# Patient Record
Sex: MALE | Race: WHITE | NOT HISPANIC OR LATINO | Employment: FULL TIME | URBAN - METROPOLITAN AREA
[De-identification: names, ages, dates, MRNs, and addresses within clinical notes are randomized per-mention and may not be internally consistent; named-entity substitution may affect disease eponyms.]

---

## 2017-01-10 ENCOUNTER — CLINICAL SUPPORT (OUTPATIENT)
Dept: REHABILITATION | Facility: HOSPITAL | Age: 37
End: 2017-01-10
Attending: ORTHOPAEDIC SURGERY
Payer: COMMERCIAL

## 2017-01-10 DIAGNOSIS — M54.2 CERVICALGIA: Primary | ICD-10-CM

## 2017-01-10 PROCEDURE — 97140 MANUAL THERAPY 1/> REGIONS: CPT | Performed by: PHYSICAL THERAPIST

## 2017-01-10 PROCEDURE — 97110 THERAPEUTIC EXERCISES: CPT | Performed by: PHYSICAL THERAPIST

## 2017-01-10 NOTE — PROGRESS NOTES
"..                                                      Physical Therapy Progress Note     Name: Felix DANIEL [facility]  Clinic Number: [id number]  Diagnosis:   Encounter Diagnosis   Name Primary?    Cervicalgia Yes     Physician: Eric Caro MD  Treatment Orders: PT Evaluation and Treatment  No past medical history on file.    Precautions: standard    Precautions: as per diagnosis/?instability C5-6 as per x-ray  Evaluation date: 11/22/2016  Visit # authorized: 8/30  Authorization period: 12-31-16  Plan of care expiration: 1-29-17  MD Follow up appt: none scheduled    Subjective     Pt reports: had an episode of some upper back pain with cold weather, but now warming up feeling better.  Pt states at rest no pain.  Very slight pain with chin tuck     Pain Scale: before treatment: 0/10 currently; after treatment: 0    Objective     Pt continues with some scapular instability L >R with increased winging and lateral rotation B    Therapeutic exercise: Felix received therapeutic exercises to develop BUE strengthening/flexibility and shoulder stabilization for 30 minutes    Verbally reviewed   Instructed pt in further scap strengthening with retraction with YTB x 10  Retraction with pull down x 10 with YTB  Single ER with blue TB x 20        UBE 3 min fwd/bwd no resistance(NP)    Chin tuck 1 x 10  Cervical rotation on ball w/ 3" hold 15 to each side(NP)  Shoulder shrug with ret x 10  Shoulder extension prone x 20 with 1#  Horizontal abd prone x 20   Pec stretch on half roll w/ nerve glides x 3 min   Protraction supine with blue theraband x 20    Lower trap Y's 1x10(NP)  Standing shoulder ER with BTB 2x10  Shoulder ER side lying w/ scapula retraction x 20   Side lying horizontal abduction w/ scapula retraction  (gator) lower trap 2x10   Horizontal abd B with YTB   Corner stretch x 3 planes x 5  Upper trap stretch 1x30"  Levator scap stretch 1x30"     Manual therapy: Felix received the following manual therapy techniques for " 10 minutes: STM neck and upper trap and suboccipitals with gentle suboccipital release.  Noted mod muscle tightness and tenderness R suboccipitals Contract relax Upper traps with SB B      Written Home Exercises Provided: indented ex as noted above  Pt demo good understanding of the education provided during the initial evaluation.   Felix demonstrated good return demonstration of activities.     Pt. education:  · Posture reeducation, body mechanics, HEP,activity modification/avoidance  · No spiritual or educational barriers to learning provided  · Pt has no cultural, educational or language barriers to learning provided.    Assessment   Pt with improved symptoms overall and will benefit from further scap strengthening  Pt will continue to benefit from skilled outpatient physical therapy to address the remaining functional deficits, provide pt/family education, and to maximize pt's level of independence in the home and community environment. .     Anticipated barriers to physical therapy: None    Medical necessity is demonstrated by the following IMPAIRMENTS/PROBLEM LIST:  Decreased range of motion  Decreased strength  Poor posture  Decreased scapular stabilization  Increased pain with prolonged use of UE's in sustained elevated position  Increased pain with carrying 2 year old son  Functional impairment rating of: 61     GOALS:   Short Term Goals: 2 weeks  PARTIALLY ACHIEVED STG'S  Increase range of motion 25%  Increase strength 1/2 muscle grade  Increase postural awareness to normal  Be able to perform HEP with minimal cueing required  Improve functional impairment to 65     Long Term Goals: 4 weeks PARTIALLY ACHIEVED LTG'S  Increase range of motion to 75%to 100% of full  Increase strength 1 muscle grade  Improve scapular stabilization to normal  Restore ability to lift and carry son without increased pain  Restore ability to use UE's in sustained position without increased pain  Pt to be independent with HEP to  improve ROM and independence with ADL's  Improve functional impairment to 71       · Pt's spiritual, cultural and educational needs considered and pt agreeable to plan of care and goals as stated below:        Plan   Continue with established plan of care towards PT goals.

## 2017-01-12 ENCOUNTER — CLINICAL SUPPORT (OUTPATIENT)
Dept: REHABILITATION | Facility: HOSPITAL | Age: 37
End: 2017-01-12
Attending: ORTHOPAEDIC SURGERY
Payer: COMMERCIAL

## 2017-01-12 DIAGNOSIS — M54.2 CERVICALGIA: Primary | ICD-10-CM

## 2017-01-12 PROCEDURE — 97110 THERAPEUTIC EXERCISES: CPT | Performed by: PHYSICAL THERAPIST

## 2017-01-12 PROCEDURE — 97140 MANUAL THERAPY 1/> REGIONS: CPT | Performed by: PHYSICAL THERAPIST

## 2017-01-12 NOTE — PROGRESS NOTES
"..                                                      Physical Therapy Progress Note     Name: Felix DANIEL [facility]  Clinic Number: [id number]  Diagnosis:   Encounter Diagnosis   Name Primary?    Cervicalgia Yes     Physician: Eric Caro MD  Treatment Orders: PT Evaluation and Treatment  No past medical history on file.    Precautions: standard    Precautions: as per diagnosis/?instability C5-6 as per x-ray  Evaluation date: 11/22/2016  Visit # authorized: 8/30  Authorization period: 12-31-16  Plan of care expiration: 1-29-17  MD Follow up appt: none scheduled    Subjective     Pt reports: was a little sore in shoulder blade, so rested yesterday from exercises.  This AM little neck pain upon awakening, but better now      Pain Scale: before treatment: 0/10 currently; after treatment: 0    Objective     Pt continues with some scapular instability L >R with increased winging and lateral rotation B    Therapeutic exercise: Felix received therapeutic exercises to develop BUE strengthening/flexibility and shoulder stabilization for 15 direct minutes  Assisted by tech    Chin tuck 2 x 10  Retraction with YTB x 10  Retraction with pull down x 10 with YTB  Single ER with blue TB x 20  Corner stretch x 3 planes x 5  Upper trap stretch 1x30"  Levator scap stretch 1x30"   Shoulder shrug with ret x 10    Shoulder extension prone x 20 with 1#  Horizontal abd prone x 20   Pec stretch on half roll w/ nerve glides x 3 min  Protraction supine with blue theraband x 20    Lower trap Y's 1x10 prone  Shoulder ER side lying w/ scapula retraction x 20 with 1#  Side lying horizontal abduction w/ scapula retraction  (gator) lower trap 2x10 with 1#    Manual therapy: Felix received the following manual therapy techniques for 10 minutes: STM neck and upper trap and suboccipitals with gentle suboccipital release.  Noted min-mod muscle tightness and tenderness R suboccipitals Contract relax Upper traps with SB B      Written Home Exercises " Provided: none today  Pt demo good understanding of the education provided during the initial evaluation.   Felix demonstrated good return demonstration of activities.     Pt. education:  · Posture reeducation, body mechanics, HEP,activity modification/avoidance  · No spiritual or educational barriers to learning provided  · Pt has no cultural, educational or language barriers to learning provided.    Assessment   Pt with muscle soreness after last treatment since now better isolating muscles with strengthening  Decreased tightness overall in neck musculature.      Pt will continue to benefit from skilled outpatient physical therapy to address the remaining functional deficits, provide pt/family education, and to maximize pt's level of independence in the home and community environment. .     Anticipated barriers to physical therapy: None    Medical necessity is demonstrated by the following IMPAIRMENTS/PROBLEM LIST:  Decreased range of motion  Decreased strength  Poor posture  Decreased scapular stabilization  Increased pain with prolonged use of UE's in sustained elevated position  Increased pain with carrying 2 year old son  Functional impairment rating of: 61     GOALS:   Short Term Goals: 2 weeks  PARTIALLY ACHIEVED STG'S  Increase range of motion 25%  Increase strength 1/2 muscle grade  Increase postural awareness to normal  Be able to perform HEP with minimal cueing required  Improve functional impairment to 65     Long Term Goals: 4 weeks PARTIALLY ACHIEVED LTG'S  Increase range of motion to 75%to 100% of full  Increase strength 1 muscle grade  Improve scapular stabilization to normal  Restore ability to lift and carry son without increased pain  Restore ability to use UE's in sustained position without increased pain  Pt to be independent with HEP to improve ROM and independence with ADL's  Improve functional impairment to 71       · Pt's spiritual, cultural and educational needs considered and pt agreeable  to plan of care and goals as stated below:        Plan   Continue with established plan of care towards PT goals.

## 2017-01-17 ENCOUNTER — CLINICAL SUPPORT (OUTPATIENT)
Dept: REHABILITATION | Facility: HOSPITAL | Age: 37
End: 2017-01-17
Attending: ORTHOPAEDIC SURGERY
Payer: COMMERCIAL

## 2017-01-17 DIAGNOSIS — M54.2 CERVICALGIA: Primary | ICD-10-CM

## 2017-01-17 PROCEDURE — 97140 MANUAL THERAPY 1/> REGIONS: CPT | Performed by: PHYSICAL THERAPIST

## 2017-01-17 PROCEDURE — 97110 THERAPEUTIC EXERCISES: CPT | Performed by: PHYSICAL THERAPIST

## 2017-01-19 ENCOUNTER — CLINICAL SUPPORT (OUTPATIENT)
Dept: REHABILITATION | Facility: HOSPITAL | Age: 37
End: 2017-01-19
Attending: ORTHOPAEDIC SURGERY
Payer: COMMERCIAL

## 2017-01-19 DIAGNOSIS — M54.2 CERVICALGIA: Primary | ICD-10-CM

## 2017-01-19 PROCEDURE — 97140 MANUAL THERAPY 1/> REGIONS: CPT | Performed by: PHYSICAL THERAPIST

## 2017-01-19 PROCEDURE — 97110 THERAPEUTIC EXERCISES: CPT | Performed by: PHYSICAL THERAPIST

## 2017-01-19 NOTE — PROGRESS NOTES
"..                                                      Physical Therapy Progress Note     Name: Felix Garciamonika  Clinic Number: [id number]  Diagnosis:   Encounter Diagnosis   Name Primary?   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 and able to hold UE in sustained position longer  Pain Scale: before treatment: 0 currently; after treatment: no c/o    Objective     Improving tone in rhomboids    FOTO 68 on 1-17-17 previous 63    Therapeutic exercise: Felix received therapeutic exercises to develop BUE strengthening/flexibility and shoulder stabilization for 30 direct minutes    Upper trap stretch 1x30"  Levator scap stretch 1x30"   Chin tuck 2 x 10  Retraction with YTB x 10  Retraction with pull down x 10 with YTB  Single ER with blue TB x 20  Corner stretch x 3 planes x 5   Wall push ups plus x 20    Shoulder shrug with ret x 10    Shoulder extension prone x 10 with 2# 10 with 1#  Horizontal abd prone x 5 with 1# 2/10 with  No weight  Lower trap Y's 1x10 prone    Pec stretch on half roll w/ nerve glides x 3 min  Protraction supine with blue theraband x 20 and with 5#      Shoulder ER side lying w/ scapula retraction x 20 with 1#  Side lying horizontal abduction w/ scapula retraction  (gator) lower trap 2x10 with 1#      Instructed pt in use of functional closed chain ex as with push up and role of isolating muscles as we have been doing    Manual therapy: Felix received the following manual therapy techniques for 10 minutes: STM neck and upper trap and suboccipitals with gentle suboccipital release.  Noted min " muscle tightness and tenderness R suboccipitals min-mod upper trap, contract relax SB neck      Written Home Exercises Provided: none today  Pt demo good understanding of the education provided during the initial evaluation.   Fleix demonstrated good return demonstration of activities.     Pt. education:  · Posture reeducation, body mechanics, HEP,activity modification/avoidance  · No spiritual or educational barriers to learning provided  · Pt has no cultural, educational or language barriers to learning provided.    Assessment   Pt with improving endurance with sustained use of UE, can carry son better and improving tone, though still note fatigue with ex with YTB dennis in rhomboids    Pt will continue to benefit from skilled outpatient physical therapy to address the remaining functional deficits, provide pt/family education, and to maximize pt's level of independence in the home and community environment. .     Anticipated barriers to physical therapy: None    Medical necessity is demonstrated by the following IMPAIRMENTS/PROBLEM LIST:  Decreased range of motion  Decreased strength  Poor posture  Decreased scapular stabilization  Increased pain with prolonged use of UE's in sustained elevated position  Increased pain with carrying 2 year old son  Functional impairment rating of: 61     GOALS:   Short Term Goals: 2 weeks  PARTIALLY ACHIEVED STG'S  Increase range of motion 25%  Increase strength 1/2 muscle grade  Increase postural awareness to normal  Be able to perform HEP with minimal cueing required  Improve functional impairment to 65     Long Term Goals: 4 weeks PARTIALLY ACHIEVED LTG'S  Increase range of motion to 75%to 100% of full  Increase strength 1 muscle grade  Improve scapular stabilization to normal  Restore ability to lift and carry son without increased pain  Restore ability to use UE's in sustained position without increased pain  Pt to be independent with HEP to improve ROM and independence with  ADL's  Improve functional impairment to 71       · Pt's spiritual, cultural and educational needs considered and pt agreeable to plan of care and goals as stated below:        Plan   Continue with established plan of care towards PT goals.  See pt 1 more week and then should be ready for discharge

## 2017-01-24 ENCOUNTER — CLINICAL SUPPORT (OUTPATIENT)
Dept: REHABILITATION | Facility: HOSPITAL | Age: 37
End: 2017-01-24
Attending: ORTHOPAEDIC SURGERY
Payer: COMMERCIAL

## 2017-01-24 DIAGNOSIS — M54.2 CERVICALGIA: Primary | ICD-10-CM

## 2017-01-24 PROCEDURE — 97140 MANUAL THERAPY 1/> REGIONS: CPT | Performed by: PHYSICAL THERAPIST

## 2017-01-24 PROCEDURE — 97110 THERAPEUTIC EXERCISES: CPT | Performed by: PHYSICAL THERAPIST

## 2017-01-24 RX ORDER — MELOXICAM 15 MG/1
TABLET ORAL
Qty: 30 TABLET | Refills: 1 | Status: SHIPPED | OUTPATIENT
Start: 2017-01-24 | End: 2023-01-14 | Stop reason: ALTCHOICE

## 2017-01-24 NOTE — PROGRESS NOTES
"..                                                      Physical Therapy Daily Note     Name: Felix Garciamonika  Clinic Number: [id number]  Diagnosis:   Encounter Diagnosis   Name Primary?    Cervicalgia Yes     Physician: Eric Caro MD  Treatment Orders: PT Evaluation and Treatment    Precautions: standard    Precautions: as per diagnosis/?instability C5-6 as per x-ray  Evaluation date: 11/22/2016  Visit # authorized: 12/30  Authorization period: 12-31-16  Plan of care expiration: 1-29-17  MD Follow up appt: none scheduled    Subjective     Pt reports: feeling good overall one day had a little pain, but resolved after he overdid   Pain Scale: before treatment: 0 currently; after treatment: no c/o    Objective     Improving tone in rhomboids    FOTO 68 on 1-17-17 previous 63    Therapeutic exercise: Felix received therapeutic exercises to develop BUE strengthening/flexibility and shoulder stabilization for 30 direct minutes  Assisted by PT tech  Upper trap stretch 1x30"  Levator scap stretch 1x30"   Chin tuck 2 x 10  Retraction with YTB x 10  Retraction with pull down x 10 with YTB  Single ER with blue TB x 20  Corner stretch x 3 planes x 5  Wall push ups plus x 20    Shoulder shrug with ret x 10    Shoulder extension prone x 10 with 2# 10 with 1#  Horizontal abd prone x 5 with 1# 2/10 with  No weight  Lower trap Y's 1x10 prone    Pec stretch on half roll w/ nerve glides x 3 min  Protraction supine with blue theraband x 20 and with 5#      Shoulder ER side lying w/ scapula retraction x 20 with 1#  Side lying horizontal abduction w/ scapula retraction  (gator) lower trap 2x10 with 1#      Manual therapy: Felix received the following manual therapy techniques for 10 minutes: STM neck and upper trap and suboccipitals with gentle suboccipital release.  Noted min muscle tightness and tenderness R suboccipitals min-mod upper trap,       Written Home Exercises Provided: none today  Pt demo good understanding of the " education provided during the initial evaluation.   Felix demonstrated good return demonstration of activities.     Pt. education:  · Posture reeducation, body mechanics, HEP,activity modification/avoidance  · No spiritual or educational barriers to learning provided  · Pt has no cultural, educational or language barriers to learning provided.    Assessment   Pt moving well through HEP and should be ready for discharge after next visit    Pt will continue to benefit from skilled outpatient physical therapy to address the remaining functional deficits, provide pt/family education, and to maximize pt's level of independence in the home and community environment. .     Anticipated barriers to physical therapy: None    Medical necessity is demonstrated by the following IMPAIRMENTS/PROBLEM LIST:  Decreased range of motion  Decreased strength  Poor posture  Decreased scapular stabilization  Increased pain with prolonged use of UE's in sustained elevated position  Increased pain with carrying 2 year old son  Functional impairment rating of: 61     GOALS:   Short Term Goals: 2 weeks  PARTIALLY ACHIEVED STG'S  Increase range of motion 25%  Increase strength 1/2 muscle grade  Increase postural awareness to normal  Be able to perform HEP with minimal cueing required  Improve functional impairment to 65     Long Term Goals: 4 weeks PARTIALLY ACHIEVED LTG'S  Increase range of motion to 75%to 100% of full  Increase strength 1 muscle grade  Improve scapular stabilization to normal  Restore ability to lift and carry son without increased pain  Restore ability to use UE's in sustained position without increased pain  Pt to be independent with HEP to improve ROM and independence with ADL's  Improve functional impairment to 71       · Pt's spiritual, cultural and educational needs considered and pt agreeable to plan of care and goals as stated below:        Plan   Continue with established plan of care towards PT goals.  See pt 1 more  visit and then should be ready for discharge

## 2017-01-26 ENCOUNTER — CLINICAL SUPPORT (OUTPATIENT)
Dept: REHABILITATION | Facility: HOSPITAL | Age: 37
End: 2017-01-26
Attending: ORTHOPAEDIC SURGERY
Payer: COMMERCIAL

## 2017-01-26 DIAGNOSIS — M54.2 CERVICALGIA: Primary | ICD-10-CM

## 2017-01-26 PROCEDURE — 97140 MANUAL THERAPY 1/> REGIONS: CPT | Performed by: PHYSICAL THERAPIST

## 2017-01-26 PROCEDURE — 97110 THERAPEUTIC EXERCISES: CPT | Performed by: PHYSICAL THERAPIST

## 2017-01-26 NOTE — PROGRESS NOTES
..                                                      Physical Therapy Discharge Note     Name: Felix Stern  Clinic Number: [id number]  Diagnosis:   Encounter Diagnosis   Name Primary?    Cervicalgia Yes     Physician: Eric Caro MD  Treatment Orders: PT Evaluation and Treatment    Precautions: standard    Precautions: as per diagnosis/?instability C5-6 as per x-ray  Evaluation date: 11/22/2016  Visit # authorized: 13/30  Authorization period: 12-31-16  Plan of care expiration: 1-29-17  MD Follow up appt: none scheduled    Subjective     Pt reports: feeling good with no pain since last visit at worst 1-2 3 maybe worst  Pain Scale: before treatment: 0 currently; after treatment: no c/o    Objective     Cervical ROM: (measured in degrees sitting) 1-26-17  - flexion -  55                    - extension -  60                        - right side bending -  50        - left side bending -  50      - right rotation - 80  - left rotation - 80      Cervical ROM: (measured in degrees sitting) INITIAL EVAL  - flexion - 55 pain in neck at end range   - extension - 60   - right side bending - 45 little pulling on R   - left side bending - 45   - right rotation - 80  - left rotation - 80 pain on L     Shoulder ROM: (measured in degrees standing)  Same as IE   Shoulder  RUE LUE   Active Flexion 180 180   Active Abduction 180 180      Scapular instability noted with elevation and abduction in standing with increased winging, lateral rotation and protraction of the L scapula.      Shoulder ROM: (measured in degrees supine) PROM WNL    Muscle Strength 1-26-17  MMT R L   Elbow flexion 5/5 5/5   Elbow extension 5/5 5/5   Shoulder flexion 5/5 5/5   Shoulder abduction 5/5 5/5   Shoulder external rotation 5/5 5/5   Shoulder internal rotation 5/5 5/5        Upper trap 5/5 5/5   Middle trap 4+/5 4+/5   Lower trap 4/5 4/5   Rhomboids 4/5 4/5             Muscle Strength INITIAL EVAL  MMT R L   Elbow flexion 5/5 5/5   Elbow  "extension 5/5 5/5   Shoulder flexion 5/5 5/5   Shoulder abduction 5/5 5/5   Shoulder external rotation 5-/5 4+/5   Shoulder internal rotation 5/5 5/5           Upper trap 5/5 5/5   Middle trap 3/5 3/5   Lower trap 3-/5 3-/5   Rhomboids 3-/5 3-/5         Endurance is good at IE poor       Outcome measures:   FOTO neck disability index score: 74 at IE 61  PT reviewed FOTO scores for Felix Stern    FOTO scores were entered into EPIC - see media section.         Therapeutic exercise: Felix received therapeutic exercises to develop BUE strengthening/flexibility and shoulder stabilization for 30 direct minutes  Assisted by PT tech  Upper trap stretch 1x30"  Levator scap stretch 1x30"   Chin tuck 2 x 10  Retraction with YTB x 10  Retraction with pull down x 10 with YTB  Single ER with blue TB x 20  Corner stretch x 3 planes x 5  Wall push ups plus x 20    Shoulder shrug with ret x 10    Shoulder extension prone x 10 with 2# 10 with 1#  Horizontal abd prone x 10 with 2# 2/10 with  No weight  Lower trap Y's 2x10 prone    Pec stretch on half roll w/ nerve glides x 3 min  Protraction supine with blue theraband x 20 and with 5#      Shoulder ER side lying w/ scapula retraction x 20 with 1#  Side lying horizontal abduction w/ scapula retraction  (gator) lower trap 2x10 with 1#      Manual therapy: Felix received the following manual therapy techniques for 10 minutes: STM neck and upper trap and suboccipitals with gentle suboccipital release.  Noted min muscle tightness and tenderness R suboccipitals min-mod upper trap,       Written Home Exercises Provided: none today  Pt demo good understanding of the education provided during the initial evaluation.   Felix demonstrated good return demonstration of activities.     Pt. education:  · Posture reeducation, body mechanics, HEP,activity modification/avoidance  · No spiritual or educational barriers to learning provided  · Pt has no cultural, educational or language barriers to " learning provided.    Assessment   Patient demonstrates improvement in range of motion, strength, stabilization and function.  Patient appears independent in the prescribed HEP and ready for discharge after fully achieving the established goals.      Medical necessity is demonstrated by the following IMPAIRMENTS/PROBLEM LIST:  Decreased range of motion  Decreased strength  Poor posture  Decreased scapular stabilization  Increased pain with prolonged use of UE's in sustained elevated position  Increased pain with carrying 2 year old son  Functional impairment rating of: 61     GOALS:   Short Term Goals: 2 weeks   ACHIEVED STG'S  Increase range of motion 25%  Increase strength 1/2 muscle grade  Increase postural awareness to normal  Be able to perform HEP with minimal cueing required  Improve functional impairment to 65     Long Term Goals: 4 weeks  ACHIEVED LTG'S  Increase range of motion to 75%to 100% of full  Increase strength 1 muscle grade  Improve scapular stabilization to normal  Restore ability to lift and carry son without increased pain  Restore ability to use UE's in sustained position without increased pain  Pt to be independent with HEP to improve ROM and independence with ADL's  Improve functional impairment to 71        Plan   Patient is discharged from physical therapy after fully achieving the established goals.  Thank you for allowing us to assist in the care of your patient.

## 2017-02-10 ENCOUNTER — HOSPITAL ENCOUNTER (OUTPATIENT)
Dept: RADIOLOGY | Facility: HOSPITAL | Age: 37
Discharge: HOME | End: 2017-02-10
Attending: ORTHOPAEDIC SURGERY
Payer: COMMERCIAL

## 2017-02-10 ENCOUNTER — OFFICE VISIT (OUTPATIENT)
Dept: ORTHOPEDICS | Facility: CLINIC | Age: 37
End: 2017-02-10
Payer: COMMERCIAL

## 2017-02-10 VITALS
BODY MASS INDEX: 25.44 KG/M2 | WEIGHT: 158.31 LBS | SYSTOLIC BLOOD PRESSURE: 128 MMHG | HEIGHT: 66 IN | HEART RATE: 69 BPM | DIASTOLIC BLOOD PRESSURE: 89 MMHG

## 2017-02-10 DIAGNOSIS — R52 PAIN: ICD-10-CM

## 2017-02-10 DIAGNOSIS — M20.21 HALLUX RIGIDUS OF RIGHT FOOT: ICD-10-CM

## 2017-02-10 DIAGNOSIS — R52 PAIN: Primary | ICD-10-CM

## 2017-02-10 PROCEDURE — 99214 OFFICE O/P EST MOD 30 MIN: CPT | Mod: S$GLB,,, | Performed by: ORTHOPAEDIC SURGERY

## 2017-02-10 PROCEDURE — 73630 X-RAY EXAM OF FOOT: CPT | Mod: 26,RT,, | Performed by: RADIOLOGY

## 2017-02-10 PROCEDURE — 73630 X-RAY EXAM OF FOOT: CPT | Mod: TC,RT

## 2017-02-10 PROCEDURE — 99999 PR PBB SHADOW E&M-EST. PATIENT-LVL III: CPT | Mod: PBBFAC,,, | Performed by: ORTHOPAEDIC SURGERY

## 2017-02-10 RX ORDER — ALBUTEROL SULFATE 90 UG/1
AEROSOL, METERED RESPIRATORY (INHALATION)
COMMUNITY
Start: 2017-02-03

## 2017-02-10 RX ORDER — ACYCLOVIR 400 MG/1
400 TABLET ORAL DAILY
COMMUNITY
Start: 2016-12-24 | End: 2024-01-04 | Stop reason: SDUPTHER

## 2017-02-10 NOTE — PROGRESS NOTES
This is a new patient.    CHIEF COMPLAINT:  Bunion, right foot.    SUMMARY:  This is a 36-year-old gentleman who works as an  who over   the last year has noticed some prominence and discomfort on dorsum of his right   foot and big toe.  He comes in today for evaluation.  He does not report any   injury.  As mentioned, he is on his feet a lot as an .  He states   the pain that he gets essentially occurs when he has been on his feet for long   periods.  He does not really report any morning stiffness.  He states he has   noted bunion may be getting larger over time.    PAST MEDICAL HISTORY:  Otherwise, noncontributory.    SOCIAL HISTORY:  Tobacco use, not reported.  Alcohol use, occasionally.    MEDICATIONS:  Include Ventolin inhaler.    ALLERGIES:  None known.    REVIEW OF SYSTEMS:  Negative for any chest pain, shortness of breath, fevers or   weight loss.    PHYSICAL EXAMINATION:  GENERAL:  This is a well-developed, well-nourished, 5 feet 6 inches, 158-pound   male with a pulse of 70 who walks in with a normal gait.  EXTREMITIES:  On standing inspection, he has plantigrade alignment of his feet.    He has some mild noticeable prominence in the dorsum of his right big toe at   the MTP joint.  On sitting exam, he has a palpable bony prominence, which is   mildly tender.  He has good functional motion of his big toe, but he is a bit   limited in dorsiflexion on the right compared to the left and this does cause   some discomfort.  He does not have any pain within the mid range of motion.  He   has full motion of his ankle and subtalar joint.  He has normal strength and   sensation and good distal pulses.    IMAGING:  I ordered and reviewed a right foot x-ray today.  He has some mild   degenerative changes of his right big toe MTP joint on AP view.  On the lateral   view, he has some dorsal osteophyte formation of the first metatarsal head and   some slight osteophyte formation in the dorsum  of the proximal phalanx.    IMPRESSION:  Right hallux rigidus.    RECOMMENDATIONS:  Treatment options were discussed with Mr. Stern.  At this   point, his symptoms are relatively manageable.  We did have some discussion   about possibly doing a cheilectomy to remove the osteophyte formation.  If he   decides he would like to do that, he will call and let us know.  I am going to   have him return to see me as needed.      SCOTT/AMBROSIO  dd: 02/10/2017 08:58:08 (CST)  td: 02/10/2017 15:05:37 (CST)  Doc ID   #[id number]  Job ID #[id number]    CC:

## 2017-09-12 ENCOUNTER — OFFICE VISIT (OUTPATIENT)
Dept: GASTROENTEROLOGY | Facility: CLINIC | Age: 37
End: 2017-09-12
Payer: COMMERCIAL

## 2017-09-12 VITALS
HEIGHT: 66 IN | SYSTOLIC BLOOD PRESSURE: 131 MMHG | DIASTOLIC BLOOD PRESSURE: 98 MMHG | WEIGHT: 160.94 LBS | HEART RATE: 86 BPM | BODY MASS INDEX: 25.86 KG/M2

## 2017-09-12 DIAGNOSIS — K21.9 GASTROESOPHAGEAL REFLUX DISEASE WITHOUT ESOPHAGITIS: Primary | ICD-10-CM

## 2017-09-12 PROCEDURE — 99204 OFFICE O/P NEW MOD 45 MIN: CPT | Mod: S$GLB,,, | Performed by: PHYSICIAN ASSISTANT

## 2017-09-12 PROCEDURE — 3008F BODY MASS INDEX DOCD: CPT | Mod: S$GLB,,, | Performed by: PHYSICIAN ASSISTANT

## 2017-09-12 PROCEDURE — 99999 PR PBB SHADOW E&M-EST. PATIENT-LVL IV: CPT | Mod: PBBFAC,,, | Performed by: PHYSICIAN ASSISTANT

## 2017-09-12 RX ORDER — ESOMEPRAZOLE MAGNESIUM 10 MG/1
10 GRANULE, FOR SUSPENSION, EXTENDED RELEASE ORAL
COMMUNITY
End: 2021-02-01

## 2017-09-12 RX ORDER — FAMOTIDINE 10 MG/1
10 TABLET ORAL 2 TIMES DAILY
COMMUNITY
End: 2024-01-04

## 2017-09-12 RX ORDER — MULTIVITAMIN
1 TABLET ORAL DAILY
COMMUNITY
End: 2024-01-04

## 2017-09-12 NOTE — PROGRESS NOTES
[facility] Consultation Note    Reason for Consult:  Gastroesophageal reflux    PCP: Primary Doctor No     Referring MD:   Aaareferral Self  No address on file    HPI:  This is a 36 y.o. male here for evaluation of gastroesophageal reflux.  Duration - 4 yrs   Took Nexium for a few years with great relief, then stopped after reading it could cause dementia; he says he already suffers from memory loss. Then started pepcid and zantac 150mg in AM then in PM if needed,  without relief.     Restarted nexium OTC 3 days ago after having a severe episode of GERD and regurgitation up to his nose, after lying down 3 nights ago after eating mexican food.     Typical GERD Symptoms  Pyrosis - yes  Regurgitation- yes  Nocturnal symptoms - early morning  Nausea- no  Vomiting-no   Dysphagia/Odynophagia - no  Epigastric abdominal pain- no     Diet / Lifestyle:  Last Meal at  7pm   lying down / reclining at  10-11pm  Caffeine intake - coffee 16-20oz, coke 1-2 per day  He admits he eat many foods that could cause GERD including tacos every Tuesday, red sauce, mexican food, fast food.    Prior EGD date/findings: no prior    ROS:  Constitutional: No fevers, chills, No weight loss  ENT: No allergies  CV: No chest pain  Pulm: No cough, No shortness of breath  Ophtho: No vision changes  GI: see HPI  Derm: No rash  Heme: No lymphadenopathy, No bruising  MSK: No arthritis  : No dysuria, No hematuria  Endo: No hot or cold intolerance  Neuro: No syncope, No seizure  Psych: No anxiety, No depression    Medical History:  has a past medical history of GERD (gastroesophageal reflux disease).    Surgical History:  has a past surgical history that includes Hand surgery.    Family History: family history is not on file..     Social History:  reports that he has never smoked. He has never used smokeless tobacco. He reports that he drinks alcohol. He reports that he does not use drugs.    Review of patient's allergies  "indicates:  No Known Allergies    Current Outpatient Prescriptions   Medication Sig    acyclovir (ZOVIRAX) 400 MG tablet     esomeprazole magnesium (NEXIUM) 10 mg GrPS Take 10 mg by mouth before breakfast.    famotidine (PEPCID) 10 MG tablet Take 10 mg by mouth 2 (two) times daily.    multivitamin (THERAGRAN) per tablet Take 1 tablet by mouth once daily.    ranitidine (ZANTAC) 15 mg/mL syrup Take by mouth every 12 (twelve) hours.    VENTOLIN HFA 90 mcg/actuation inhaler     meloxicam (MOBIC) 15 MG tablet TAKE 1 TABLET BY MOUTH DAILY    methocarbamol (ROBAXIN) 750 MG Tab TAKE 1 TABLET BY MOUTH NIGHTLY AS NEEDED FOR SPASMS     No current facility-administered medications for this visit.          Objective Findings:    Vital Signs:  BP (!) 131/98   Pulse 86   Ht 5' 6" (1.676 m)   Wt 73 kg (160 lb 15 oz)   BMI 25.98 kg/m²   Body mass index is 25.98 kg/m².    Physical Exam:  General Appearance: Well appearing in no acute distress  Head:   Normocephalic, without obvious abnormality  Eyes:    No scleral icterus  ENT: Neck supple, Lips, mucosa, and tongue normal  Lungs: CTA bilaterally in anterior and posterior fields, no wheezes, no crackles.  Heart:  Regular rate and rhythm, S1, S2 normal, no murmurs heard  Abdomen: Soft, non tender, non distended with positive bowel sounds in all four quadrants.  Extremities: no edema  Skin: No rash  Neurologic: AAO x 3      Labs:  No results found for: WBC, HGB, HCT, PLT, CHOL, TRIG, HDL, LDLDIRECT, ALT, AST, NA, K, CL, CREATININE, BUN, CO2, TSH, PSA, INR, GLUF, HGBA1C, MICROALBUR      Endoscopy:    none  Assessment:  1. Gastroesophageal reflux disease without esophagitis       37yo M with GERD x 4 years. Good relief with Nexium OTC, poor relief with HA2 blockers. High intake of spicy, fried foods. I suspect his symptoms would improve if he would avoid acidic foods    Recommendations:  1. Schedule EGD to rule out Richardson's esophagus, and a hiatal hernia    2. Trial of zantac " 300mg BID. Consider 3-6 course of PPI, then weening after EGD     3. GERD diet    Return in about 3 months (around 12/12/2017).      Order summary:  Orders Placed This Encounter    Case request GI: ESOPHAGOGASTRODUODENOSCOPY (EGD)         Thank you so much for allowing me to participate in the care of Felix Earl PA-C

## 2017-09-13 PROBLEM — K21.9 GASTROESOPHAGEAL REFLUX DISEASE WITHOUT ESOPHAGITIS: Status: ACTIVE | Noted: 2017-09-13

## 2018-07-10 ENCOUNTER — TELEPHONE (OUTPATIENT)
Dept: ENDOSCOPY | Facility: HOSPITAL | Age: 38
End: 2018-07-10

## 2020-09-02 ENCOUNTER — TELEPHONE (OUTPATIENT)
Dept: FAMILY MEDICINE | Facility: CLINIC | Age: 40
End: 2020-09-02

## 2020-09-02 ENCOUNTER — NURSE TRIAGE (OUTPATIENT)
Dept: ADMINISTRATIVE | Facility: CLINIC | Age: 40
End: 2020-09-02

## 2020-09-02 ENCOUNTER — OFFICE VISIT (OUTPATIENT)
Dept: FAMILY MEDICINE | Facility: CLINIC | Age: 40
End: 2020-09-02
Payer: COMMERCIAL

## 2020-09-02 DIAGNOSIS — R68.83 CHILLS: ICD-10-CM

## 2020-09-02 DIAGNOSIS — J45.40 MODERATE PERSISTENT ASTHMA WITHOUT COMPLICATION: ICD-10-CM

## 2020-09-02 DIAGNOSIS — R05.9 COUGH: Primary | ICD-10-CM

## 2020-09-02 PROCEDURE — 99202 OFFICE O/P NEW SF 15 MIN: CPT | Mod: 95,,, | Performed by: STUDENT IN AN ORGANIZED HEALTH CARE EDUCATION/TRAINING PROGRAM

## 2020-09-02 PROCEDURE — 99202 PR OFFICE/OUTPT VISIT, NEW, LEVL II, 15-29 MIN: ICD-10-PCS | Mod: 95,,, | Performed by: STUDENT IN AN ORGANIZED HEALTH CARE EDUCATION/TRAINING PROGRAM

## 2020-09-02 NOTE — TELEPHONE ENCOUNTER
----- Message from Jenna Alfonso MD sent at 9/2/2020  3:50 PM CDT -----  Regarding: Need a little bit of help  Hello, whoever picks up this task.  Thanks for helping.    1. I forgot to ask the patient his dose on his Symbicort for me to refill.  It is not under his medication reconciliation button.  Can you call and ask him what dose and refill it for me.  I will be happy to sign off.  He can have 6 months worth (either in 1 fill and 5 refills or 3 month supply with 1 refill).    2.  Can we make sure he gets set up for his covid-19 test and his surveillance program.  Also, is there a way to put him on a call list or something to make sure we check on him every couple of days for the first week to ensure he is not deteriorating.   3.  He has the portal so I am assuming he can see his after visit summary.    I think that is all that I have/need.  Thanks for any help you can lend and I hope you have a blessed day.   Dr. BRADEN

## 2020-09-02 NOTE — TELEPHONE ENCOUNTER
Pt already submitted consent for Covid Surveillance call so no welcome call needed.     Called patient to review COVID-19 Surveillance Program enrollment process.    Smartphone: yes    [facility]: yes    Program consent: yes    Pulse oximeter status: yes    Verified emergency contacts: yes    Program Overview: Reviewed , no response process, and importance of correct emergency contacts in event that well-being check is warranted. Encouraged patient to call [phone] 24/7 to speak with an OnCall nurse, if needed.    Patient had no further questions.      Reason for Disposition   Caller has cancelled the call before the first contact    Protocols used: NO CONTACT OR DUPLICATE CONTACT CALL-A-AH

## 2020-09-02 NOTE — PATIENT INSTRUCTIONS
Please report any worsening fever, cough, and especially shortness of breath to your provider.  Please do not hesitate to go to an emergency department if you experience any worsening shortness of breath or significant difficulty breathing.    Use Tylenol or NSAIDS (aleve, Ibuprofen) for fever.    Stay Hydrated.  Monitor breathing and cough.  Stay home.  Do not go out unless for an emergency reason.  Have friends, family bring food if needed or utilize food delivery from restaurants, delivery service, [employer] if possible.

## 2020-09-02 NOTE — PROGRESS NOTES
Answers for HPI/ROS submitted by the patient on 9/2/2020   Cough  Chronicity: new  Onset: in the past 7 days  Progression since onset: gradually improving  Frequency: every few minutes  Cough characteristics: non-productive  chest pain: No  chills: No  ear congestion: No  ear pain: No  fever: No  headaches: No  heartburn: Yes  hemoptysis: No  myalgias: No  nasal congestion: No  postnasal drip: No  rash: No  rhinorrhea: No  shortness of breath: No  sore throat: No  sweats: No  weight loss: No  wheezing: No  Aggravated by: cold air  asthma: Yes  bronchiectasis: No  bronchitis: No  COPD: No  emphysema: No  environmental allergies: No  pneumonia: No  Treatments tried: OTC cough suppressant, a beta-agonist inhaler, steroid inhaler  Improvement on treatment: mild

## 2020-09-02 NOTE — PROGRESS NOTES
The patient location is: [place]   The chief complaint leading to consultation is: Concern for Covid, Cough, Chills, SOB    Visit type: audiovisual    Face to Face time with patient: 20 Mins  30 minutes of total time spent on the encounter, which includes face to face time and non-face to face time preparing to see the patient (eg, review of tests), Obtaining and/or reviewing separately obtained history, Documenting clinical information in the electronic or other health record, Independently interpreting results (not separately reported) and communicating results to the patient/family/caregiver, or Care coordination (not separately reported).         Each patient to whom he or she provides medical services by telemedicine is:  (1) informed of the relationship between the physician and patient and the respective role of any other health care provider with respect to management of the patient; and (2) notified that he or she may decline to receive medical services by telemedicine and may withdraw from such care at any time.    HPI:   Suspected Covid-   Patient has been exposed by his wife who was diagnosed approximately 10 days ago.  He has been quarantining from her with his 6 year old son in their home but in different rooms.  His son is home schooled and he has not been going to work.  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  He has a history of Moderate persistent asthma and uses Symbicort and Albuterol.  He had been feeling fine but over the last few days has noticed a dry nagging cough that will not subside.  He did note possible chills but no definite fever.  He has also noted a difference in his breathing.  He thinks he may have some mild shortness of breath but nothing significant.  He does not feel like this is an asthma exacerbation. He has not gotten significant relief from his albuterol rescue inhaler. He does not feel like it limits his  activities and he honestly was able to cut the grass yesterday without any issue.  He Otherwise has no symptoms.  No loss of taste or smell.  NO GI upset.  Overall he feels fair but would like to know if these symptoms are covid-19, asthma related, or just basic respiratory virus related.  He understands that a negative test today does not absolutly rule out an early covid infection.  This will also help him to make a decision on length of quarantine and when to return to work.     Review of Systems   Constitutional: Positive for chills. Negative for fever.   HENT: Negative for congestion and sore throat.         No loss of smell or taste   Respiratory: Positive for cough and shortness of breath. Negative for hemoptysis, sputum production and wheezing.    Cardiovascular: Negative for chest pain and palpitations.   Gastrointestinal: Positive for heartburn. Negative for constipation, diarrhea, nausea and vomiting.   Musculoskeletal: Negative for myalgias.   Skin: Negative for rash.   Neurological: Negative for headaches.   Endo/Heme/Allergies: Negative for environmental allergies.     Physical Exam   Constitutional: He is well-developed, well-nourished, and in no distress. No distress.   Pulmonary/Chest: Effort normal. No respiratory distress.   Neurological: He is alert.   Psychiatric: Mood, memory, affect and judgment normal.     Problem List Items Addressed This Visit        Pulmonary    Moderate persistent asthma without complication     Under good control with symbicort and albuterol.  He feels his sob with this new illness does not feel like his typical asthma attack.  He has not gotten any particular improvement with his albuterol inhaler.  No wheeze.  He is able to conduct his ADLS including cutting the grass without significant respiratory distress or change in symptoms.            Other Visit Diagnoses     Cough    -  Primary    Wife with recent Covid-19 diagnosis 10 days prior.  Cough/chills/mild sob over  the last few days.  Covid testing.  Covid followup with O2 sats given his asthma.    Relevant Medications    pulse oximeter (PULSE OXIMETER) device    Other Relevant Orders    COVID-19 Surveillance Program (Completed)    COVID-19 Routine Screening    Chills        Covid-19 testing, followup with O2 sats given his asthma.  Symptomtic treatment for his asthma, and for any fevers.  Report worsening symptoms.     Relevant Medications    pulse oximeter (PULSE OXIMETER) device    Other Relevant Orders    COVID-19 Surveillance Program (Completed)    COVID-19 Routine Screening

## 2020-09-02 NOTE — ASSESSMENT & PLAN NOTE
Under good control with symbicort and albuterol.  He feels his sob with this new illness does not feel like his typical asthma attack.  He has not gotten any particular improvement with his albuterol inhaler.  No wheeze.  He is able to conduct his ADLS including cutting the grass without significant respiratory distress or change in symptoms.

## 2020-09-03 ENCOUNTER — LAB VISIT (OUTPATIENT)
Dept: INTERNAL MEDICINE | Facility: CLINIC | Age: 40
End: 2020-09-03
Payer: COMMERCIAL

## 2020-09-03 ENCOUNTER — PATIENT MESSAGE (OUTPATIENT)
Dept: FAMILY MEDICINE | Facility: CLINIC | Age: 40
End: 2020-09-03

## 2020-09-03 ENCOUNTER — NURSE TRIAGE (OUTPATIENT)
Dept: ADMINISTRATIVE | Facility: CLINIC | Age: 40
End: 2020-09-03

## 2020-09-03 DIAGNOSIS — R05.9 COUGH: ICD-10-CM

## 2020-09-03 DIAGNOSIS — R05.9 COUGH: Primary | ICD-10-CM

## 2020-09-03 DIAGNOSIS — R68.83 CHILLS: ICD-10-CM

## 2020-09-03 PROCEDURE — U0003 INFECTIOUS AGENT DETECTION BY NUCLEIC ACID (DNA OR RNA); SEVERE ACUTE RESPIRATORY SYNDROME CORONAVIRUS 2 (SARS-COV-2) (CORONAVIRUS DISEASE [COVID-19]), AMPLIFIED PROBE TECHNIQUE, MAKING USE OF HIGH THROUGHPUT TECHNOLOGIES AS DESCRIBED BY CMS-2020-01-R: HCPCS

## 2020-09-03 NOTE — TELEPHONE ENCOUNTER
I can see the order under other orders as a nasopharyngeal swab for covid screen.  I believe it is signed because it let me sign the visit.  I am not sure how to ensure it is signed.  Do I need to put it in again?  Also, have we figured out his Symbicort dose so we can refill it?

## 2020-09-03 NOTE — TELEPHONE ENCOUNTER
Covid Home Surveillance   Vitals O2  97  Temp 98.7    Fever Symptomns: Yes  Cough: Yes, not worsening  SOB at rest: 1  SOB with activity: 1    Pt escalated through the Covid Home Surveillance program d/t abnormal v/s of  and fever symptoms.  Pt reported that he was feeling fine and just having a very busy and active day.  Pt has been having the chills and sweats at times and having a nonproductive cough.  He also has an inhaler to use when he gets a little winded.  Home care advised.  RN asked pt to sit down and recheck his HR and the new reading was 107.  Pt reports that he has never had a fever and he was just a little anxious today about trying to get his Covid test completed so that he does not miss anymore work.  Pt has been in correspondence with his doctor and everything is set and ready now for him to go to lab and have the test performed.  Pt instructed to call [facility] if he has any questions or concerns.  Pt denies fever, worsening cough or any SOB at this time.  Per protocol, no additional action needed at this time.  Pt verbalized understanding to all.    Reason for Disposition   [1] COVID-19 diagnosed by positive lab test AND [2] mild symptoms (e.g., cough, fever, others) AND [3] no complications or SOB   COVID-19 Testing, questions about   COVID-19 Home Isolation, questions about    Additional Information   Negative: Severe difficulty breathing (e.g., struggling for each breath, speaks in single words)   Negative: Difficult to awaken or acting confused (e.g., disoriented, slurred speech)   Negative: Bluish (or gray) lips or face now   Negative: Shock suspected (e.g., cold/pale/clammy skin, too weak to stand, low BP, rapid pulse)   Negative: Sounds like a life-threatening emergency to the triager   Negative: [1] COVID-19 exposure AND [2] no symptoms   Negative: COVID-19 and Breastfeeding, questions about   Negative: [1] Adult with possible COVID-19 symptoms AND [2] triager concerned  about severity of symptoms or other causes   Negative: SEVERE or constant chest pain or pressure (Exception: mild central chest pain, present only when coughing)   Negative: MODERATE difficulty breathing (e.g., speaks in phrases, SOB even at rest, pulse 100-120)   Negative: MILD difficulty breathing (e.g., minimal/no SOB at rest, SOB with walking, pulse <100)   Negative: Chest pain   Negative: Patient sounds very sick or weak to the triager   Negative: Fever > 103 F (39.4 C)   Negative: [1] Fever > 101 F (38.3 C) AND [2] age > 60   Negative: [1] Fever > 100.0 F (37.8 C) AND [2] bedridden (e.g., nursing home patient, CVA, chronic illness, recovering from surgery)   Negative: HIGH RISK patient (e.g., age > 64 years, diabetes, heart or lung disease, weak immune system) (Exception: has already been evaluated by healthcare provider and has no new or worsening symptoms)   Negative: [1] COVID-19 infection suspected by caller or triager AND [2] mild symptoms (cough, fever, or others) AND [3] no complications or SOB   Negative: Fever present > 3 days (72 hours)   Negative: [1] Fever returns after gone for over 24 hours AND [2] symptoms worse or not improved   Negative: [1] Continuous (nonstop) coughing interferes with work or school AND [2] no improvement using cough treatment per protocol   Negative: Cough present > 3 weeks    Protocols used: CORONAVIRUS (COVID-19) DIAGNOSED OR [id number]

## 2020-09-03 NOTE — TELEPHONE ENCOUNTER
----- Message from Divya Buckley sent at 9/3/2020 11:43 AM CDT -----  Contact: self [phone]  Pt states he should have orders for COVID testing. Pt was advised there are no orders on his appt desk. Please call and advise.

## 2020-09-04 LAB — SARS-COV-2 RNA RESP QL NAA+PROBE: DETECTED

## 2020-09-04 NOTE — PROGRESS NOTES
Please tell Mr. Stern he is indeed Covid positive. Tylenol or Nsaids for fever.  Stay hydrated.  He should begin his 14 day quarintine from yesterday which is his date of positive test.  He should be enrolled in the Covid monitoring so he should have or get his pulse ox to monitor his O2 sats and please let us know if his symptoms worsen.  I will check on him early next week.

## 2020-09-04 NOTE — PROGRESS NOTES
Spoke with patient regarding his results and Dr. Alfonso's recommendations, patient expressed understanding and stated he woke up this morning feeling good , he only have a little cough that started this past Sunday, a running nose which he thinks it from the testing swab that went up his nose. Patient does have a pulse Ox which shows his sats at 97, quarantine started yesterday. Patient will call if needed.

## 2020-09-06 ENCOUNTER — NURSE TRIAGE (OUTPATIENT)
Dept: ADMINISTRATIVE | Facility: CLINIC | Age: 40
End: 2020-09-06

## 2020-09-06 NOTE — TELEPHONE ENCOUNTER
Patient auto populated in queue as a no response. At 1130, no entry made, so patient contacted. Patient did not answer, only one number in chart, left VM per protocol.    Reason for Disposition   Message left on unidentified voice mail.  Phone number verified.    Additional Information   Negative: Caller is angry or rude (e.g., hangs up, verbally abusive, yelling)   Negative: Caller hangs up   Negative: Caller has already spoken with the PCP and has no further questions.   Negative: Caller has already spoken with another triager and has no further questions.   Negative: Caller has already spoken with another triager or PCP AND has further questions AND triager able to answer questions.   Negative: Busy signal.  First attempt to contact caller.  Follow-up call scheduled within 15 minutes.   Negative: No answer.  First attempt to contact caller.  Follow-up call scheduled within 15 minutes.   Negative: Message left on identified voice mail   Negative: Message left with person in household.   Negative: Wrong number reached.  Phone number verified.   Negative: Second attempt to contact family AND no contact made.  Phone number verified.   Negative: Cell phone out of range.  Phone number verified.   Negative: Pager number given.  Answering service notified.   Negative: Patient already left for the hospital/clinic.   Negative: Caller has cancelled the call before the first contact   Negative: Unable to complete triage due to phone connection issues   Negative: NON-URGENT call redirected to PCP's office because it is open    Protocols used: NO CONTACT OR DUPLICATE CONTACT CALL-A-

## 2020-09-08 ENCOUNTER — NURSE TRIAGE (OUTPATIENT)
Dept: ADMINISTRATIVE | Facility: CLINIC | Age: 40
End: 2020-09-08

## 2020-09-14 ENCOUNTER — NURSE TRIAGE (OUTPATIENT)
Dept: ADMINISTRATIVE | Facility: CLINIC | Age: 40
End: 2020-09-14

## 2020-09-14 NOTE — TELEPHONE ENCOUNTER
Pt escalated as a High Alert in [facility].    High alert due to O2 Sat of 86%.    Spoke with pt who informed Nurse that he entered his O2 sat in error and it is actually 96%.  All other VS WNL.    No further escalation warranted.    Reason for Disposition   Information only question and nurse able to answer    Protocols used: INFORMATION ONLY CALL - NO TRIAGE-A-OH

## 2020-09-16 ENCOUNTER — PATIENT MESSAGE (OUTPATIENT)
Dept: ADMINISTRATIVE | Facility: OTHER | Age: 40
End: 2020-09-16

## 2020-10-27 ENCOUNTER — HOSPITAL ENCOUNTER (OUTPATIENT)
Dept: RADIOLOGY | Facility: HOSPITAL | Age: 40
Discharge: HOME | End: 2020-10-27
Attending: ORTHOPAEDIC SURGERY
Payer: COMMERCIAL

## 2020-10-27 ENCOUNTER — OFFICE VISIT (OUTPATIENT)
Dept: ORTHOPEDICS | Facility: CLINIC | Age: 40
End: 2020-10-27
Payer: COMMERCIAL

## 2020-10-27 DIAGNOSIS — M20.21 HALLUX RIGIDUS OF BOTH FEET: ICD-10-CM

## 2020-10-27 DIAGNOSIS — M20.22 HALLUX RIGIDUS OF BOTH FEET: ICD-10-CM

## 2020-10-27 DIAGNOSIS — R52 PAIN: ICD-10-CM

## 2020-10-27 DIAGNOSIS — R52 PAIN: Primary | ICD-10-CM

## 2020-10-27 PROCEDURE — 99999 PR PBB SHADOW E&M-EST. PATIENT-LVL I: ICD-10-PCS | Mod: PBBFAC,,, | Performed by: ORTHOPAEDIC SURGERY

## 2020-10-27 PROCEDURE — 99203 OFFICE O/P NEW LOW 30 MIN: CPT | Mod: S$GLB,,, | Performed by: ORTHOPAEDIC SURGERY

## 2020-10-27 PROCEDURE — 73630 XR FOOT COMPLETE 3 VIEW BILATERAL: ICD-10-PCS | Mod: 26,,, | Performed by: RADIOLOGY

## 2020-10-27 PROCEDURE — 73630 X-RAY EXAM OF FOOT: CPT | Mod: TC,50

## 2020-10-27 PROCEDURE — 73630 X-RAY EXAM OF FOOT: CPT | Mod: 26,,, | Performed by: RADIOLOGY

## 2020-10-27 PROCEDURE — 99203 PR OFFICE/OUTPT VISIT, NEW, LEVL III, 30-44 MIN: ICD-10-PCS | Mod: S$GLB,,, | Performed by: ORTHOPAEDIC SURGERY

## 2020-10-27 PROCEDURE — 99999 PR PBB SHADOW E&M-EST. PATIENT-LVL I: CPT | Mod: PBBFAC,,, | Performed by: ORTHOPAEDIC SURGERY

## 2020-10-27 RX ORDER — NAPROXEN 500 MG/1
500 TABLET ORAL EVERY 12 HOURS PRN
Qty: 60 TABLET | Refills: 0 | Status: SHIPPED | OUTPATIENT
Start: 2020-10-27 | End: 2020-11-26

## 2020-10-27 NOTE — PROGRESS NOTES
Felix Garciafahadmonika  Returns today for follow-up.  This is a 40-year-old gentleman who I saw in was 4 years ago for hallux rigidus of his right big toe.  Treatment options were discussed at that time including cheilectomy.  He declined to have any surgery at that time but has had some worsening pain recently about his right big toe as well as some symptoms of his left big toe.  Comes in for further evaluation.  He reports symptoms consistent with arthritis with morning stiffness and pain with prolonged activity.  He also reports pain with certain shoes about his right foot due to the bony prominence.    Past Medical History:   Diagnosis Date    GERD (gastroesophageal reflux disease)        Examination:  This is a well-developed well-nourished male who walks in with a normal gait.  He has plantigrade alignment of his feet.  He has some noticeable enlargement of his right big toe MTP joint compared to the left.  On sitting exam he has decreased but functional motion of both big toes.  He has pain with extreme dorsiflexion on the right side.  He has mild pain through the mid range of motion of both big toes.  He has normal motion of his ankle and subtalar joints bilaterally.  He has full function and strength of all the tendons about his foot and ankle.  He is neurovascularly intact.    Imaging:  I ordered and reviewed standing x-rays of both feet today.  I was able to compared to x-rays of his right foot from 4 years ago.  There has been some mild progression of arthritis of the big toe MTP joint on the right.  There has been some mild enlargement of the osteophyte formation dorsally on the right side.  The left foot reveals some narrowing of the MTP joint with minimal osteophyte formation.    Impression:  Bilateral hallux rigidus and MTP arthritis    Recommendation:  Treatment options were again discussed.  He would still be a candidate for cheilectomy on the right side.  He does have arthritic symptoms bilaterally as  well and he understands that this cannot be resolved.  I would not recommend fusion surgery at this point.  He is going to think about possible cheilectomy on the right side and will continue to treat symptoms conservatively otherwise.

## 2020-10-28 ENCOUNTER — TELEPHONE (OUTPATIENT)
Dept: ORTHOPEDICS | Facility: CLINIC | Age: 40
End: 2020-10-28

## 2020-11-03 ENCOUNTER — OFFICE VISIT (OUTPATIENT)
Dept: SPORTS MEDICINE | Facility: CLINIC | Age: 40
End: 2020-11-03
Payer: COMMERCIAL

## 2020-11-03 ENCOUNTER — HOSPITAL ENCOUNTER (OUTPATIENT)
Dept: RADIOLOGY | Facility: HOSPITAL | Age: 40
Discharge: HOME | End: 2020-11-03
Attending: ORTHOPAEDIC SURGERY
Payer: COMMERCIAL

## 2020-11-03 VITALS
HEIGHT: 66 IN | SYSTOLIC BLOOD PRESSURE: 127 MMHG | HEART RATE: 75 BPM | WEIGHT: 167 LBS | BODY MASS INDEX: 26.84 KG/M2 | DIASTOLIC BLOOD PRESSURE: 99 MMHG

## 2020-11-03 DIAGNOSIS — M25.512 LEFT SHOULDER PAIN, UNSPECIFIED CHRONICITY: Primary | ICD-10-CM

## 2020-11-03 DIAGNOSIS — M25.512 LEFT SHOULDER PAIN, UNSPECIFIED CHRONICITY: ICD-10-CM

## 2020-11-03 PROCEDURE — 73030 XR SHOULDER COMPLETE 2 OR MORE VIEWS LEFT: ICD-10-PCS | Mod: 26,LT,, | Performed by: RADIOLOGY

## 2020-11-03 PROCEDURE — 20610 LARGE JOINT ASPIRATION/INJECTION: L SUBACROMIAL BURSA: ICD-10-PCS | Mod: LT,S$GLB,, | Performed by: ORTHOPAEDIC SURGERY

## 2020-11-03 PROCEDURE — 20610 DRAIN/INJ JOINT/BURSA W/O US: CPT | Mod: LT,S$GLB,, | Performed by: ORTHOPAEDIC SURGERY

## 2020-11-03 PROCEDURE — 3008F PR BODY MASS INDEX (BMI) DOCUMENTED: ICD-10-PCS | Mod: CPTII,S$GLB,, | Performed by: ORTHOPAEDIC SURGERY

## 2020-11-03 PROCEDURE — 99203 PR OFFICE/OUTPT VISIT, NEW, LEVL III, 30-44 MIN: ICD-10-PCS | Mod: 25,S$GLB,, | Performed by: ORTHOPAEDIC SURGERY

## 2020-11-03 PROCEDURE — 99999 PR PBB SHADOW E&M-EST. PATIENT-LVL III: CPT | Mod: PBBFAC,,, | Performed by: ORTHOPAEDIC SURGERY

## 2020-11-03 PROCEDURE — 73030 X-RAY EXAM OF SHOULDER: CPT | Mod: 26,LT,, | Performed by: RADIOLOGY

## 2020-11-03 PROCEDURE — 3008F BODY MASS INDEX DOCD: CPT | Mod: CPTII,S$GLB,, | Performed by: ORTHOPAEDIC SURGERY

## 2020-11-03 PROCEDURE — 99203 OFFICE O/P NEW LOW 30 MIN: CPT | Mod: 25,S$GLB,, | Performed by: ORTHOPAEDIC SURGERY

## 2020-11-03 PROCEDURE — 99999 PR PBB SHADOW E&M-EST. PATIENT-LVL III: ICD-10-PCS | Mod: PBBFAC,,, | Performed by: ORTHOPAEDIC SURGERY

## 2020-11-03 PROCEDURE — 73030 X-RAY EXAM OF SHOULDER: CPT | Mod: TC,LT

## 2020-11-03 RX ORDER — TRIAMCINOLONE ACETONIDE 40 MG/ML
60 INJECTION, SUSPENSION INTRA-ARTICULAR; INTRAMUSCULAR
Status: DISCONTINUED | OUTPATIENT
Start: 2020-11-03 | End: 2020-11-03 | Stop reason: HOSPADM

## 2020-11-03 RX ADMIN — TRIAMCINOLONE ACETONIDE 60 MG: 40 INJECTION, SUSPENSION INTRA-ARTICULAR; INTRAMUSCULAR at 09:11

## 2020-11-03 NOTE — PROGRESS NOTES
"CC: LEFT shoulder pain     40 y.o. RHD  Male with a history of left shoulder pain. He says the pain began over the past 6-8 months. He had no inciting events. He cannot localize the pain when asked. It comes and goes, more severe when he is exerting himself  He states that the pain is severe and not responding to any conservative care. He has taken some NSAIDs. He has not tried PT or injections.      He reports that the pain and weakness is worse with flexing his shoulder and sometimes overhead. It also bothers him at night.    Is affecting ADLs.  Pain is 9/10 at it's worst.    Says he had a fall yesterday that resulted in left neck pain. Has had some neck issues in the past. No numbness/tingling. Says he has always been "weaker" in the left arm.    No major medical history.      Past Medical History:   Diagnosis Date    GERD (gastroesophageal reflux disease)        Past Surgical History:   Procedure Laterality Date    HAND SURGERY         Family History   Problem Relation Age of Onset    Colon cancer Neg Hx     Esophageal cancer Neg Hx     Stomach cancer Neg Hx     Rectal cancer Neg Hx          Current Outpatient Medications:     acyclovir (ZOVIRAX) 400 MG tablet, , Disp: , Rfl:     esomeprazole magnesium (NEXIUM) 10 mg GrPS, Take 10 mg by mouth before breakfast., Disp: , Rfl:     famotidine (PEPCID) 10 MG tablet, Take 10 mg by mouth 2 (two) times daily., Disp: , Rfl:     meloxicam (MOBIC) 15 MG tablet, TAKE 1 TABLET BY MOUTH DAILY, Disp: 30 tablet, Rfl: 1    methocarbamol (ROBAXIN) 750 MG Tab, TAKE 1 TABLET BY MOUTH NIGHTLY AS NEEDED FOR SPASMS, Disp: 40 tablet, Rfl: 0    multivitamin (THERAGRAN) per tablet, Take 1 tablet by mouth once daily., Disp: , Rfl:     naproxen (NAPROSYN) 500 MG tablet, Take 1 tablet (500 mg total) by mouth every 12 (twelve) hours as needed., Disp: 60 tablet, Rfl: 0    pulse oximeter (PULSE OXIMETER) device, by Apply Externally route 2 (two) times a day. Use twice daily at 8 " "AM and 3 PM and record the value in MyChart as directed., Disp: 1 each, Rfl: 0    ranitidine (ZANTAC) 15 mg/mL syrup, Take by mouth every 12 (twelve) hours., Disp: , Rfl:     VENTOLIN HFA 90 mcg/actuation inhaler, , Disp: , Rfl:     Review of patient's allergies indicates:  No Known Allergies       REVIEW OF SYSTEMS:  Constitution: Negative. Negative for chills, fever and night sweats.   HENT: Negative for congestion and headaches.    Eyes: Negative for blurred vision, left vision loss and right vision loss.   Cardiovascular: Negative for chest pain and syncope.   Respiratory: Negative for cough and shortness of breath.    Endocrine: Negative for polydipsia, polyphagia and polyuria.   Hematologic/Lymphatic: Negative for bleeding problem. Does not bruise/bleed easily.   Skin: Negative for dry skin, itching and rash.   Musculoskeletal: Negative for falls.  Positive for left shoulder pain and muscle weakness.   Gastrointestinal: Negative for abdominal pain and bowel incontinence.   Genitourinary: Negative for bladder incontinence and nocturia.   Neurological: Negative for disturbances in coordination, loss of balance and seizures.   Psychiatric/Behavioral: Negative for depression. The patient does not have insomnia.    Allergic/Immunologic: Negative for hives and persistent infections.      PHYSICAL EXAMINATION:  Vitals:  BP (!) 127/99   Pulse 75   Ht 5' 6" (1.676 m)   Wt 75.8 kg (167 lb)   BMI 26.95 kg/m²    General: The patient is alert and oriented x 3.  Mood is pleasant.  Observation of ears, eyes and nose reveal no gross abnormalities.  No labored breathing observed.  Gait is coordinated. Patient can toe walk and heel walk without difficulty.      LEFT Shoulder / Upper Extremity Exam    OBSERVATION:     Swelling  none  Deformity  none   Discoloration  none   Scapular winging none   Scars   none  Atrophy  none    TENDERNESS / CREPITUS (T/C):          T/C      T/C   Clavicle   -/-  SUPRAspinatus    -/-     AC " Jt.    -/-  INFRAspinatus  -/-    SC Jt.    -/-  Deltoid    -/-      G. Tuberosity  -/-  LH BICEP groove  -/-   Acromion:  -/-  Midline Neck   -/-     Scapular Spine -/-  Trapezium   -/-   SMA Scapula  -/-  GH jt. line - post  -/-     Scapulothoracic  -/-         ROM: (* = with pain)  Right shoulder   Left shoulder        AROM (PROM)   AROM (PROM)   FE    170° (175°)     170° (175°)     ER at 0°    60°  (65°)    60°  (65°)   ER at 90° ABD  90°  (90°)    90°  (90°)   IR at 90°  ABD   NA  (40°)     NA  (40°)      IR (spine level)   T10     T10    STRENGTH: (* = with pain) Right shoulder   Left shoulder    SCAPTION   5/5    4/5    IR    5/5    5/5   ER    5/5    5/5   BICEPS   5/5    5/5   Deltoid    5/5    5/5     SIGNS:  Painful side       NEER   +    OAKSHATS  neg    ALFARO   -    SPEEDS  neg     DROP ARM   -   BELLY PRESS neg   Superior escape none    LIFT-OFF  neg   X-Body ADD    neg    MOVING VALGUS neg        STABILITY TESTING    Right shoulder   Left shoulder    Translation     Anterior  up face     up face    Posterior  up face    up face    Sulcus   < 10mm    < 10 mm     Signs   Apprehension   neg      neg       Relocation   no change     no change      Jerk test  neg     neg    EXTREMITY NEURO-VASCULAR EXAM:    Sensation grossly intact to light touch all dermatomal regions.    DTR 2+ Biceps, Triceps, BR and Negative Johns sign   Grossly intact motor function at Elbow, Wrist and Hand   Distal pulses radial and ulnar 2+, brisk cap refill, symmetric.      NECK:  Painless FROM and spinous processes non-tender. Negative Spurlings sign.      OTHER FINDINGS:  No scapular dyskinesia    XRAYS:  Xrays including AP, Outlet and Axillary Lateral of Left shoulder are ordered / images reviewed by me:   No fracture dislocation or other pathology   Acromion type 2   Proximal migration of humeral head: None   GH arthritis: None          ASSESSMENT:   Left shoulder pain, possible:  1. Rotator cuff impingement   2.     3.    PLAN:      1. Left subacromial corticosteroid injection today  2. Periscapular/shoulder strengthening  3. RTC 6-8 weeks to assess how home therapy/injection has worked    All questions were answered, patient will contact us for questions or concerns in the interim.

## 2020-11-03 NOTE — LETTER
November 3, 2020      Ran Olivas MD  [address]  [facility] [address]           [facility] 1st Fl  [address]  [facility] [address]  Phone: [phone]          Patient: Felix Stern   MR Number: [id number]   YOB: 1980   Date of Visit: 11/3/2020       Dear Dr. Ran Olivas:    Thank you for referring Felix Stern to me for evaluation. Attached you will find relevant portions of my assessment and plan of care.    If you have questions, please do not hesitate to call me. I look forward to following Felix Stern along with you.    Sincerely,    Jacobo Grove MD    Enclosure  CC:  No Recipients    If you would like to receive this communication electronically, please contact [email or web address] or [phone] to request more information on [employer] access.    For providers and/or their staff who would like to refer a patient to [facility], please contact us through our one-stop-shop provider referral line, [facility], at [phone].    If you feel you have received this communication in error or would no longer like to receive these types of communications, please e-mail [email or web address]

## 2020-11-03 NOTE — PROCEDURES
Large Joint Aspiration/Injection: L subacromial bursa    Date/Time: 11/3/2020 9:15 AM  Performed by: Jacobo Grove MD  Authorized by: Jacobo Grove MD     Consent Done?:  Yes (Verbal)  Indications:  Pain  Site marked: the procedure site was marked    Timeout: prior to procedure the correct patient, procedure, and site was verified    Prep: patient was prepped and draped in usual sterile fashion      Details:  Needle Size:  22 G  Ultrasonic Guidance for needle placement?: No    Approach:  Posterior  Location:  Shoulder  Site:  L subacromial bursa  Medications:  60 mg triamcinolone acetonide 40 mg/mL  Patient tolerance:  Patient tolerated the procedure well with no immediate complications

## 2020-12-09 ENCOUNTER — PATIENT MESSAGE (OUTPATIENT)
Dept: ORTHOPEDICS | Facility: CLINIC | Age: 40
End: 2020-12-09

## 2020-12-21 ENCOUNTER — PATIENT MESSAGE (OUTPATIENT)
Dept: ORTHOPEDICS | Facility: CLINIC | Age: 40
End: 2020-12-21

## 2021-02-01 ENCOUNTER — OFFICE VISIT (OUTPATIENT)
Dept: UROLOGY | Facility: CLINIC | Age: 41
End: 2021-02-01
Payer: COMMERCIAL

## 2021-02-01 VITALS
BODY MASS INDEX: 27.77 KG/M2 | HEART RATE: 83 BPM | WEIGHT: 172.81 LBS | DIASTOLIC BLOOD PRESSURE: 85 MMHG | HEIGHT: 66 IN | SYSTOLIC BLOOD PRESSURE: 136 MMHG

## 2021-02-01 DIAGNOSIS — Z30.09 VASECTOMY EVALUATION: Primary | ICD-10-CM

## 2021-02-01 PROCEDURE — 99999 PR PBB SHADOW E&M-EST. PATIENT-LVL III: CPT | Mod: PBBFAC,,, | Performed by: UROLOGY

## 2021-02-01 PROCEDURE — 3008F BODY MASS INDEX DOCD: CPT | Mod: CPTII,S$GLB,, | Performed by: UROLOGY

## 2021-02-01 PROCEDURE — 1126F PR PAIN SEVERITY QUANTIFIED, NO PAIN PRESENT: ICD-10-PCS | Mod: S$GLB,,, | Performed by: UROLOGY

## 2021-02-01 PROCEDURE — 99999 PR PBB SHADOW E&M-EST. PATIENT-LVL III: ICD-10-PCS | Mod: PBBFAC,,, | Performed by: UROLOGY

## 2021-02-01 PROCEDURE — 3008F PR BODY MASS INDEX (BMI) DOCUMENTED: ICD-10-PCS | Mod: CPTII,S$GLB,, | Performed by: UROLOGY

## 2021-02-01 PROCEDURE — 1126F AMNT PAIN NOTED NONE PRSNT: CPT | Mod: S$GLB,,, | Performed by: UROLOGY

## 2021-02-01 PROCEDURE — 99203 OFFICE O/P NEW LOW 30 MIN: CPT | Mod: S$GLB,,, | Performed by: UROLOGY

## 2021-02-01 PROCEDURE — 99203 PR OFFICE/OUTPT VISIT, NEW, LEVL III, 30-44 MIN: ICD-10-PCS | Mod: S$GLB,,, | Performed by: UROLOGY

## 2021-02-01 RX ORDER — PANTOPRAZOLE SODIUM 40 MG/1
40 TABLET, DELAYED RELEASE ORAL DAILY
COMMUNITY
Start: 2020-11-15 | End: 2023-03-01

## 2021-02-01 RX ORDER — CITALOPRAM 20 MG/1
20 TABLET, FILM COATED ORAL DAILY
COMMUNITY
Start: 2020-12-10

## 2021-07-20 ENCOUNTER — TELEPHONE (OUTPATIENT)
Dept: UROLOGY | Facility: CLINIC | Age: 41
End: 2021-07-20

## 2021-07-20 DIAGNOSIS — Z30.09 VASECTOMY EVALUATION: Primary | ICD-10-CM

## 2021-07-20 RX ORDER — HYDROCODONE BITARTRATE AND ACETAMINOPHEN 5; 325 MG/1; MG/1
1 TABLET ORAL EVERY 6 HOURS PRN
Qty: 7 TABLET | Refills: 0 | Status: SHIPPED | OUTPATIENT
Start: 2021-07-20 | End: 2021-07-25

## 2021-07-20 RX ORDER — DIAZEPAM 5 MG/1
5 TABLET ORAL ONCE
Qty: 3 TABLET | Refills: 0 | Status: ON HOLD | OUTPATIENT
Start: 2021-07-20 | End: 2022-12-08 | Stop reason: HOSPADM

## 2021-07-25 NOTE — PATIENT INSTRUCTIONS
[facility] Hospitalist Progress Note        Subjective:  Patient intubated 7/22 and FiO2 of 40% not on sedation but right chest tube in place with a leak per intensivist possible bronchopleural fistula.  Afebrile.  Hypotensive in 1 mesfin of Levophed.  Discussed with intensivist and ID.    Physical Exam  Blood pressure (!) 89/64, pulse 88, temperature 96.8 °F (36 °C), temperature source Temporal, resp. rate (!) 28, height 6' (1.829 m), weight 68.1 kg (150 lb 2.1 oz), SpO2 100 %.     Not physically seen secondary to Covid infection and to reduce exposure.   Discussed with intensivist Dr. Quintanilla.  Intubated and sedated on FiO2 of 40%.  Right-sided chest tube with air leak.  Off sedation.  Discussed with RN Dee Dee.        Current Medications:  Current Facility-Administered Medications   Medication   • NORepinephrine (LEVOPHED) 8 mg/250 mL in sodium chloride 0.9 % infusion   • sodium chloride 0.9% infusion   • VANCOMYCIN - PHARMACIST MONITORED Misc   • remdesivir 100 mg in sodium chloride 0.9 % 250 mL total volume infusion   • vancomycin (VANCOCIN) 1,000 mg in sodium chloride 0.9 % 250 mL IVPB   • dolutegravir (TIVICAY) tablet 50 mg   • emtricitabine-tenofovir DISOPROXIL (TRUVADA) 200-300 MG per tablet 1 tablet   • enoxaparin (LOVENOX) injection 40 mg   • lactulose (CHRONULAC) 10 GM/15ML solution 20 g   • chlorhexidine gluconate (PERIDEX) 0.12 % solution 15 mL    And   • chlorhexidine gluconate (PERIDEX) 0.12 % solution 15 mL   • petrolatum (white)-mineral oil ophthalmic ointment 1 application   • fentaNYL (SUBLIMAZE) 2,500 mcg/250 mL in sodium chloride 0.9 % infusion    And   • fentaNYL bolus from bag  mcg   • propofol (DIPRIVAN) infusion   • MIDAZolam (VERSED) injection 2-4 mg   • docusate sodium-sennosides (SENOKOT S) 50-8.6 MG 2 tablet   • bisacodyl (DULCOLAX) suppository 10 mg   • pantoprazole (PROTONIX INJECT) injection 40 mg   • acetaminophen (TYLENOL) tablet 650 mg   • insulin lispro (ADMELOG,HumaLOG) - Correction Dose  Try 300mg of zantac twice daily    For GERD:    Take your PPI 30-45 minutes before your first protein containing meal (breakfast) every day.    Remain upright for at least 3 hours after eating.    Elevate the head of the bead about 6 inches.  Some patients place cinder blocks under the head of the bed for elevation.    Avoid foods that you have noticed make your symptoms worse.    Set a weight loss goal of 10% of your body weight. (For example, if you weigh 150 lbs, your goal should be to loose 15 lbs).      Http://www.refluxcookbook.com/  Dropping Acid The Reflux Diet Cookbook and Kip -  Alan Flores M.D.    GERD  Worst Foods for Acid Reflux  Chocolate (milk chocolate worse than dark chocolate)  Soda (all carbonated beverages)  Alcohol (beer, liquor, wine)  Fried foods  [place], sausage, ribs  Cream sauce  Fatty meats (beef)  Butter, margarine, lard, shortening  Coffee, tea  Mint   High fat nuts  Hot sauces and pepper  Citrus fruit/juices      Acidic foods (pH - 1 is MORE acidic, 5 is LESS acidic)     Do not eat or drink these (lower numbers are worse)    Induction diet - For 2 weeks eat nothing below pH 5     Lemon juice 2.3  Grape cranberry juice 2.5  Stomach Acid 2.5  Gelatin Dessert 2.6  Lemon/lime 2.9/2.7  Vinegar 2.9  Gatorade 3.0  Fruits - plums, apricots, strawberries, cherries 3.0  Vitamin C (ascorbic acid) 3.0  Iced tea, Snapple 3.1  Mustard 3.2  Soft drinks 3.3  Nectarines 3.3  Pomegranate 3.3  Applesauce 3.4  Grapefruit 3.4  Kiwi 3.4  Barbecue sauce 3.4  Caesar dressing 3.5  Th[place] island dressing 3.6  Strawberries 3.5  Pineapple juice 3.5  Beer 3.5  Wine 3.5  Grape 3.6  Apples 3.6  Pineapple 3.7  Pickle 3.7  Blackberries, blueberries 3.7  Michel 3.7  Orange 3.8  Cherries 3.9  Red Bull 3.9  Tomatoes 4.2  Coffee 5.1      These are Safe foods:  Agave  Aloe Vera  Apple (only red)  Bagels  Banana (worsens reflux in 1%)  Beans - black, red, lima, lentils  Bread - whole grain, rye  Caramel  Celery  Chamomile    • dextrose 50 % injection 25 g   • dextrose 50 % injection 12.5 g   • glucagon (GLUCAGEN) injection 1 mg   • dextrose (GLUTOSE) 40 % gel 15 g   • dextrose (GLUTOSE) 40 % gel 30 g   • dextrose 5 % flush IVPB 250 mL   • hydroCORTisone (Solu-CORTEF) PF injection 50 mg   • sodium chloride (NORMAL SALINE) 0.9 % bolus 500 mL   • cefepime (MAXIPIME) 2,000 mg in sodium chloride 0.9 % 100 mL IVPB   • azithromycin (ZITHROMAX) 500 mg in sodium chloride 0.9 % 250 mL IVPB   • sodium chloride 0.9 % flush bag 25 mL   • Potassium Standard Replacement Protocol   • Magnesium Standard Replacement Protocol         Labs  Recent Labs   Lab 07/25/21  0512 07/24/21  0458 07/23/21  0429   WBC 3.2* 5.7 6.9   HGB 6.6* 7.6* 9.1*   HCT 20.9* 24.1* 29.2*   MCV 94.6 93.4 94.5   MCH 29.9 29.5 29.4   MCHC 31.6* 31.5* 31.2*   PLT 44* 63* 79*       Recent Labs   Lab 07/25/21  0512 07/24/21  0457 07/23/21  0428   SODIUM 138 134* 135   POTASSIUM 4.5 4.7 5.3*   CHLORIDE 109* 106 108*   CO2 23 22 24   BUN 30* 34* 32*   CREATININE 0.84 0.86 0.87   GLUCOSE 154* 160* 151*   CALCIUM 7.0* 7.2* 7.1*   * 238*  238* 152*   GPT 65* 65*  65* 49       Recent Labs   Lab 07/24/21  1048 07/24/21  0458 07/24/21  0457 07/23/21  2248 07/23/21  0428 07/22/21  1102 07/22/21  0316 07/22/21  0315 07/21/21  2322 07/20/21 2025 07/20/21  1734   RAPDTR  --   --  0.68*  --  1.76*  --  2.20*  --   --    < > 0.05*   LACTA 2.6* 3.2*  --  2.7*  --  6.4* 7.4*  --    < >  --   --    PT  --   --   --   --   --  13.7*  --  12.6*  --   --  10.7   INR  --   --   --   --   --  1.3  --  1.2  --   --  1.0    < > = values in this interval not displayed.          Recent Labs   Lab 07/25/21  0512 07/24/21  0457 07/23/21  0428 07/20/21  1734   ALKPT 200* 220*  220* 227* 147*   BILIRUBIN 0.3 0.5  0.5 0.9 0.9   DBIL 0.1 0.3*  --  0.4*   ALBUMIN 1.5* 1.7*  1.7* 1.8* 2.6*   GPT 65* 65*  65* 49 27   * 238*  238* 152* 63*         Microbiology Results  (Last 10 results in the  tea  Chicken - skinless, never fried  Chicken stock or bouillon  Coffee - one cup/day with milk  Fennel  Fish  Venecia  Green vegetables (no green peppers)  Herbs  Honey  Melon  Milk - skin, soy, or Lactaid skim milk  Mushrooms  Oatmeal  Olive oil  Parsley  Pasta  Pears  Popcorn  Potatoes  Red bell peppers  Rice  Soups  Tofu  Turkey Breast  Turnip  Vegetables - no onion, tomatoes, peppers  Vinaigrette  Water - non carbonated  Whole grain breads, crackers, breakfast cereals      Best Foods for Acid Reflux  Whole grain breads  Oatmeal  Aloe Vera  Salad (no tomatoes, onions, cheese, or high fat dressing)  Banana  Melon  Fennel  Chicken and turkey (skinless, never fried)  Fish/seafood (never fried)  Celery  Parsley  Couscous and Rice    Maybe bad foods (Everyone is unique)  Tomatoes  Garlic  Onion  Nuts (macadamia nuts)  Apples (especially green)  Cucumber  Green peppers  Spicy food  Some herbal teas    GERD tips  Change what you eat:  Eat smaller meals  Eat slowly and chew thoroughly until food is almost liquid  Cut down on junk carbohydrates such as sugar and white flour  Use herbs in your cooking  Eat more raw foods (more than 10 ingredients is not a raw food)  Avoid trans fats and partially hydrogenated oils  Eat more fish and switch to grass fed beef  Switch your cooking oil to macadamia nut or olive oil  Watch extremes of salt intake (too high or too low is bad)    If just cutting out acidic foods is not enough, change how you eat:  Large breakfast, medium lunch, light dinner  Dont mix fruit juices, sweet fruits, and refined starches with meats and heavy food  Dont wash your food down with a lot of liquid    List A Proteins - meat, poultry, cheese, eggs, fish, beans, yogurt    List B Neutral - vegetables, salads, seeds, nuts, herbs, cream, butter, olive oil    List C Starches - biscuit, breads, cake, crackers, oats, pasta, potatoes, rice, sugar/honey, sweets    A + B = ok  B + C = ok  Never mix list A and  C!!    Change these habits:  Stop smoking  Eat dinner earlier (3-4 hours before lying down to sleep)  Elevate the head of your bed 6 inches (blocks under the head of the bed are better than pillows)  Exercise (but wait 2 hours after eating)  Drink more water (between meals)    Take these supplements:  Multi vitamin  Probiotic  Fish oil    Most common food allergens: milk, eggs, peanuts, tree nuts, fish, shellfish, wheat, and soy    All natural immediate relief:  Chew 2-3 soft probiotic capsules - Dr. Gomez's Probiotics 12 Plus  Chew chewable DGL licorice tablet  Chew papaya tablet with high protein meal - [employer]  Drink 2 ounces of aloe vera juice  Swedish bitters  Prelief- reduce the acid in food to keep it form burning sensitive tissue  Iberogast  [place]  Drink Chamomile Tea  Teaspoon of baking soda in water  Spoonful of vinegar in water      All natural ulcer healers:  Zinc carnosine - 75.5 mg with food twice a day x 8 weeks   Padmini Huston - $8 for 60 pills  DGL (deglycyrrhizinated licorice) - 2 tablets before meals. Heals stomach lining   Natural [employer] brand, Enzymatic therapy brand.  Aloe Vera juice  - 2 to 8 ounces a day   [place] or [place]                                                           past 7 days)    Specimen   Gram Smear   Culture Result   Status       07/24/21  1211         Few Polymorphonuclear cells.[P]          No epithelial cells seen.[P]          No organisms seen.[P]         07/20/21  1736         Gram negative bacilli.  Comment:  Detected from aerobic bottle after 20 hours.         07/20/21  1734         Gram negative bacilli.  Comment:  Detected from aerobic bottle after 20 hours.                 COVID Labs  Recent Labs   Lab 07/24/21  0457 07/23/21  0428 07/22/21  1102 07/22/21  0316 07/22/21  0315 07/21/21  0929 07/21/21  0514 07/20/21  1734   FERR  --   --   --   --  4,300*  --   --  3,331*   DDIMER  --   --   --   --  12.76* 1.08*  --   --    INR  --   --  1.3  --  1.2  --   --  1.0   LDH  --   --   --  310*  --   --  291*  --    RAPDTR 0.68* 1.76*  --  2.20*  --   --   --  0.05*     Imaging  XR CHEST PA OR AP 1 VIEW   Final Result       As above.         Electronically Signed by: ARIANA ARGUELLO M.D.    Signed on: 7/24/2021 9:37 AM          XR CHEST PA OR AP 1 VIEW   Final Result   Right upper lobe pneumothorax is smaller.      See above for other findings.      Electronically Signed by: ARIANA ARGUELLO M.D.    Signed on: 7/24/2021 8:23 AM          XR CHEST PA OR AP 1 VIEW   Final Result      1.  Stable to minimally decreased right-sided pneumothorax which was only   partially visualized previously.   2.  Otherwise, no significant interval change.      Electronically Signed by: HARRIET REYES M.D.    Signed on: 7/23/2021 8:58 AM          XR CHEST PA OR AP 1 VIEW   Final Result       Nasogastric tube tip in the stomach. The lung apices not included on the   current examination, there is a right upper lobe pneumothorax. Larger   compared to the prior study in which it measured 2.2 cm. Right-sided chest   tube is present. Extensive infiltrates bilaterally with consolidation.         Electronically Signed by: ARIANA ARGUELLO M.D.    Signed on: 7/22/2021 12:21 PM          US BILIARY TREE    Final Result   1.   Distended gallbladder with sludge and possible stones which are too   small to measure.     2.   No sonographic evidence of acute cholecystitis.   3.   No biliary ductal dilation.   4.   Hepatic steatosis      Electronically Signed by: BLANCA WEBER M.D.    Signed on: 7/22/2021 11:47 AM          XR CHEST PA OR AP 1 VIEW   Final Result   1.  Lines/tubes as above.   2.  Mildly decreased size of the right pneumothorax with right chest tube   in place.   3.  Extensive interstitial and alveolar opacities consistent with edema,   infection, or hemorrhage, compatible with the given history of confirmed   positive Covid 19 viral illness.      Electronically Signed by: BLANCA WEBER M.D.    Signed on: 7/22/2021 10:36 AM          XR CHEST PA OR AP 1 VIEW   Final Result   1.  Mildly increased size of the right pneumothorax with right chest tube   in place.   2.  Progression of extensive interstitial and alveolar opacities consistent   with edema, infection, or hemorrhage, consistent with the given history of   confirmed positive Covid 19 viral illness.      Julianne Luo RN, was notified of the findings over the telephone by Dr. Weber on 7/22/2021 at 10:30.      Electronically Signed by: BLANCA WEBER M.D.    Signed on: 7/22/2021 10:33 AM          [facility] EXTREMITY LOWER VENOUS DUPLEX   Final Result   There is no evidence of deep venous thrombosis in both lower   extremity interrogated veins.      Electronically Signed by: BLANCA WEBER M.D.    Signed on: 7/22/2021 8:02 AM          XR CHEST PA OR AP 1 VIEW   Final Result      1.  Interval increased consolidation in the lungs bilaterally suspicious   for worsening viral pneumonia given patient's clinical history of Covid   viral illness.   2.  Interval increased small right-sided pneumothorax.   3.  Small bilateral pleural effusions.   4.  Additional findings as described above.   5.  Continued follow-up is recommended.       Electronically Signed by: HARRIET REYES M.D.    Signed on: 7/21/2021 2:08 PM          XR CHEST PA OR AP 1 VIEW   Final Result       As above.         Electronically Signed by: DUSTY GALLOWAY M.D.    Signed on: 7/20/2021 10:20 PM          XR CHEST PA OR AP 1 VIEW   Final Result       As above.         Electronically Signed by: DUSTY GALLOWAY M.D.    Signed on: 7/20/2021 8:37 PM          CTA CHEST PULMONARY EMBOLISM W CONTRAST   Final Result      1.  No pulmonary embolism.      2.  Small bore right chest tube with small right pneumothorax.      3.  Opacities within the right upper lobe, left lower lobe, and minimally   in the lingula are suspicious for infection.      4.  Right middle and lower lobe opacities favor atelectasis but could be   secondary to infection as well.      5.  Moderate emphysema without lung mass or concerning lung nodule.      6.  Other findings detailed above.      Electronically Signed by: DUSTY GALLOWAY M.D.    Signed on: 7/20/2021 7:27 PM          XR CHEST PA OR AP 1 VIEW   Final Result   1.  Reexpansion of the right lung status post chest tube placement, with   small residual right pneumothorax.   2.  Linear and hazy densities bilaterally can be seen with edema,   atelectasis, and infection. Follow-up with full inspiratory PA and lateral   radiographs to ensure resolution is recommended.      Electronically Signed by: BLANCA GARRETT M.D.    Signed on: 7/20/2021 6:57 PM          XR CHEST PA OR AP 1 VIEW   Final Result      1.  As above.                        Electronically Signed by: DUSTY GALLOWAY M.D.    Signed on: 7/20/2021 6:10 PM              ECHO:  Recent Results (from the past 365 days)    TRANSTHORACIC ECHO (TTE) COMPLETE W/ W/O IMAGING AGENT    Impression  *[facility]*  [address] [place] [id number] [phone]  Transthoracic Echocardiogram (TTE)    Patient: Elton Guzman  Study Date/Time:         Jul 21 2021 11:48AM  MRN:      [id number]     FIN#:                    [id number]  :     1971   Ht/Wt:                   182.9cm 61.2kg  Age:     50           BSA/BMI:                 1.8m^2 18.3kg/m^2  Gender:  M            Baseline BP:             98 / 77  Ordering Physician:   Verónica Mcmanus    Referring Physician:  Verónica Mcmanus Laura    Attending Physician:  Dayanna Thomson  Performing Physician: [facility]  Diagnostic Physician: Shan Mar MD  Sonographer:          Chikis Doe    --------------------------------------------------------------------------  INDICATIONS:  Shock    --------------------------------------------------------------------------  STUDY CONCLUSIONS  SUMMARY:    1. Left ventricle: The cavity size is normal. Wall thickness is normal.  The ejection fraction was measured by visual estimation. The ejection  fraction is 25%.  2. Regional wall motion abnormalities: Akinesis of the apical septal,  apical lateral, and apical myocardium.  3. Aortic valve: Structurally normal valve. The valve is trileaflet. Mild  regurgitation.  4. Mitral valve: Mild regurgitation.  5. Left atrium: The atrium is mildly dilated.  6. Right ventricle: Systolic pressure is moderately increased. The  estimated peak pressure is 58mm Hg.  7. Atrial septum: The septum bows from left to right, consistent with  increased left atrial pressure. Color doppler shows no obvious shunt.  8. Pericardium, extracardiac: There is no pericardial effusion. No  evidence of pleural fluid accumulation.  9. Baseline ECG: Sinus tachycardia.  Impressions:  Moderate pulmonary hypertension.    --------------------------------------------------------------------------  STUDY DATA:  [place] There is no prior study available for  comparison at this time.  Procedure:  Transthoracic echocardiography was  performed. Image quality was adequate. The study was technically limited  due to poor acoustic window availability, restricted patient mobility,  body  habitus, and endotracheal tube.  M-mode, complete 2D, complete  spectral Doppler, and color Doppler.  Study status:  STAT.  Study  completion:  There were no complications.    FINDINGS    LEFT VENTRICLE:  The cavity size is normal. Wall thickness is normal.  Systolic function is severely reduced.  Severe global hypokinesis with  regional variations.    The ejection fraction was measured by visual  estimation. The ejection fraction is 25%.  Regional wall motion  abnormalities:  Akinesis of the apical septal, apical lateral, and apical  myocardium. The study is not technically sufficient to allow evaluation of  LV diastolic function.    AORTIC VALVE:   Structurally normal valve. The valve is trileaflet.   Cusp  separation is normal.  Doppler:  Transvalvular velocity is within the  normal range. There is no stenosis.  Mild regurgitation.    AORTA:  Aortic root: The aortic root is normal in size.  Ascending aorta: The ascending aorta is normal in size.    MITRAL VALVE:  The leaflets are mildly thickened. Leaflet separation is  normal.  Doppler:  Transvalvular velocity is within the normal range.  There is no evidence for stenosis.  Mild regurgitation.    The valve area  by pressure half-time is 5.1cm^2. The valve area index by pressure  half-time is 2.84cm^2/m^2.    ATRIAL SEPTUM:  The septum bows from left to right, consistent with  increased left atrial pressure.  Color doppler shows no obvious shunt.    LEFT ATRIUM:  The atrium is mildly dilated.    RIGHT VENTRICLE:  The cavity size is normal. Wall thickness is normal.  Systolic function is normal. Systolic pressure is moderately increased.  The estimated peak pressure is 58mm Hg.    PULMONIC VALVE:    Structurally normal valve.   Cusp separation is normal.  Doppler:  Transvalvular velocity is within the normal range.  No  regurgitation.    TRICUSPID VALVE:   Structurally normal valve.   Leaflet separation is  normal.  Doppler:  Transvalvular velocity is within the  normal range.  Moderate regurgitation.    PULMONARY ARTERY:   The main pulmonary artery is normal-sized. Systolic  pressure is within the normal range.    RIGHT ATRIUM:  The atrium is normal in size.    PERICARDIUM:  There is no pericardial effusion. No evidence of pleural  fluid accumulation.    SYSTEMIC VEINS:  Inferior vena cava: The vessel is normal in size. The respirophasic  diameter changes are in the normal range (greater than or equal to 50%).    BASELINE ECG:   Sinus tachycardia.    --------------------------------------------------------------------------  Measurements    Left ventricle                 Value        Ref        Right ventricle            Value          Ref  CALIN, LAX chord        (H)      6.4   cm     4.2 - 5.8  TAPSE, MM                  3.1   cm       1.7 - 3.1  ESD, LAX chord        (H)      5.6   cm     2.5 - 4.0  Pressure, S                58    mm Hg    ---------  CALIN/bsa, LAX chord    (H)      3.6   cm/m^2 2.2 - 3.0  S' lateral           (H)   15.8  cm/sec   6 - 13.4  ESD/bsa, LAX chord    (H)      3.1   cm/m^2 1.3 - 2.1  PW, ED, LAX                    0.8   cm     0.6 - 1.0  Left atrium                Value          Ref  CALIN major ax, A4C              8.9   cm     ---------  Area ES, A4C         (H)   23    cm^2     <=20  ESD major ax, A4C              8.4   cm     ---------  Area ES, A2C               19    cm^2     ---------  FS major axis, A4C             6     %      ---------  Vol/bsa, S           (H)   36    ml/m^2   16 - 34  CALIN/bsa major ax, A4C          5.0   cm/m^2 ---------  Vol, ES, 1-p A4C     (H)   65    ml       18 - 58  ESD/bsa major ax, A4C          4.6   cm/m^2 ---------  Vol/bsa, ES, 1-p A4C       36    ml/m^2   12 - 37  BETTY, A4C                       45.1  cm^2   ---------  Vol, ES, 1-p A2C     (H)   60    ml       18 - 58  FLO, A4C                       37.5  cm^2   ---------  Vol/bsa, ES, 1-p A2C       33    ml/m^2   11 - 43  FAC, A4C                       17     %      ---------  Vol, ES, 2-p               65    ml       ---------  PW, ED                         0.8   cm     0.6 - 1.0  Vol/bsa, ES, 2-p     (H)   36    ml/m^2   16 - 34  IVS/PW, ED                     0.84         ---------  EDV                   (H)      263   ml     62 - 150   Mitral valve               Value          Ref  ESV                   (H)      177   ml     21 - 61    Peak E                     0.61  m/sec    ---------  EF                    (L)      25    %      52 - 72    Peak A                     0.65  m/sec    ---------  SV                             55    ml     ---------  Decel time                 148   ms       ---------  EDV/bsa               (H)      146   ml/m^2 34 - 74    PHT                        43    ms       ---------  ESV/bsa               (H)      98    ml/m^2 11 - 31    Peak E/A ratio             1.3            ---------  SV/bsa                         31    ml/m^2 ---------  MVA, PHT                   5.1   cm^2     ---------  SV, 1-p A4C                    52    ml     ---------  MVA/bsa, PHT               2.84  cm^2/m^2 ---------  SV/bsa, 1-p A4C                29    ml/m^2 ---------  EDV, 2-p              (H)      204   ml     62 - 150   Tricuspid valve            Value          Ref  ESV, 2-p              (H)      149   ml     21 - 61    TR peak v            (H)   3.5   m/sec    <=2.8  SV, 2-p                        55    ml     ---------  Peak RV-RA grad, S         50    mm Hg    ---------  EDV/bsa, 2-p          (H)      113   ml/m^2 34 - 74    Max TR roque                 3.52  m/sec    ---------  ESV/bsa, 2-p          (H)      83    ml/m^2 11 - 31  SV/bsa, 2-p                    30.6  ml/m^2 ---------  Ascending aorta            Value          Ref  E', lat tyler, TDI               14.1  cm/sec >=10       AAo AP diam, ED            3.3   cm       2.2 - 3.8  E/e', lat tyler, TDI             4            ---------  AAo AP diam/bsa, ED        1.8   cm/m^2   1.1 - 1.9  E', med  tyler, TDI               13.5  cm/sec >=7  E/e', med tyler, TDI             5            ---------  Pulmonary artery           Value          Ref  E', avg, TDI                   13.8  cm/sec ---------  Pressure, S                58    mm Hg    ---------  E/e', avg, TDI                 4            <=14  Systemic veins             Value          Ref  LVOT                           Value        Ref        Estimated CVP              8     mm Hg    ---------  Diam, S                        2.1   cm     ---------  Area                           3.5   cm^2   ---------    Ventricular septum             Value        Ref  IVS, ED                        0.7   cm     0.6 - 1.0  Legend:  (L)  and  (H)  shay values outside specified reference range.    Prepared and electronically signed by  Shan Mar MD  07/21/2021 13:00       Assessment and Plan  50 year old male presenting with Recent diagnosis of Covid about 2 weeks ago patient was at [facility] and on nasal cannula patient is currently treated with steroids and remdesivir but not sure, HIV not on meds, smoking, alcohol use and polysubstance abuse in the past per father but currently sober, homeless and a  who went to urgent care with complaining of shortness of breath for 1 day.          #Acute hypoxic respiratory failure: Intubated on 7/22 .  Possible secondary to right pneumothorax possible bronchopleural fistula, Covid infection, pneumonia with Pseudomonas bacteremia, hospital-acquired pneumonia with recent hospitalization.  Patient on vancomycin, cefepime, azithromycin.  Lactic acidosis trending down.    Exchanged right chest tube in place with airleak.    Venous Dopplers negative.  CTA chest is negative for PE.  Plan for bronc #Right spontaneous pneumothorax: Denies trauma.  History of HIV not on meds.  CD4 1 and HIV RNA viral load more than 1 million.   CCU following.  Prognosis is poor      #Spontaneous pneumothorax right lung: Exchanged right chest  tube for the leak by intensivist.  Bronchoscopy done on 7/24 sent from cultures..      #Non-STEMI with low ejection fraction of 25%: Dr. Ibanez group is following.  Echocardiogram showed EF of 25%.  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  DC'd IV heparin.  Probable secondary to systemic disease cannot rule out for ischemia need work-up if acute process improves.  Troponin trending down.     #Sepsis  shock and cardiogenic shock with bacteremia with multiorgan failure:  Possible pneumonia, HIV status and Covid.  Covid positive.  Giving IV fluids, PCT is increasing.  EF is 25% .  History of HIV.  Broad-spectrum antibiotics.    DC'd vancomycin, Bactrim.  Continue cefepime and Zithromax, pressors and stress dose of hydrocortisone.  CTA chest showed no PE and infiltrates in the right upper lobe and left lower lobe.  Blood cultures positive for Pseudomonas aeruginosa.  Histoplasma is negative.  PCP negative and Bactrim DC'd.  Repeat blood cultures in process     #Covid infection.recent hospitalization 2 or 3 weeks ago.  Covid positive.  Ferritin is elevated.  ID is following on remdesivir not a candidate for Actemra secondary to active infection with bacteremia.  Continue isolation.     #HIV history: Not on meds.  ID Dr. Morales group is following HIV RNA more than 1 million load and CD4-1.  Started on Truvada.     #Hyponatremia: Possible secondary to pneumonia/HIV.  Sodium improved.  Continue tube feedings.    #Pancytopenia with anemia: Possible secondary to HIV or sepsis.  B12 and folic acid normal.  No active bleeding Hb 6.6.  1 unit of PRBC.     #Transaminitis with elevated ammonia: Possible secondary to sepsis.  States that he quit alcohol 1991.  Ultrasound liver showed fatty liver and no acute cholecystitis.  On lactulose.     #DVT prophylaxis with SCDs.  No chemical anticoagulation secondary thrombocytopenia and anemia      #Smoking: Quit recently.     #Severe protein malnutrition with BMI of around 18 and  Albumin  is 2.4.  Secondary to HIV and other comorbid conditions.    DVT PPX:  Current Active Medications for DVT Prophylaxis (From admission, onward)         Stop     SCD    --                Code Status:    Code Status Information     Code Status    Partial LET           Dispo: Discussed with intensivist, EBONY Radford.  Patient is septic and cardiogenic shock with multiorgan failure with anemia.  Prognosis is poor.  Updated the condition to POA.  1 unit of PRBC.  DC'd Bactrim.      Bobby Ayala MD   7/25/2021

## 2021-07-28 ENCOUNTER — PROCEDURE VISIT (OUTPATIENT)
Dept: UROLOGY | Facility: CLINIC | Age: 41
End: 2021-07-28
Payer: COMMERCIAL

## 2021-07-28 VITALS
BODY MASS INDEX: 27.58 KG/M2 | TEMPERATURE: 98 F | DIASTOLIC BLOOD PRESSURE: 84 MMHG | SYSTOLIC BLOOD PRESSURE: 131 MMHG | HEART RATE: 92 BPM | HEIGHT: 66 IN | WEIGHT: 171.63 LBS | RESPIRATION RATE: 16 BRPM

## 2021-07-28 DIAGNOSIS — Z30.09 VASECTOMY EVALUATION: Primary | ICD-10-CM

## 2021-07-28 PROCEDURE — 55250 REMOVAL OF SPERM DUCT(S): CPT | Mod: S$GLB,,, | Performed by: UROLOGY

## 2021-07-28 PROCEDURE — 55250 PR REMOVAL OF SPERM DUCT(S): ICD-10-PCS | Mod: S$GLB,,, | Performed by: UROLOGY

## 2021-07-28 RX ORDER — LIDOCAINE HYDROCHLORIDE 10 MG/ML
10 INJECTION INFILTRATION; PERINEURAL
Status: COMPLETED | OUTPATIENT
Start: 2021-07-28 | End: 2021-07-28

## 2021-07-28 RX ADMIN — LIDOCAINE HYDROCHLORIDE 10 ML: 10 INJECTION INFILTRATION; PERINEURAL at 02:07

## 2021-08-10 NOTE — PROGRESS NOTES
"..                                                      Physical Therapy Progress Note     Name: Felix DANIEL [facility]  Clinic Number: [id number]  Diagnosis:   Encounter Diagnosis   Name Primary?    Cervicalgia Yes     Physician: Eric Caro MD  Treatment Orders: PT Evaluation and Treatment  No past medical history on file.    Precautions: standard    Precautions: as per diagnosis/?instability C5-6 as per x-ray  Evaluation date: 11/22/2016  Visit # authorized: 10/30  Authorization period: 12-31-16  Plan of care expiration: 1-29-17  MD Follow up appt: none scheduled    Subjective     Pt reports: went fishing on Saturday and then had project of removing An[employer] lights that took all Sunday and Monday felt tired and soreness in arms Sunday and Monday and today feeling good with less arm soreness and no neck pain    Pain Scale: before treatment: 2/10 soreness currently; after treatment: N/T    Objective     Pt continues with slight scapular instability L >R with increased winging and lateral rotation B    FOTO 68 on 1-17-17 previous 63    Therapeutic exercise: Felix received therapeutic exercises to develop BUE strengthening/flexibility and shoulder stabilization for 15 direct minutes      Chin tuck 2 x 10  Retraction with YTB x 10  Retraction with pull down x 10 with YTB  Single ER with blue TB x 20  Corner stretch x 3 planes x 5  Upper trap stretch 1x30"  Levator scap stretch 1x30"   Shoulder shrug with ret x 10    Shoulder extension prone x 10 with 2# 10 with 1#  Horizontal abd prone x 5 with 1# 2/10 with  No weight  Pec stretch on half roll w/ nerve glides x 3 min  Protraction supine with blue theraband x 20 and with 5#    Lower trap Y's 1x10 prone  Shoulder ER side lying w/ scapula retraction x 20 with 1#  Side lying horizontal abduction w/ scapula retraction  (gator) lower trap 2x10 with 1#    Instructed pt in consideration of obtaining posture shirt for increased activities if continue with issues over " Luis Kang is a 58 y.o. female.     F/u for hypercalcemia, hyperlipidemia, osteopenia, vitamin d def, hypertension        Her serum calcium has ranged from 10.7-11.2 with the upper limit of normal of 10.5 mg per DL. She had a bone density done at [facility] in December 2015 which showed osteopenia.  Bone density done in December 2017 showed stable osteopenia compared to 2015.  Serum calcium was at 10.5 mg per DL in February 2021     Bone density done in December 2019 showed osteopenia with a decrease compared to 2017.  FRAX score is 4.2% for major osteoporotic fracture and 0.5% for hip fractures.     She has history of vitamin D deficiency and has been taking vitamin D 2000 units/day.  She has no previous history of kidney stones, peptic ulcer, or depression. She denies muscle weakness.  25 hydroxyvitamin D done in February 2021  is normal at 49.5 ng/mL.      There is no family history of hypercalcemia or nephrolithiasis.     Workup done in January 2017 are as follows: Serum calcium at 11.0 mg per DL.  Intact PTH is normal at 42 pg per mL.  Undetectable PTH RP.  25-hydroxy vitamin D is 28 ng per mL.  24 urine calcium is low at 31.2 mg     Repeat 24-hour urine collection done after vitamin D repletion in 4/18 showed low calcium at 57 mg per 24 hours.      She has history of hypertension and has been on furosemide 20  mg once a day and valsartan 160 mg/day.  Lisinopril was discontinued because of a dry cough.  The cough resolved after she was treated with Prilosec for acid reflux.  No chest pain, shortness of breath or pedal edema.      She has hyperlipidemia and is on Lipitor 20 mg once a day. She denies any myalgia.. She has no history of diabetes mellitus.  She lost 5 pounds since March 2021.  Her last meal was at 2 PM.      She had an episode of transient visual loss on the right eye in July 2016 thought to be due to amaurosis fugax. Carotid ultrasound was normal. She is on aspirin and  "Lipitor.  She has no recurrence of visual loss since.  She had an eye examination in February 12, 2021 and there is no blind spot.     She had colon polyps removed by Dr. Malone in April 2019.  She had colonoscopy in May 2020 and 20 polyps were removed by Dr. Malone.  She had colonoscopy in June 2021 and had multiple polyps removed.  She was advised follow-up colonoscopy in 1 year.  She is adopted and does not know her family history.  Her children are age 40 and younger and have not had a colonoscopy.  She denies bowel complaints.      The following portions of the patient's history were reviewed and updated as appropriate: allergies, current medications, past family history, past medical history, past social history, past surgical history and problem list.    Review of Systems   Respiratory: Negative.  Negative for shortness of breath.    Cardiovascular: Negative for chest pain and palpitations.   Gastrointestinal: Negative.  Negative for constipation and diarrhea.   Endocrine: Negative for cold intolerance and heat intolerance.   Genitourinary: Negative.    Musculoskeletal: Negative for myalgias.   Neurological: Negative for numbness.     Objective      Vitals:    08/16/21 1533   BP: 112/68   BP Location: Right arm   Patient Position: Sitting   Cuff Size: Small Adult   Pulse: 72   SpO2: 94%   Weight: 65 kg (143 lb 5.8 oz)   Height: 162 cm (63.78\")     Physical Exam  Constitutional:       General: She is not in acute distress.     Appearance: Normal appearance. She is obese. She is not ill-appearing or toxic-appearing.   Eyes:      General: No scleral icterus.        Right eye: No discharge.         Left eye: No discharge.      Extraocular Movements: Extraocular movements intact.      Conjunctiva/sclera: Conjunctivae normal.   Neck:      Vascular: No carotid bruit.   Cardiovascular:      Rate and Rhythm: Normal rate and regular rhythm.      Pulses: Normal pulses.      Heart sounds: Normal heart sounds. No " time.    Manual therapy: Felix received the following manual therapy techniques for 10 minutes: STM neck and upper trap and suboccipitals with gentle suboccipital release.  Noted min-mod muscle tightness and tenderness R suboccipitals Instructed pt in use of tennis balls in sock for self STM      Written Home Exercises Provided: none today  Pt demo good understanding of the education provided during the initial evaluation.   Felix demonstrated good return demonstration of activities.     Pt. education:  · Posture reeducation, body mechanics, HEP,activity modification/avoidance  · No spiritual or educational barriers to learning provided  · Pt has no cultural, educational or language barriers to learning provided.    Assessment   Pt with improved FOTO  And tolerated performing increased activities and understands use of posture shirt for increased activity may be something to consider down the road.    Pt will continue to benefit from skilled outpatient physical therapy to address the remaining functional deficits, provide pt/family education, and to maximize pt's level of independence in the home and community environment. .     Anticipated barriers to physical therapy: None    Medical necessity is demonstrated by the following IMPAIRMENTS/PROBLEM LIST:  Decreased range of motion  Decreased strength  Poor posture  Decreased scapular stabilization  Increased pain with prolonged use of UE's in sustained elevated position  Increased pain with carrying 2 year old son  Functional impairment rating of: 61     GOALS:   Short Term Goals: 2 weeks  PARTIALLY ACHIEVED STG'S  Increase range of motion 25%  Increase strength 1/2 muscle grade  Increase postural awareness to normal  Be able to perform HEP with minimal cueing required  Improve functional impairment to 65     Long Term Goals: 4 weeks PARTIALLY ACHIEVED LTG'S  Increase range of motion to 75%to 100% of full  Increase strength 1 muscle grade  Improve scapular  murmur heard.   No friction rub.   Pulmonary:      Effort: Pulmonary effort is normal. No respiratory distress.      Breath sounds: Normal breath sounds. No stridor. No wheezing.   Abdominal:      General: Bowel sounds are normal. There is no distension.      Palpations: Abdomen is soft. There is no mass.      Tenderness: There is no right CVA tenderness or left CVA tenderness.   Musculoskeletal:         General: No swelling or tenderness. Normal range of motion.      Cervical back: Normal range of motion and neck supple. No rigidity or tenderness.   Lymphadenopathy:      Cervical: No cervical adenopathy.   Skin:     General: Skin is warm and dry.      Coloration: Skin is not jaundiced or pale.   Neurological:      General: No focal deficit present.      Mental Status: She is alert and oriented to person, place, and time.       Orders Only on 02/22/2021   Component Date Value Ref Range Status   • WBC 02/25/2021 9.5  3.4 - 10.8 x10E3/uL Final   • RBC 02/25/2021 4.42  3.77 - 5.28 x10E6/uL Final   • Hemoglobin 02/25/2021 13.2  11.1 - 15.9 g/dL Final   • Hematocrit 02/25/2021 40.5  34.0 - 46.6 % Final   • MCV 02/25/2021 92  79 - 97 fL Final   • MCH 02/25/2021 29.9  26.6 - 33.0 pg Final   • MCHC 02/25/2021 32.6  31.5 - 35.7 g/dL Final   • RDW 02/25/2021 12.5  11.7 - 15.4 % Final   • Platelets 02/25/2021 257  150 - 450 x10E3/uL Final   • Neutrophil Rel % 02/25/2021 60  Not Estab. % Final   • Lymphocyte Rel % 02/25/2021 34  Not Estab. % Final   • Monocyte Rel % 02/25/2021 4  Not Estab. % Final   • Eosinophil Rel % 02/25/2021 2  Not Estab. % Final   • Basophil Rel % 02/25/2021 0  Not Estab. % Final   • Neutrophils Absolute 02/25/2021 5.7  1.4 - 7.0 x10E3/uL Final   • Lymphocytes Absolute 02/25/2021 3.2* 0.7 - 3.1 x10E3/uL Final   • Monocytes Absolute 02/25/2021 0.4  0.1 - 0.9 x10E3/uL Final   • Eosinophils Absolute 02/25/2021 0.2  0.0 - 0.4 x10E3/uL Final   • Basophils Absolute 02/25/2021 0.0  0.0 - 0.2 x10E3/uL Final   •  stabilization to normal  Restore ability to lift and carry son without increased pain  Restore ability to use UE's in sustained position without increased pain  Pt to be independent with HEP to improve ROM and independence with ADL's  Improve functional impairment to 71       · Pt's spiritual, cultural and educational needs considered and pt agreeable to plan of care and goals as stated below:        Plan   Continue with established plan of care towards PT goals.     Immature Granulocyte Rel % 02/25/2021 0  Not Estab. % Final   • Immature Grans Absolute 02/25/2021 0.0  0.0 - 0.1 x10E3/uL Final   • Vitamin B-12 02/25/2021 >2000* 232 - 1245 pg/mL Final   • Folate 02/25/2021 >20.0  >3.0 ng/mL Final    Comment: A serum folate concentration of less than 3.1 ng/mL is  considered to represent clinical deficiency.     • Specific Gravity, UA 02/25/2021 1.020  1.005 - 1.030 Final   • pH, UA 02/25/2021 5.5  5.0 - 7.5 Final   • Color, UA 02/25/2021 Yellow  Yellow Final   • Appearance, UA 02/25/2021 Clear  Clear Final   • Leukocytes, UA 02/25/2021 Trace* Negative Final   • Protein 02/25/2021 Negative  Negative/Trace Final   • Glucose, UA 02/25/2021 Negative  Negative Final   • Ketones 02/25/2021 Negative  Negative Final   • Blood, UA 02/25/2021 Negative  Negative Final   • Bilirubin, UA 02/25/2021 Negative  Negative Final   • Urobilinogen, UA 02/25/2021 1.0  0.2 - 1.0 mg/dL Final   • Nitrite, UA 02/25/2021 Negative  Negative Final   • Microscopic Examination 02/25/2021 See below:   Final    Microscopic was indicated and was performed.   • WBC, UA 02/25/2021 0-5  0 - 5 /hpf Final   • RBC, UA 02/25/2021 0-2  0 - 2 /hpf Final   • Epithelial Cells (non renal) 02/25/2021 0-10  0 - 10 /hpf Final   • Mucus, UA 02/25/2021 Present  Not Estab. Final   • Bacteria, UA 02/25/2021 Few  None seen/Few Final     Assessment/Plan   Diagnoses and all orders for this visit:    1. Hypocalciuric hypercalcemia (Primary)  -     Comprehensive Metabolic Panel  -     TSH  -     T4, Free  -     PTH, Intact    2. Essential hypertension  -     Comprehensive Metabolic Panel    3. Mixed hyperlipidemia  -     Comprehensive Metabolic Panel  -     Lipid Panel    4. Vitamin D deficiency  -     Vitamin D 25 Hydroxy    5. Osteopenia of both hips  -     Comprehensive Metabolic Panel  -     Vitamin D 25 Hydroxy  -     TSH  -     T4, Free      Continue vit D 2000 units/day.  Repeat bone density in Dec 2021.  Continue Lipitor 20  mg/day.  Continue valsartan and furosemide.  Flu vaccine this fall    Copy of my note sent to Joanne VARGAS.    Follow-up in 6 months.

## 2021-09-28 ENCOUNTER — TELEPHONE (OUTPATIENT)
Dept: UROLOGY | Facility: CLINIC | Age: 41
End: 2021-09-28

## 2022-01-06 ENCOUNTER — LAB VISIT (OUTPATIENT)
Dept: PRIMARY CARE CLINIC | Facility: CLINIC | Age: 42
End: 2022-01-06
Payer: COMMERCIAL

## 2022-01-06 DIAGNOSIS — Z20.822 CONTACT WITH AND (SUSPECTED) EXPOSURE TO COVID-19: ICD-10-CM

## 2022-01-06 LAB
CTP QC/QA: YES
SARS-COV-2 AG RESP QL IA.RAPID: POSITIVE

## 2022-01-06 PROCEDURE — 87811 SARS-COV-2 COVID19 W/OPTIC: CPT

## 2022-09-25 ENCOUNTER — PATIENT MESSAGE (OUTPATIENT)
Dept: ORTHOPEDICS | Facility: CLINIC | Age: 42
End: 2022-09-25
Payer: COMMERCIAL

## 2022-09-27 ENCOUNTER — HOSPITAL ENCOUNTER (OUTPATIENT)
Dept: RADIOLOGY | Facility: HOSPITAL | Age: 42
Discharge: HOME | End: 2022-09-27
Attending: NURSE PRACTITIONER
Payer: COMMERCIAL

## 2022-09-27 ENCOUNTER — OFFICE VISIT (OUTPATIENT)
Dept: ORTHOPEDICS | Facility: CLINIC | Age: 42
End: 2022-09-27
Payer: COMMERCIAL

## 2022-09-27 VITALS — WEIGHT: 171.5 LBS | BODY MASS INDEX: 27.56 KG/M2 | HEIGHT: 66 IN

## 2022-09-27 DIAGNOSIS — M25.522 LEFT ELBOW PAIN: ICD-10-CM

## 2022-09-27 DIAGNOSIS — M25.522 LEFT ELBOW PAIN: Primary | ICD-10-CM

## 2022-09-27 DIAGNOSIS — M79.602 LEFT ARM PAIN: Primary | ICD-10-CM

## 2022-09-27 PROCEDURE — 73080 XR ELBOW COMPLETE 3 VIEW LEFT: ICD-10-PCS | Mod: 26,LT,, | Performed by: RADIOLOGY

## 2022-09-27 PROCEDURE — 73080 X-RAY EXAM OF ELBOW: CPT | Mod: 26,LT,, | Performed by: RADIOLOGY

## 2022-09-27 PROCEDURE — 1160F RVW MEDS BY RX/DR IN RCRD: CPT | Mod: CPTII,S$GLB,, | Performed by: NURSE PRACTITIONER

## 2022-09-27 PROCEDURE — 99214 PR OFFICE/OUTPT VISIT, EST, LEVL IV, 30-39 MIN: ICD-10-PCS | Mod: S$GLB,,, | Performed by: NURSE PRACTITIONER

## 2022-09-27 PROCEDURE — 3008F BODY MASS INDEX DOCD: CPT | Mod: CPTII,S$GLB,, | Performed by: NURSE PRACTITIONER

## 2022-09-27 PROCEDURE — 99999 PR PBB SHADOW E&M-EST. PATIENT-LVL III: ICD-10-PCS | Mod: PBBFAC,,, | Performed by: NURSE PRACTITIONER

## 2022-09-27 PROCEDURE — 1159F MED LIST DOCD IN RCRD: CPT | Mod: CPTII,S$GLB,, | Performed by: NURSE PRACTITIONER

## 2022-09-27 PROCEDURE — 99214 OFFICE O/P EST MOD 30 MIN: CPT | Mod: S$GLB,,, | Performed by: NURSE PRACTITIONER

## 2022-09-27 PROCEDURE — 1160F PR REVIEW ALL MEDS BY PRESCRIBER/CLIN PHARMACIST DOCUMENTED: ICD-10-PCS | Mod: CPTII,S$GLB,, | Performed by: NURSE PRACTITIONER

## 2022-09-27 PROCEDURE — 99999 PR PBB SHADOW E&M-EST. PATIENT-LVL III: CPT | Mod: PBBFAC,,, | Performed by: NURSE PRACTITIONER

## 2022-09-27 PROCEDURE — 73080 X-RAY EXAM OF ELBOW: CPT | Mod: TC,LT

## 2022-09-27 PROCEDURE — 3008F PR BODY MASS INDEX (BMI) DOCUMENTED: ICD-10-PCS | Mod: CPTII,S$GLB,, | Performed by: NURSE PRACTITIONER

## 2022-09-27 PROCEDURE — 1159F PR MEDICATION LIST DOCUMENTED IN MEDICAL RECORD: ICD-10-PCS | Mod: CPTII,S$GLB,, | Performed by: NURSE PRACTITIONER

## 2022-09-27 NOTE — PROGRESS NOTES
Subjective:      Patient ID: Felix Stern is a 41 y.o. male.    Chief Complaint: Pain of the Left Elbow    Patient is a 41-year-old male who presents to the clinic today for chief complaint of left upper arm pain that started on Saturday.  He states he was trying to crank is both up and felt a pop in his elbow.  Since the incident he is had difficulty performing on curls.  He states his biceps muscle does not stand like it does on the opposite arm.  Denies any numbness or tingling to lower extremity.  He does have pain in his upper arm with certain movements.  No prior history or injuries to the left upper arm.      Review of Systems   Musculoskeletal:         Left upper arm pain   All other systems reviewed and are negative.      Objective:        General    Vitals reviewed.  Constitutional: He is oriented to person, place, and time. He appears well-developed and well-nourished. No distress.   HENT:   Head: Normocephalic and atraumatic.   Eyes: Conjunctivae are normal. Pupils are equal, round, and reactive to light.   Neck: Neck supple.   Cardiovascular:  Intact distal pulses.            Pulmonary/Chest: Effort normal.   Neurological: He is alert and oriented to person, place, and time. He has normal reflexes.   Psychiatric: He has a normal mood and affect. His behavior is normal. Judgment and thought content normal.         LUE:  Patient appears to be neurovascularly intact to the left upper extremity.  Radial pulses 2+.  Hand  is 5/5.  Normal range of motion of the fingers and the wrist.  Limited strength at the elbow.  He is tender to palpation over the biceps region.  No muscle retraction seen in the left upper extremity.    RADS:  Left elbow x-ray was obtained and personally reviewed by me.  No acute fracture seen.        Assessment:       Encounter Diagnosis   Name Primary?    Left arm pain Yes          Plan:       Felix was seen today for pain.    Diagnoses and all orders for this visit:    Left arm  pain  -     MRI Humerus Without Contrast Left; Future    41-year-old male who presents to the clinic today with left upper extremity pain since trying of relapses bolts on Saturday.  He reports he felt a pop and has pain to the arm ever since.  He is right-hand dominant.  X-rays negative for acute fracture.    I will get a MRI to evaluate for biceps tendon rupture.  He was advised should this be the case I will refer him to [facility].

## 2022-09-28 ENCOUNTER — PATIENT MESSAGE (OUTPATIENT)
Dept: ORTHOPEDICS | Facility: CLINIC | Age: 42
End: 2022-09-28
Payer: COMMERCIAL

## 2022-09-28 ENCOUNTER — TELEPHONE (OUTPATIENT)
Dept: ORTHOPEDICS | Facility: CLINIC | Age: 42
End: 2022-09-28
Payer: COMMERCIAL

## 2022-09-28 ENCOUNTER — HOSPITAL ENCOUNTER (OUTPATIENT)
Dept: RADIOLOGY | Facility: HOSPITAL | Age: 42
Discharge: HOME | End: 2022-09-28
Attending: NURSE PRACTITIONER
Payer: COMMERCIAL

## 2022-09-28 DIAGNOSIS — M79.602 LEFT ARM PAIN: ICD-10-CM

## 2022-09-28 PROCEDURE — 73218 MRI HUMERUS WITHOUT CONTRAST LEFT: ICD-10-PCS | Mod: 26,LT,, | Performed by: RADIOLOGY

## 2022-09-28 PROCEDURE — 73218 MRI UPPER EXTREMITY W/O DYE: CPT | Mod: 26,LT,, | Performed by: RADIOLOGY

## 2022-09-28 PROCEDURE — 73218 MRI UPPER EXTREMITY W/O DYE: CPT | Mod: TC,LT

## 2022-09-28 NOTE — TELEPHONE ENCOUNTER
Notified patient MRI indicative of a bicep tear.  Will refer to [facility] for additional treatment options.

## 2022-09-28 NOTE — TELEPHONE ENCOUNTER
Called and informed pt of his appt at [facility] with Dr. Samayoa on 10/03/2022 @ 8 am. Pt was satisfied with appt date/time.

## 2022-10-03 ENCOUNTER — OFFICE VISIT (OUTPATIENT)
Dept: SPORTS MEDICINE | Facility: CLINIC | Age: 42
End: 2022-10-03
Payer: COMMERCIAL

## 2022-10-03 VITALS
DIASTOLIC BLOOD PRESSURE: 95 MMHG | SYSTOLIC BLOOD PRESSURE: 136 MMHG | HEIGHT: 66 IN | BODY MASS INDEX: 27.68 KG/M2 | HEART RATE: 73 BPM

## 2022-10-03 DIAGNOSIS — S46.212A RUPTURE OF DISTAL BICEPS TENDON, LEFT, INITIAL ENCOUNTER: Primary | ICD-10-CM

## 2022-10-03 PROCEDURE — 99999 PR PBB SHADOW E&M-EST. PATIENT-LVL III: ICD-10-PCS | Mod: PBBFAC,,, | Performed by: ORTHOPAEDIC SURGERY

## 2022-10-03 PROCEDURE — 99213 PR OFFICE/OUTPT VISIT, EST, LEVL III, 20-29 MIN: ICD-10-PCS | Mod: S$GLB,,, | Performed by: ORTHOPAEDIC SURGERY

## 2022-10-03 PROCEDURE — 3080F DIAST BP >= 90 MM HG: CPT | Mod: CPTII,S$GLB,, | Performed by: ORTHOPAEDIC SURGERY

## 2022-10-03 PROCEDURE — 99213 OFFICE O/P EST LOW 20 MIN: CPT | Mod: S$GLB,,, | Performed by: ORTHOPAEDIC SURGERY

## 2022-10-03 PROCEDURE — 99999 PR PBB SHADOW E&M-EST. PATIENT-LVL III: CPT | Mod: PBBFAC,,, | Performed by: ORTHOPAEDIC SURGERY

## 2022-10-03 PROCEDURE — 3008F PR BODY MASS INDEX (BMI) DOCUMENTED: ICD-10-PCS | Mod: CPTII,S$GLB,, | Performed by: ORTHOPAEDIC SURGERY

## 2022-10-03 PROCEDURE — 3075F SYST BP GE 130 - 139MM HG: CPT | Mod: CPTII,S$GLB,, | Performed by: ORTHOPAEDIC SURGERY

## 2022-10-03 PROCEDURE — 3080F PR MOST RECENT DIASTOLIC BLOOD PRESSURE >= 90 MM HG: ICD-10-PCS | Mod: CPTII,S$GLB,, | Performed by: ORTHOPAEDIC SURGERY

## 2022-10-03 PROCEDURE — 3075F PR MOST RECENT SYSTOLIC BLOOD PRESS GE 130-139MM HG: ICD-10-PCS | Mod: CPTII,S$GLB,, | Performed by: ORTHOPAEDIC SURGERY

## 2022-10-03 PROCEDURE — 3008F BODY MASS INDEX DOCD: CPT | Mod: CPTII,S$GLB,, | Performed by: ORTHOPAEDIC SURGERY

## 2022-10-03 NOTE — PROGRESS NOTES
CC Left elbow pain. [employer]s [employer], self-referred.    Patient is 41 year-old-male with 9 day history of left elbow pain. Patient was lifting something heavy into his boat when he felt a pop in his left elbow. He had pain and swelling of his left elbow.    He continues to have pain in his left elbow, especially when attempting to lift objects or open doors.  Minimal pain at rest      affecting ADLS    SANE: 80    Review of Systems   Constitution: Negative. Negative for chills, fever and night sweats.   HENT: Negative for congestion and headaches.    Eyes: Negative for blurred vision, left vision loss and right vision loss.   Cardiovascular: Negative for chest pain and syncope.   Respiratory: Negative for cough and shortness of breath.    Endocrine: Negative for polydipsia, polyphagia and polyuria.   Hematologic/Lymphatic: Negative for bleeding problem. Does not bruise/bleed easily.   Skin: Negative for dry skin, itching and rash.   Musculoskeletal: Negative for falls and muscle weakness.   Gastrointestinal: Negative for abdominal pain and bowel incontinence.   Genitourinary: Negative for bladder incontinence and nocturia.   Neurological: Negative for disturbances in coordination, loss of balance and seizures.   Psychiatric/Behavioral: Negative for depression. The patient does not have insomnia.    Allergic/Immunologic: Negative for hives and persistent infections.     PAST MEDICAL HISTORY:   Past Medical History:   Diagnosis Date    GERD (gastroesophageal reflux disease)      PAST SURGICAL HISTORY:   Past Surgical History:   Procedure Laterality Date    HAND SURGERY      VASECTOMY       FAMILY HISTORY:   Family History   Problem Relation Age of Onset    Colon cancer Neg Hx     Esophageal cancer Neg Hx     Stomach cancer Neg Hx     Rectal cancer Neg Hx      SOCIAL HISTORY:   Social History     Socioeconomic History    Marital status:    Tobacco Use    Smoking status: Never    Smokeless tobacco: Never  "  Substance and Sexual Activity    Alcohol use: Yes     Comment: occasionally    Drug use: No       MEDICATIONS:   Current Outpatient Medications:     acyclovir (ZOVIRAX) 400 MG tablet, , Disp: , Rfl:     citalopram (CELEXA) 20 MG tablet, Take 20 mg by mouth once daily., Disp: , Rfl:     famotidine (PEPCID) 10 MG tablet, Take 10 mg by mouth 2 (two) times daily., Disp: , Rfl:     meloxicam (MOBIC) 15 MG tablet, TAKE 1 TABLET BY MOUTH DAILY, Disp: 30 tablet, Rfl: 1    methocarbamol (ROBAXIN) 750 MG Tab, TAKE 1 TABLET BY MOUTH NIGHTLY AS NEEDED FOR SPASMS, Disp: 40 tablet, Rfl: 0    multivitamin (THERAGRAN) per tablet, Take 1 tablet by mouth once daily., Disp: , Rfl:     pantoprazole (PROTONIX) 40 MG tablet, Take 40 mg by mouth once daily., Disp: , Rfl:     ranitidine (ZANTAC) 15 mg/mL syrup, Take by mouth every 12 (twelve) hours., Disp: , Rfl:     VENTOLIN HFA 90 mcg/actuation inhaler, , Disp: , Rfl:     diazePAM (VALIUM) 5 MG tablet, Take 1 tablet (5 mg total) by mouth once. Take all 2-3 pills at once 30-45 minutes prior to procedure. for 1 dose, Disp: 3 tablet, Rfl: 0  ALLERGIES: Review of patient's allergies indicates:  No Known Allergies    VITAL SIGNS: BP (!) 136/95   Pulse 73   Ht 5' 6" (1.676 m)   BMI 27.68 kg/m²        PHYSICAL EXAM:  General: Patient appears alert and oriented x 3.  Mood is pleasant.  Observation of ears, eyes and nose reveal no gross abnormalities. HEENT: NCAT, sclera nonicteric  Lungs: Respirations are equal and unlabored.  Well nourished, in no acute distress and ambulates with a non-antalgic gait with no assistive devices.    Skin: Skin intact bilaterally. Sensation intact bilaterally. Compartments soft. No evidence of edema, infection, or induration.     Left ELBOW / WRIST EXTREMITY EXAM:    OBSERVATION / INSPECTION    Swelling  none    Deformity  none  Discoloration  none     Scars   none    Atrophy  + atrophy of distal biceps    TENDERNESS / CREPITUS (T / C):           T / " C        Medial epicondyle   - / -    Med. (Ulnar) collateral ligament  - / -    Flexor pronator Musculature   - / -   Biceps tendon    + / -   Head of radius    - / -    Lateral epicondyle   - / -    Extensor Musculature   - / -   Brachioradialis   - / -   Triceps tendon   - / -   Triceps muscle   - / -   Olecranon    - / -   Olecranon bursa   - / -   Cubital fossa    - / -   Anterior jointline   - / -   Radial tunnel    -/ -             ROM: ('*' = with pain)    Right Elbow  AROM (PROM)     Extension   0 deg  (5 deg)   Flexion   145 deg (145 deg)         Pronation  90 deg  (90 deg)      Supination   80 deg  (80 deg)                 Left Elbow  AROM (PROM)     Extension   0 deg  (5 deg)   Flexion   145 deg (145 deg)         Pronation  90 deg  (90 deg)      Supination   80 deg  (80 deg)            Right Wrist  AROM (PROM)   Extension   80 deg (85 deg)   Flexion   80 deg (85 deg)         Ulnar Deviation   35 deg (40 deg)  Radial Deviation 35 deg (40 deg)             Left Wrist   AROM (PROM)     Extension   80 deg (85 deg)   Flexion   80 deg (85 deg)         Ulnar Deviation   35 deg (40 deg)  Radial Deviation 35 deg (40 deg)        STRENGTH: ('*' = with pain)    Elbow Flexion:   5-/5  Elbow Extension:  5/5  Wrist Flexion:   5/5  Wrist Extension:  5/5  :    5/5  Intrinsics:   5/5  EPL (Ext. pollicis longus): 5/5  Pinch Mechanism:  5/5    ELBOW EXAMINATION:  See above noted areas of tenderness.   Test for Ligamentous Instability - UCL normal  Test for Ligamentous Instability - LUCL normal  PLRI       neg  Tinel's (Percussion) Test - Cubital  neg  Tennis Elbow Test    neg  Golfer's Elbow Test    neg  Radial Capitellar Grind Test   neg  Valgus/Extension Overload Test  neg  Resisted Long Finger Extension Pain neg  Moving Valgus    neg  Forearm pain with resisted supination neg  Yeargeson' s (elbow pain)   neg  Hook test     +, some tendon present but diminished compared to contralateral    WRIST EXAMINATION:  See above  noted areas of tenderness.   Finkelstein's Test   neg  Tinel's Test - Carpal Tunnel  neg  Phalen's Test    neg  Median Nerve Compression Test neg  Ulnar-sided Compression Test neg  LT Ballottment Test   neg  Snuff box tenderness   neg  Oneill's Test   neg  LT Instability    neg  Hook of Hamate Tenderness  neg     EXTREMITY NEURO-VASCULAR EXAMINATION: Sensation grossly intact to light touch all dermatomal regions. DTR 2+ Biceps, Triceps, BR and Negative John's sign. Grossly intact motor function at Elbow, Wrist and Hand. Distal pulses radial and ulnar 2+, brisk cap refill, symmetric.    Other Findings:      Xrays: (AP, lateral, oblique) Left elbow ordered and reviewed by me personally today: no evidence of fracture or dislocation.  Osseous structures appear intact.    MRI:  MRI of left humerus shows suspected distal biceps tendon rupture. MRI does not extend distal enough for adequate evaluation of distal biceps tendon.        ASSESSMENT:  Left elbow pain, distal biceps tendon rupture      PLAN:  MRI of left elbow to evaluate distal biceps tendon  We will follow up with patient regarding MRI results

## 2022-10-19 ENCOUNTER — HOSPITAL ENCOUNTER (OUTPATIENT)
Dept: RADIOLOGY | Facility: HOSPITAL | Age: 42
Discharge: HOME | End: 2022-10-19
Attending: STUDENT IN AN ORGANIZED HEALTH CARE EDUCATION/TRAINING PROGRAM
Payer: COMMERCIAL

## 2022-10-19 DIAGNOSIS — S46.212A RUPTURE OF DISTAL BICEPS TENDON, LEFT, INITIAL ENCOUNTER: ICD-10-CM

## 2022-10-19 PROCEDURE — 73221 MRI JOINT UPR EXTREM W/O DYE: CPT | Mod: 26,LT,, | Performed by: RADIOLOGY

## 2022-10-19 PROCEDURE — 73221 MRI JOINT UPR EXTREM W/O DYE: CPT | Mod: TC,LT

## 2022-10-19 PROCEDURE — 73221 MRI ELBOW WITHOUT CONTRAST LEFT: ICD-10-PCS | Mod: 26,LT,, | Performed by: RADIOLOGY

## 2022-10-21 ENCOUNTER — OFFICE VISIT (OUTPATIENT)
Dept: SPORTS MEDICINE | Facility: CLINIC | Age: 42
End: 2022-10-21
Payer: COMMERCIAL

## 2022-10-21 ENCOUNTER — PATIENT MESSAGE (OUTPATIENT)
Dept: SPORTS MEDICINE | Facility: CLINIC | Age: 42
End: 2022-10-21

## 2022-10-21 ENCOUNTER — PATIENT MESSAGE (OUTPATIENT)
Dept: PREADMISSION TESTING | Facility: HOSPITAL | Age: 42
End: 2022-10-21
Payer: COMMERCIAL

## 2022-10-21 VITALS
WEIGHT: 171 LBS | BODY MASS INDEX: 27.48 KG/M2 | HEART RATE: 111 BPM | DIASTOLIC BLOOD PRESSURE: 92 MMHG | SYSTOLIC BLOOD PRESSURE: 151 MMHG | HEIGHT: 66 IN

## 2022-10-21 DIAGNOSIS — S46.212A RUPTURE OF LEFT DISTAL BICEPS TENDON, INITIAL ENCOUNTER: Primary | ICD-10-CM

## 2022-10-21 DIAGNOSIS — G89.18 POST-OPERATIVE PAIN: ICD-10-CM

## 2022-10-21 DIAGNOSIS — S46.212A RUPTURE OF DISTAL BICEPS TENDON, LEFT, INITIAL ENCOUNTER: Primary | ICD-10-CM

## 2022-10-21 PROCEDURE — 1159F MED LIST DOCD IN RCRD: CPT | Mod: CPTII,S$GLB,, | Performed by: PHYSICIAN ASSISTANT

## 2022-10-21 PROCEDURE — 99499 UNLISTED E&M SERVICE: CPT | Mod: S$GLB,,, | Performed by: PHYSICIAN ASSISTANT

## 2022-10-21 PROCEDURE — 1160F PR REVIEW ALL MEDS BY PRESCRIBER/CLIN PHARMACIST DOCUMENTED: ICD-10-PCS | Mod: CPTII,S$GLB,, | Performed by: PHYSICIAN ASSISTANT

## 2022-10-21 PROCEDURE — 99999 PR PBB SHADOW E&M-EST. PATIENT-LVL IV: ICD-10-PCS | Mod: PBBFAC,,, | Performed by: PHYSICIAN ASSISTANT

## 2022-10-21 PROCEDURE — 3077F PR MOST RECENT SYSTOLIC BLOOD PRESSURE >= 140 MM HG: ICD-10-PCS | Mod: CPTII,S$GLB,, | Performed by: PHYSICIAN ASSISTANT

## 2022-10-21 PROCEDURE — 1159F PR MEDICATION LIST DOCUMENTED IN MEDICAL RECORD: ICD-10-PCS | Mod: CPTII,S$GLB,, | Performed by: PHYSICIAN ASSISTANT

## 2022-10-21 PROCEDURE — 3077F SYST BP >= 140 MM HG: CPT | Mod: CPTII,S$GLB,, | Performed by: PHYSICIAN ASSISTANT

## 2022-10-21 PROCEDURE — 99999 PR PBB SHADOW E&M-EST. PATIENT-LVL IV: CPT | Mod: PBBFAC,,, | Performed by: PHYSICIAN ASSISTANT

## 2022-10-21 PROCEDURE — 1160F RVW MEDS BY RX/DR IN RCRD: CPT | Mod: CPTII,S$GLB,, | Performed by: PHYSICIAN ASSISTANT

## 2022-10-21 PROCEDURE — 3080F DIAST BP >= 90 MM HG: CPT | Mod: CPTII,S$GLB,, | Performed by: PHYSICIAN ASSISTANT

## 2022-10-21 PROCEDURE — 99499 NO LOS: ICD-10-PCS | Mod: S$GLB,,, | Performed by: PHYSICIAN ASSISTANT

## 2022-10-21 PROCEDURE — 3080F PR MOST RECENT DIASTOLIC BLOOD PRESSURE >= 90 MM HG: ICD-10-PCS | Mod: CPTII,S$GLB,, | Performed by: PHYSICIAN ASSISTANT

## 2022-10-21 NOTE — ANESTHESIA PAT ROS NOTE
10/21/2022  Felix Stern is a 41 y.o., male.      Pre-op Assessment    I have reviewed the Patient Summary Reports.       I have reviewed the Medications.     Review of Systems  Anesthesia Hx:             Denies Family Hx of Anesthesia complications.        Past Medical History:   Diagnosis Date    GERD (gastroesophageal reflux disease)        Past Surgical History:   Procedure Laterality Date    HAND SURGERY      VASECTOMY          Planned Procedure: Procedure(s) (LRB):  REPAIR,TENDON,DISTAL PROXIMAL (Left)  Requested Anesthesia Type:General  Surgeon: Jossy Samayoa MD  Service: Orthopedics  Known or anticipated Date of Surgery:10/25/2022    Surgeon notes: reviewed       Triage considerations:     The patient has no apparent active cardiac condition (No unstable coronary Syndrome such as severe unstable angina or recent [<1 month] myocardial infarction, decompensated CHF, severe valvular   disease or significant arrhythmia)    Previous anesthesia records:Not available    Last PCP note:  None noted on chart review     Tests already available:  No recent tests.             Instructions given. (See in Nurse's note)    Ht:  5'6  Wt:  171 lb  BMI:  27.68    Not vaccinated

## 2022-10-24 RX ORDER — SODIUM CHLORIDE 9 MG/ML
INJECTION, SOLUTION INTRAVENOUS CONTINUOUS
Status: CANCELLED | OUTPATIENT
Start: 2022-10-24

## 2022-10-24 RX ORDER — PREGABALIN 75 MG/1
75 CAPSULE ORAL
Status: CANCELLED | OUTPATIENT
Start: 2022-10-24 | End: 2022-10-24

## 2022-10-24 RX ORDER — OXYCODONE HCL 10 MG/1
10 TABLET, FILM COATED, EXTENDED RELEASE ORAL
Status: CANCELLED | OUTPATIENT
Start: 2022-10-24 | End: 2022-10-24

## 2022-10-24 RX ORDER — PROMETHAZINE HYDROCHLORIDE 25 MG/1
25 TABLET ORAL EVERY 6 HOURS PRN
Qty: 8 TABLET | Refills: 0 | Status: SHIPPED | OUTPATIENT
Start: 2022-10-24 | End: 2022-12-01

## 2022-10-24 RX ORDER — OXYCODONE AND ACETAMINOPHEN 10; 325 MG/1; MG/1
TABLET ORAL
Qty: 15 TABLET | Refills: 0 | Status: SHIPPED | OUTPATIENT
Start: 2022-10-24 | End: 2022-11-13 | Stop reason: SDUPTHER

## 2022-10-24 RX ORDER — NAPROXEN 500 MG/1
500 TABLET ORAL EVERY 12 HOURS
Qty: 42 TABLET | Refills: 0 | Status: SHIPPED | OUTPATIENT
Start: 2022-10-24 | End: 2022-11-14

## 2022-10-24 RX ORDER — CLINDAMYCIN PHOSPHATE 900 MG/50ML
900 INJECTION, SOLUTION INTRAVENOUS
Status: CANCELLED | OUTPATIENT
Start: 2022-10-24

## 2022-10-24 RX ORDER — INDOMETHACIN 25 MG/1
75 CAPSULE ORAL DAILY
Qty: 12 CAPSULE | Refills: 0 | Status: SHIPPED | OUTPATIENT
Start: 2022-10-24 | End: 2022-12-01 | Stop reason: ALTCHOICE

## 2022-10-24 RX ORDER — TRAMADOL HYDROCHLORIDE 50 MG/1
50-100 TABLET ORAL EVERY 6 HOURS PRN
Qty: 21 TABLET | Refills: 0 | Status: SHIPPED | OUTPATIENT
Start: 2022-10-24 | End: 2022-11-13 | Stop reason: SDUPTHER

## 2022-10-24 RX ORDER — ASPIRIN 81 MG/1
81 TABLET ORAL DAILY
Qty: 28 TABLET | Refills: 0 | COMMUNITY
Start: 2022-10-24 | End: 2022-11-01 | Stop reason: SDUPTHER

## 2022-10-24 NOTE — H&P (VIEW-ONLY)
Felix Stern  is here for a completion of his perioperative paperwork. he  Is scheduled to undergo     left  a. Distal biceps tendon reconstruction on 11/1/22 (pushed back due to dog bite on surgical arm).     He is a healthy individual but does need clearance for this procedure which he has received from internal medicine clinic.     PAST MEDICAL HISTORY:   Past Medical History:   Diagnosis Date    GERD (gastroesophageal reflux disease)      PAST SURGICAL HISTORY:   Past Surgical History:   Procedure Laterality Date    HAND SURGERY      VASECTOMY       FAMILY HISTORY:   Family History   Problem Relation Age of Onset    Colon cancer Neg Hx     Esophageal cancer Neg Hx     Stomach cancer Neg Hx     Rectal cancer Neg Hx      SOCIAL HISTORY:   Social History     Socioeconomic History    Marital status:    Tobacco Use    Smoking status: Never    Smokeless tobacco: Never   Substance and Sexual Activity    Alcohol use: Yes     Comment: occasionally    Drug use: No       MEDICATIONS:   Current Outpatient Medications:     acyclovir (ZOVIRAX) 400 MG tablet, , Disp: , Rfl:     aspirin (ECOTRIN) 81 MG EC tablet, Take 1 tablet (81 mg total) by mouth once daily. For 4 weeks starting the day after surgery., Disp: 28 tablet, Rfl: 0    citalopram (CELEXA) 20 MG tablet, Take 20 mg by mouth once daily., Disp: , Rfl:     diazePAM (VALIUM) 5 MG tablet, Take 1 tablet (5 mg total) by mouth once. Take all 2-3 pills at once 30-45 minutes prior to procedure. for 1 dose, Disp: 3 tablet, Rfl: 0    famotidine (PEPCID) 10 MG tablet, Take 10 mg by mouth 2 (two) times daily., Disp: , Rfl:     indomethacin (INDOCIN) 25 MG capsule, Take 3 capsules (75 mg total) by mouth once daily. Take with food. Take on post-op days 1,2,3,&4., Disp: 12 capsule, Rfl: 0    meloxicam (MOBIC) 15 MG tablet, TAKE 1 TABLET BY MOUTH DAILY, Disp: 30 tablet, Rfl: 1    methocarbamol (ROBAXIN) 750 MG Tab, TAKE 1 TABLET BY MOUTH NIGHTLY AS NEEDED FOR SPASMS, Disp:  "40 tablet, Rfl: 0    multivitamin (THERAGRAN) per tablet, Take 1 tablet by mouth once daily., Disp: , Rfl:     naproxen (NAPROSYN) 500 MG tablet, Take 1 tablet (500 mg total) by mouth every 12 (twelve) hours. Take with food. Starting on post-op day 5 for 21 days, Disp: 42 tablet, Rfl: 0    oxyCODONE-acetaminophen (PERCOCET)  mg per tablet, Take 1 tablet by mouth every 4-6 hours as needed for pain. Take stool softener with this medication., Disp: 15 tablet, Rfl: 0    pantoprazole (PROTONIX) 40 MG tablet, Take 40 mg by mouth once daily., Disp: , Rfl:     promethazine (PHENERGAN) 25 MG tablet, Take 1 tablet (25 mg total) by mouth every 6 (six) hours as needed for Nausea., Disp: 8 tablet, Rfl: 0    ranitidine (ZANTAC) 15 mg/mL syrup, Take by mouth every 12 (twelve) hours., Disp: , Rfl:     traMADoL (ULTRAM) 50 mg tablet, Take 1-2 tablets ( mg total) by mouth every 6 (six) hours as needed for Pain., Disp: 21 tablet, Rfl: 0    VENTOLIN HFA 90 mcg/actuation inhaler, , Disp: , Rfl:   ALLERGIES: Review of patient's allergies indicates:  No Known Allergies    VITAL SIGNS: BP (!) 151/92   Pulse (!) 111   Ht 5' 6" (1.676 m)   Wt 77.6 kg (171 lb)   BMI 27.60 kg/m²      Risks, indications and benefits of the surgical procedure were discussed with the patient. All questions with regard to surgery, rehab, expected return to functional activities, activities of daily living and recreational endeavors were answered to his satisfaction.    It was explained to the patient that there may be an increase in surgical risks if the patient has certain co-morbidities such as but not limited to: Obesity, Cardiovascular issues (CHF, CAD, Arrhythmias), chronic pulmonary issues, previous or current neurovascular/neurological issues, previous strokes, diabetes mellitus, previous wound healing issues, previous wound or skin infections, PVD, clotting disorders, if the patient uses chronic steroids, if the patient takes or has immune " compromising medications or diseases, or has previously or currently used tobacco products.     The patient verbalized that he/she does not have any additional clotting, bleeding, or blood disorders, other than what is list in her chart on today's review.     Then a brief history and physical exam were performed.    Review of Systems   Constitution: Negative. Negative for chills, fever and night sweats.   HENT: Negative for congestion and headaches.    Eyes: Negative for blurred vision, left vision loss and right vision loss.   Cardiovascular: Negative for chest pain and syncope.   Respiratory: Negative for cough and shortness of breath.    Endocrine: Negative for polydipsia, polyphagia and polyuria.   Hematologic/Lymphatic: Negative for bleeding problem. Does not bruise/bleed easily.   Skin: Negative for dry skin, itching and rash.   Musculoskeletal: Negative for falls and muscle weakness.   Gastrointestinal: Negative for abdominal pain and bowel incontinence.   Genitourinary: Negative for bladder incontinence and nocturia.   Neurological: Negative for disturbances in coordination, loss of balance and seizures.   Psychiatric/Behavioral: Negative for depression. The patient does not have insomnia.    Allergic/Immunologic: Negative for hives and persistent infections.     PHYSICAL EXAM:  GEN: A&Ox3, WD WN NAD  HEENT: WNL  CHEST: CTAB, no W/R/R  HEART: RRR, no M/R/G  ABD: Soft, NT ND, BS x4 QUADS  MS; See Epic  NEURO: CN II-XII intact       The surgical consent was then reviewed with the patient, who agreed with all the contents of the consent form and it was signed. he was then given the surgery packet to bring with him to [facility] for the anesthesia portion of his perioperative paperwork (if needed)   For all physicians except for Dr. Grove, we will email and possibly fax the consent forms and booking sheets to [facility].    The patient was given the opportunity to ask questions about the  surgical plan and consent form, and once no other questions were asked, I proceeded with the pre-op appointment.    PHYSICAL THERAPY:  He was also instructed regarding physical therapy and will begin on  POD10. He was given a copy of the original prescription to schedule. Another copy of this prescription was also faxed to [facility].    POST OP CARE:instructions were reviewed including care of the wound and dressing after surgery and when he can shower. Patient was told not sleep or lay on there surgical extremity following surgery as this could cause repair damage, tissue damage, or nerve injury.    An extensive amount of time was spent on discussion of the following information based on what type of surgery the patient was having. Patient expressed understanding of the material below:    Shoulder surgery or upper extremity surgery requiring post-op sling:  It was explained to the patient that they should remove their arm from the sling approximately 6 times per day to do full elbow ROM (flexion and extension) and full supination and pronation of the elbow for approximately 5 minutes at a time to help prevent elbow stiffness, nerve pain or problems, or nerve injury. They were told to contact us if they begin having numbness and tingling of there surgical extremity that persists longer then 1 day without relief.     Extremity surgery requiring a splint:   It was explained to patient on how to properly elevated position there extremity to prevent pressure ulcers from occurring. I made sure that the patient understood that that surgical site may be numb following surgery and prevent them from feeling pressure pain that they would normal feel if a pressure injury was occurring. Pressure ulcers and there causes were discussed with the patient today.     Post-operative splint:  It was explain to the patient that they can contact us at anytime if they feel that there is a problem with their splint or under their splint  that needs evaluation. If there is concern, questions, or discomfort with the splint then they can present to either our clinic or the [facility] ED for removal, evaluation, and replacement of the splint.    CRUTCHES OR WALKER: It was explained to the patient that if they are having a lower extremity surgery that they will require either a walker or crutches to ambulate safely with after surgery. It was explained that a cane or other assistive devices are not sufficient to safely ambulate with after surgery. I explained to the patient that I will place an order for them to receive either crutches or a walker after surgery to go home with. It was explained that if they have crutches or a walker at home already, that they are REQUIRED to bring them to the hospital on the day of surgery. It was explained that if they do not have them at the hospital on the day of surgery that they WILL be provided a new pair or crutches or a walker to go home with to ensure ambulation will be safe if the patient needs to stop somewhere on the way home.      PAIN MANAGEMENT: Felix Stern was also given a pain management regime, which includes the option of getting a TENS unit given to him by [facility] along with the education required for its use. He was also instructed regarding the Polar ice unit or gel ice packs that will be in place after surgery and his postoperative pain medications.     PAIN MEDICATION:  Percocet 10/325mg 1 po q 4-6 hours prn pain  Ultram 50 mg Take 1-2 p.o. q.6 hours p.r.n. breakthrough pain,   Phenergan 25 mg one p.o. q.6 hours p.r.n. nausea and vomiting.    Indocin 75mg po x 4 days  Naproxen 500mg po BID x 21 days (starting on POD5)  Prilosec 40mg x 25 days for stomach protection  He denies history of stomach ulcers. Only GERD. Also takes PPI already.    DVT prophylaxis was discussed with the patient today including risk factors for developing DVTs and history of DVTs. The patient was asked if  any specific recommendations were given from the doctor/s that did pre-operative surgical clearance. The patient was then given an education sheet about DVTs and PE with warning signs and symptoms of both and steps to take if they suspect either of these.    This along with the Modified Caprini risk assessment model for VTE in general surgical patients was used to determine the patient's DVT risk.     From: Laure VILLALTA, Olman DA, Lou SM, et al. Prevention of VTE in nonorthopedic surgical patients: antithrombotic therapy and prevention of thrombosis, 9th ed: American College of [employer] evidence-based clinical practical guidelines. Chest 2012; 141:e227S. Copyright © 2012. Reproduced with permission from the American College of Chest Physicians.    The below listed DVT prophylaxis regimen along with bilateral ABRAHAM compression stockings will be used post-op. Length of treatment has been determined to be 10-42 days post-op by the above noted Caprini assessment model.     The patient was instructed to buy and take:  Aspirin 81mg QD x 4 weeks for DVT prophylaxis starting on the morning after surgery.  Patient will also use bilateral TEDs on lower extremities, SCDs during surgery, and early ambulation post-op. If the patient was previously taking 81mg baby aspirin, they were told to not take it starting 5 days prior to surgery and to restart the 81mg aspirin after surgery.       Patient was also told to buy over the counter Prilosec medication if needed and take it once daily for GI protection as long as they are taking NSAIDs or Aspirin.   Patient denies history of seizures.      The patient was told that narcotic pain medications may make them drowsy and instructions were given to not sign legal documents, drive or operate heavy machinery, cars, or equipment while under the influence of narcotic medications. The patient was told and understands that narcotic pain medications should only be used as needed to control  pain and that other options of pain control include TENs unit and ice packs/unit.     As there were no other questions to be asked, he was given my business card along with Jossy Samayoa MD business card if he has any questions or concerns prior to surgery or in the postop period.

## 2022-10-24 NOTE — H&P
Felix Stern  is here for a completion of his perioperative paperwork. he  Is scheduled to undergo     left  a. Distal biceps tendon reconstruction on 11/1/22 (pushed back due to dog bite on surgical arm).     He is a healthy individual but does need clearance for this procedure which he has received from internal medicine clinic.     PAST MEDICAL HISTORY:   Past Medical History:   Diagnosis Date    GERD (gastroesophageal reflux disease)      PAST SURGICAL HISTORY:   Past Surgical History:   Procedure Laterality Date    HAND SURGERY      VASECTOMY       FAMILY HISTORY:   Family History   Problem Relation Age of Onset    Colon cancer Neg Hx     Esophageal cancer Neg Hx     Stomach cancer Neg Hx     Rectal cancer Neg Hx      SOCIAL HISTORY:   Social History     Socioeconomic History    Marital status:    Tobacco Use    Smoking status: Never    Smokeless tobacco: Never   Substance and Sexual Activity    Alcohol use: Yes     Comment: occasionally    Drug use: No       MEDICATIONS:   Current Outpatient Medications:     acyclovir (ZOVIRAX) 400 MG tablet, , Disp: , Rfl:     aspirin (ECOTRIN) 81 MG EC tablet, Take 1 tablet (81 mg total) by mouth once daily. For 4 weeks starting the day after surgery., Disp: 28 tablet, Rfl: 0    citalopram (CELEXA) 20 MG tablet, Take 20 mg by mouth once daily., Disp: , Rfl:     diazePAM (VALIUM) 5 MG tablet, Take 1 tablet (5 mg total) by mouth once. Take all 2-3 pills at once 30-45 minutes prior to procedure. for 1 dose, Disp: 3 tablet, Rfl: 0    famotidine (PEPCID) 10 MG tablet, Take 10 mg by mouth 2 (two) times daily., Disp: , Rfl:     indomethacin (INDOCIN) 25 MG capsule, Take 3 capsules (75 mg total) by mouth once daily. Take with food. Take on post-op days 1,2,3,&4., Disp: 12 capsule, Rfl: 0    meloxicam (MOBIC) 15 MG tablet, TAKE 1 TABLET BY MOUTH DAILY, Disp: 30 tablet, Rfl: 1    methocarbamol (ROBAXIN) 750 MG Tab, TAKE 1 TABLET BY MOUTH NIGHTLY AS NEEDED FOR SPASMS, Disp:  "40 tablet, Rfl: 0    multivitamin (THERAGRAN) per tablet, Take 1 tablet by mouth once daily., Disp: , Rfl:     naproxen (NAPROSYN) 500 MG tablet, Take 1 tablet (500 mg total) by mouth every 12 (twelve) hours. Take with food. Starting on post-op day 5 for 21 days, Disp: 42 tablet, Rfl: 0    oxyCODONE-acetaminophen (PERCOCET)  mg per tablet, Take 1 tablet by mouth every 4-6 hours as needed for pain. Take stool softener with this medication., Disp: 15 tablet, Rfl: 0    pantoprazole (PROTONIX) 40 MG tablet, Take 40 mg by mouth once daily., Disp: , Rfl:     promethazine (PHENERGAN) 25 MG tablet, Take 1 tablet (25 mg total) by mouth every 6 (six) hours as needed for Nausea., Disp: 8 tablet, Rfl: 0    ranitidine (ZANTAC) 15 mg/mL syrup, Take by mouth every 12 (twelve) hours., Disp: , Rfl:     traMADoL (ULTRAM) 50 mg tablet, Take 1-2 tablets ( mg total) by mouth every 6 (six) hours as needed for Pain., Disp: 21 tablet, Rfl: 0    VENTOLIN HFA 90 mcg/actuation inhaler, , Disp: , Rfl:   ALLERGIES: Review of patient's allergies indicates:  No Known Allergies    VITAL SIGNS: BP (!) 151/92   Pulse (!) 111   Ht 5' 6" (1.676 m)   Wt 77.6 kg (171 lb)   BMI 27.60 kg/m²      Risks, indications and benefits of the surgical procedure were discussed with the patient. All questions with regard to surgery, rehab, expected return to functional activities, activities of daily living and recreational endeavors were answered to his satisfaction.    It was explained to the patient that there may be an increase in surgical risks if the patient has certain co-morbidities such as but not limited to: Obesity, Cardiovascular issues (CHF, CAD, Arrhythmias), chronic pulmonary issues, previous or current neurovascular/neurological issues, previous strokes, diabetes mellitus, previous wound healing issues, previous wound or skin infections, PVD, clotting disorders, if the patient uses chronic steroids, if the patient takes or has immune " compromising medications or diseases, or has previously or currently used tobacco products.     The patient verbalized that he/she does not have any additional clotting, bleeding, or blood disorders, other than what is list in her chart on today's review.     Then a brief history and physical exam were performed.    Review of Systems   Constitution: Negative. Negative for chills, fever and night sweats.   HENT: Negative for congestion and headaches.    Eyes: Negative for blurred vision, left vision loss and right vision loss.   Cardiovascular: Negative for chest pain and syncope.   Respiratory: Negative for cough and shortness of breath.    Endocrine: Negative for polydipsia, polyphagia and polyuria.   Hematologic/Lymphatic: Negative for bleeding problem. Does not bruise/bleed easily.   Skin: Negative for dry skin, itching and rash.   Musculoskeletal: Negative for falls and muscle weakness.   Gastrointestinal: Negative for abdominal pain and bowel incontinence.   Genitourinary: Negative for bladder incontinence and nocturia.   Neurological: Negative for disturbances in coordination, loss of balance and seizures.   Psychiatric/Behavioral: Negative for depression. The patient does not have insomnia.    Allergic/Immunologic: Negative for hives and persistent infections.     PHYSICAL EXAM:  GEN: A&Ox3, WD WN NAD  HEENT: WNL  CHEST: CTAB, no W/R/R  HEART: RRR, no M/R/G  ABD: Soft, NT ND, BS x4 QUADS  MS; See Epic  NEURO: CN II-XII intact       The surgical consent was then reviewed with the patient, who agreed with all the contents of the consent form and it was signed. he was then given the surgery packet to bring with him to [facility] for the anesthesia portion of his perioperative paperwork (if needed)   For all physicians except for Dr. Grove, we will email and possibly fax the consent forms and booking sheets to [facility].    The patient was given the opportunity to ask questions about the  surgical plan and consent form, and once no other questions were asked, I proceeded with the pre-op appointment.    PHYSICAL THERAPY:  He was also instructed regarding physical therapy and will begin on  POD10. He was given a copy of the original prescription to schedule. Another copy of this prescription was also faxed to [facility].    POST OP CARE:instructions were reviewed including care of the wound and dressing after surgery and when he can shower. Patient was told not sleep or lay on there surgical extremity following surgery as this could cause repair damage, tissue damage, or nerve injury.    An extensive amount of time was spent on discussion of the following information based on what type of surgery the patient was having. Patient expressed understanding of the material below:    Shoulder surgery or upper extremity surgery requiring post-op sling:  It was explained to the patient that they should remove their arm from the sling approximately 6 times per day to do full elbow ROM (flexion and extension) and full supination and pronation of the elbow for approximately 5 minutes at a time to help prevent elbow stiffness, nerve pain or problems, or nerve injury. They were told to contact us if they begin having numbness and tingling of there surgical extremity that persists longer then 1 day without relief.     Extremity surgery requiring a splint:   It was explained to patient on how to properly elevated position there extremity to prevent pressure ulcers from occurring. I made sure that the patient understood that that surgical site may be numb following surgery and prevent them from feeling pressure pain that they would normal feel if a pressure injury was occurring. Pressure ulcers and there causes were discussed with the patient today.     Post-operative splint:  It was explain to the patient that they can contact us at anytime if they feel that there is a problem with their splint or under their splint  that needs evaluation. If there is concern, questions, or discomfort with the splint then they can present to either our clinic or the [facility] ED for removal, evaluation, and replacement of the splint.    CRUTCHES OR WALKER: It was explained to the patient that if they are having a lower extremity surgery that they will require either a walker or crutches to ambulate safely with after surgery. It was explained that a cane or other assistive devices are not sufficient to safely ambulate with after surgery. I explained to the patient that I will place an order for them to receive either crutches or a walker after surgery to go home with. It was explained that if they have crutches or a walker at home already, that they are REQUIRED to bring them to the hospital on the day of surgery. It was explained that if they do not have them at the hospital on the day of surgery that they WILL be provided a new pair or crutches or a walker to go home with to ensure ambulation will be safe if the patient needs to stop somewhere on the way home.      PAIN MANAGEMENT: Felix Stern was also given a pain management regime, which includes the option of getting a TENS unit given to him by [facility] along with the education required for its use. He was also instructed regarding the Polar ice unit or gel ice packs that will be in place after surgery and his postoperative pain medications.     PAIN MEDICATION:  Percocet 10/325mg 1 po q 4-6 hours prn pain  Ultram 50 mg Take 1-2 p.o. q.6 hours p.r.n. breakthrough pain,   Phenergan 25 mg one p.o. q.6 hours p.r.n. nausea and vomiting.    Indocin 75mg po x 4 days  Naproxen 500mg po BID x 21 days (starting on POD5)  Prilosec 40mg x 25 days for stomach protection  He denies history of stomach ulcers. Only GERD. Also takes PPI already.    DVT prophylaxis was discussed with the patient today including risk factors for developing DVTs and history of DVTs. The patient was asked if  any specific recommendations were given from the doctor/s that did pre-operative surgical clearance. The patient was then given an education sheet about DVTs and PE with warning signs and symptoms of both and steps to take if they suspect either of these.    This along with the Modified Caprini risk assessment model for VTE in general surgical patients was used to determine the patient's DVT risk.     From: Laure VILLALTA, Olman DA, Lou SM, et al. Prevention of VTE in nonorthopedic surgical patients: antithrombotic therapy and prevention of thrombosis, 9th ed: American College of [employer]icians evidence-based clinical practical guidelines. Chest 2012; 141:e227S. Copyright © 2012. Reproduced with permission from the American College of Chest Physicians.    The below listed DVT prophylaxis regimen along with bilateral ABRAHAM compression stockings will be used post-op. Length of treatment has been determined to be 10-42 days post-op by the above noted Caprini assessment model.     The patient was instructed to buy and take:  Aspirin 81mg QD x 4 weeks for DVT prophylaxis starting on the morning after surgery.  Patient will also use bilateral TEDs on lower extremities, SCDs during surgery, and early ambulation post-op. If the patient was previously taking 81mg baby aspirin, they were told to not take it starting 5 days prior to surgery and to restart the 81mg aspirin after surgery.       Patient was also told to buy over the counter Prilosec medication if needed and take it once daily for GI protection as long as they are taking NSAIDs or Aspirin.   Patient denies history of seizures.      The patient was told that narcotic pain medications may make them drowsy and instructions were given to not sign legal documents, drive or operate heavy machinery, cars, or equipment while under the influence of narcotic medications. The patient was told and understands that narcotic pain medications should only be used as needed to control  pain and that other options of pain control include TENs unit and ice packs/unit.     As there were no other questions to be asked, he was given my business card along with Jossy Samayoa MD business card if he has any questions or concerns prior to surgery or in the postop period.

## 2022-10-26 ENCOUNTER — PATIENT MESSAGE (OUTPATIENT)
Dept: RESEARCH | Facility: HOSPITAL | Age: 42
End: 2022-10-26
Payer: COMMERCIAL

## 2022-10-28 ENCOUNTER — OFFICE VISIT (OUTPATIENT)
Dept: INTERNAL MEDICINE | Facility: CLINIC | Age: 42
End: 2022-10-28
Payer: COMMERCIAL

## 2022-10-28 VITALS
SYSTOLIC BLOOD PRESSURE: 146 MMHG | BODY MASS INDEX: 27.81 KG/M2 | HEIGHT: 66 IN | DIASTOLIC BLOOD PRESSURE: 108 MMHG | WEIGHT: 173.06 LBS | HEART RATE: 85 BPM | OXYGEN SATURATION: 96 %

## 2022-10-28 DIAGNOSIS — J30.1 CHRONIC SEASONAL ALLERGIC RHINITIS DUE TO POLLEN: ICD-10-CM

## 2022-10-28 DIAGNOSIS — J45.20 MILD INTERMITTENT ASTHMA WITHOUT COMPLICATION: ICD-10-CM

## 2022-10-28 DIAGNOSIS — S46.212D BICEPS MUSCLE TEAR, LEFT, SUBSEQUENT ENCOUNTER: ICD-10-CM

## 2022-10-28 DIAGNOSIS — Z01.818 PRE-OP EXAMINATION: Primary | ICD-10-CM

## 2022-10-28 PROBLEM — J45.40 MODERATE PERSISTENT ASTHMA WITHOUT COMPLICATION: Status: RESOLVED | Noted: 2020-09-02 | Resolved: 2022-10-28

## 2022-10-28 PROCEDURE — 3077F SYST BP >= 140 MM HG: CPT | Mod: CPTII,S$GLB,, | Performed by: STUDENT IN AN ORGANIZED HEALTH CARE EDUCATION/TRAINING PROGRAM

## 2022-10-28 PROCEDURE — 3077F PR MOST RECENT SYSTOLIC BLOOD PRESSURE >= 140 MM HG: ICD-10-PCS | Mod: CPTII,S$GLB,, | Performed by: STUDENT IN AN ORGANIZED HEALTH CARE EDUCATION/TRAINING PROGRAM

## 2022-10-28 PROCEDURE — 1159F PR MEDICATION LIST DOCUMENTED IN MEDICAL RECORD: ICD-10-PCS | Mod: CPTII,S$GLB,, | Performed by: STUDENT IN AN ORGANIZED HEALTH CARE EDUCATION/TRAINING PROGRAM

## 2022-10-28 PROCEDURE — 3080F PR MOST RECENT DIASTOLIC BLOOD PRESSURE >= 90 MM HG: ICD-10-PCS | Mod: CPTII,S$GLB,, | Performed by: STUDENT IN AN ORGANIZED HEALTH CARE EDUCATION/TRAINING PROGRAM

## 2022-10-28 PROCEDURE — 3080F DIAST BP >= 90 MM HG: CPT | Mod: CPTII,S$GLB,, | Performed by: STUDENT IN AN ORGANIZED HEALTH CARE EDUCATION/TRAINING PROGRAM

## 2022-10-28 PROCEDURE — 99999 PR PBB SHADOW E&M-EST. PATIENT-LVL V: CPT | Mod: PBBFAC,,, | Performed by: STUDENT IN AN ORGANIZED HEALTH CARE EDUCATION/TRAINING PROGRAM

## 2022-10-28 PROCEDURE — 1159F MED LIST DOCD IN RCRD: CPT | Mod: CPTII,S$GLB,, | Performed by: STUDENT IN AN ORGANIZED HEALTH CARE EDUCATION/TRAINING PROGRAM

## 2022-10-28 PROCEDURE — 99214 OFFICE O/P EST MOD 30 MIN: CPT | Mod: S$GLB,,, | Performed by: STUDENT IN AN ORGANIZED HEALTH CARE EDUCATION/TRAINING PROGRAM

## 2022-10-28 PROCEDURE — 99214 PR OFFICE/OUTPT VISIT, EST, LEVL IV, 30-39 MIN: ICD-10-PCS | Mod: S$GLB,,, | Performed by: STUDENT IN AN ORGANIZED HEALTH CARE EDUCATION/TRAINING PROGRAM

## 2022-10-28 PROCEDURE — 1160F RVW MEDS BY RX/DR IN RCRD: CPT | Mod: CPTII,S$GLB,, | Performed by: STUDENT IN AN ORGANIZED HEALTH CARE EDUCATION/TRAINING PROGRAM

## 2022-10-28 PROCEDURE — 1160F PR REVIEW ALL MEDS BY PRESCRIBER/CLIN PHARMACIST DOCUMENTED: ICD-10-PCS | Mod: CPTII,S$GLB,, | Performed by: STUDENT IN AN ORGANIZED HEALTH CARE EDUCATION/TRAINING PROGRAM

## 2022-10-28 PROCEDURE — 99999 PR PBB SHADOW E&M-EST. PATIENT-LVL V: ICD-10-PCS | Mod: PBBFAC,,, | Performed by: STUDENT IN AN ORGANIZED HEALTH CARE EDUCATION/TRAINING PROGRAM

## 2022-10-28 NOTE — PROGRESS NOTES
SUBJECTIVE     Chief Complaint   Patient presents with    Pre-op Exam       HPI  Felix Stern is a 42 y.o. male with medical diagnoses as listed in the medical history and problem list that presents for preoperative evaluation. Pt is establishing care with me today.    Patient presents today for preoperative evaluation prior to left-sided bicep repair at [facility] with Dr. Samayoa on November 1st.  Patient's medical problems include mild intermittent asthma without complication and chronic seasonal allergies.  Patient does not any chest pain, shortness a breath, difficulty breathing, difficulty lying flat..  Patient is not on any blood thinners.  Patient has not had any difficulty with anesthesia in the past.    METS:  -Can take care of self, such as eat, dress, or use the toilet (1 MET): YES  -Can walk up a flight of steps or a hill or walk on level ground at 3 to 4 mph (4 METs): YES  -Can do heavy work around the house, such as scrubbing floors or lifting or moving heavy  furniture, or climb two flights of stairs (between 4 and 10 METs): YES  -Can participate in strenuous sports such as swimming, singles tennis, football, basketball, and  skiing (&gt;10 METs): YES      Chronic Conditions:    Mild interrmittant asthma:  -Ventolin p.r.n..  As needed for wheezing.  Usually uses inhaler about once every few weeks, especially with exposure to dust or other environmental triggers.  No hospitalizations    Chronic seasonal allergies:  Currently on Flonase twice a day.  Has not tried daily antihistamine.  Interested in seeking evaluation by allergist for allergy injections.  Does note issues with molds and pollens            PAST MEDICAL HISTORY:  Past Medical History:   Diagnosis Date    GERD (gastroesophageal reflux disease)        PAST SURGICAL HISTORY:  Past Surgical History:   Procedure Laterality Date    HAND SURGERY      VASECTOMY         SOCIAL HISTORY:  Social History     Socioeconomic History    Marital  status:    Tobacco Use    Smoking status: Never    Smokeless tobacco: Never   Substance and Sexual Activity    Alcohol use: Yes     Comment: occasionally    Drug use: No       FAMILY HISTORY:  Family History   Problem Relation Age of Onset    Colon cancer Neg Hx     Esophageal cancer Neg Hx     Stomach cancer Neg Hx     Rectal cancer Neg Hx        ALLERGIES AND MEDICATIONS: updated and reviewed.  Review of patient's allergies indicates:  No Known Allergies  Current Outpatient Medications   Medication Sig Dispense Refill    acyclovir (ZOVIRAX) 400 MG tablet       aspirin (ECOTRIN) 81 MG EC tablet Take 1 tablet (81 mg total) by mouth once daily. For 4 weeks starting the day after surgery. 28 tablet 0    citalopram (CELEXA) 20 MG tablet Take 20 mg by mouth once daily.      famotidine (PEPCID) 10 MG tablet Take 10 mg by mouth 2 (two) times daily.      indomethacin (INDOCIN) 25 MG capsule Take 3 capsules (75 mg total) by mouth once daily. Take with food. Take on post-op days 1,2,3,&4. 12 capsule 0    meloxicam (MOBIC) 15 MG tablet TAKE 1 TABLET BY MOUTH DAILY 30 tablet 1    methocarbamol (ROBAXIN) 750 MG Tab TAKE 1 TABLET BY MOUTH NIGHTLY AS NEEDED FOR SPASMS 40 tablet 0    multivitamin (THERAGRAN) per tablet Take 1 tablet by mouth once daily.      naproxen (NAPROSYN) 500 MG tablet Take 1 tablet (500 mg total) by mouth every 12 (twelve) hours. Take with food. Starting on post-op day 5 for 21 days 42 tablet 0    oxyCODONE-acetaminophen (PERCOCET)  mg per tablet Take 1 tablet by mouth every 4-6 hours as needed for pain. Take stool softener with this medication. 15 tablet 0    pantoprazole (PROTONIX) 40 MG tablet Take 40 mg by mouth once daily.      promethazine (PHENERGAN) 25 MG tablet Take 1 tablet (25 mg total) by mouth every 6 (six) hours as needed for Nausea. 8 tablet 0    traMADoL (ULTRAM) 50 mg tablet Take 1-2 tablets ( mg total) by mouth every 6 (six) hours as needed for Pain. 21 tablet 0     "VENTOLIN HFA 90 mcg/actuation inhaler       diazePAM (VALIUM) 5 MG tablet Take 1 tablet (5 mg total) by mouth once. Take all 2-3 pills at once 30-45 minutes prior to procedure. for 1 dose (Patient not taking: Reported on 10/28/2022) 3 tablet 0    ranitidine (ZANTAC) 15 mg/mL syrup Take by mouth every 12 (twelve) hours.       No current facility-administered medications for this visit.       ROS  Review of Systems   Constitutional:  Negative for fever and weight loss.   Respiratory:  Negative for cough and shortness of breath.    Cardiovascular:  Negative for chest pain and palpitations.   Gastrointestinal:  Negative for abdominal pain, constipation, diarrhea, nausea and vomiting.   Genitourinary:  Negative for dysuria.   Musculoskeletal:  Negative for back pain and joint pain.        Left-sided biceps tenderness   Skin:  Negative for rash.   Neurological:  Negative for dizziness, weakness and headaches.   Psychiatric/Behavioral:  Negative for depression. The patient is not nervous/anxious.          OBJECTIVE     Physical Exam  Vitals:    10/28/22 0822   BP: (!) 146/108   Pulse: 85    Body mass index is 27.93 kg/m².  Weight: 78.5 kg (173 lb 1 oz)   Height: 5' 6" (167.6 cm)     Physical Exam  HENT:      Head: Normocephalic and atraumatic.      Nose: Nose normal.      Mouth/Throat:      Mouth: Mucous membranes are moist.      Pharynx: Oropharynx is clear.   Eyes:      Extraocular Movements: Extraocular movements intact.      Conjunctiva/sclera: Conjunctivae normal.      Pupils: Pupils are equal, round, and reactive to light.   Cardiovascular:      Rate and Rhythm: Normal rate and regular rhythm.   Pulmonary:      Effort: Pulmonary effort is normal.      Breath sounds: Normal breath sounds.   Abdominal:      General: There is no distension.      Palpations: Abdomen is soft.      Tenderness: There is no abdominal tenderness.   Musculoskeletal:         General: No swelling. Normal range of motion.      Cervical back: " Normal range of motion.      Right lower leg: No edema.      Left lower leg: No edema.   Skin:     General: Skin is warm.      Findings: No lesion or rash.   Neurological:      General: No focal deficit present.      Mental Status: He is alert and oriented to person, place, and time.      Motor: No weakness.   Psychiatric:         Mood and Affect: Mood normal.         Thought Content: Thought content normal.             ASSESSMENT     42 y.o. male with     1. Pre-op examination    2. Biceps muscle tear, left, subsequent encounter    3. Chronic seasonal allergic rhinitis due to pollen    4. Mild intermittent asthma without complication        PLAN:     1. Pre-op examination   - the patient is personally low risk preoperatively for a general low risk procedure; that said, understanding was expressed that there is an ever present/irreducible operative risk (3.9% calculated) which was found acceptable; the patient wishes to proceed   - medical management:  No medications required discontinuation   - Felix Stern is cleared to proceed with surgery    2. Biceps muscle tear, left, subsequent encounter  Proceed with surgery as described in 1.    3. Chronic seasonal allergic rhinitis due to pollen  Overview:  Currently on Flonase twice a day.  Has not tried daily antihistamine.      Assessment & Plan:  Continue daily Flonase.  Start daily antihistamine.  Referral to [facility] place.    Orders:  -     Ambulatory referral/consult to Allergy; Future; Expected date: 11/04/2022    4. Mild intermittent asthma without complication  Overview:  Albuterol p.r.n.  Uncomplicated, no recent exacerbation    Assessment & Plan:  Stable on medications, continue regimen              RTC as needed    Vilma Muñoz MD  10/28/2022 8:31 AM        No follow-ups on file.

## 2022-11-01 ENCOUNTER — ANESTHESIA (OUTPATIENT)
Dept: SURGERY | Facility: HOSPITAL | Age: 42
End: 2022-11-01
Payer: COMMERCIAL

## 2022-11-01 ENCOUNTER — PATIENT MESSAGE (OUTPATIENT)
Dept: SURGERY | Facility: HOSPITAL | Age: 42
End: 2022-11-01
Payer: COMMERCIAL

## 2022-11-01 ENCOUNTER — HOSPITAL ENCOUNTER (OUTPATIENT)
Facility: HOSPITAL | Age: 42
Discharge: HOME | End: 2022-11-01
Attending: ORTHOPAEDIC SURGERY | Admitting: ORTHOPAEDIC SURGERY
Payer: COMMERCIAL

## 2022-11-01 ENCOUNTER — ANESTHESIA EVENT (OUTPATIENT)
Dept: SURGERY | Facility: HOSPITAL | Age: 42
End: 2022-11-01
Payer: COMMERCIAL

## 2022-11-01 VITALS
HEIGHT: 66 IN | DIASTOLIC BLOOD PRESSURE: 94 MMHG | TEMPERATURE: 98 F | WEIGHT: 173 LBS | BODY MASS INDEX: 27.8 KG/M2 | SYSTOLIC BLOOD PRESSURE: 148 MMHG | OXYGEN SATURATION: 92 % | HEART RATE: 104 BPM | RESPIRATION RATE: 22 BRPM

## 2022-11-01 DIAGNOSIS — S46.212A RUPTURE OF LEFT DISTAL BICEPS TENDON, INITIAL ENCOUNTER: Primary | ICD-10-CM

## 2022-11-01 DIAGNOSIS — G89.18 POST-OPERATIVE PAIN: ICD-10-CM

## 2022-11-01 DIAGNOSIS — S46.212A RUPTURE OF DISTAL BICEPS TENDON, LEFT, INITIAL ENCOUNTER: ICD-10-CM

## 2022-11-01 PROCEDURE — 25000003 PHARM REV CODE 250: Performed by: ANESTHESIOLOGY

## 2022-11-01 PROCEDURE — 63600175 PHARM REV CODE 636 W HCPCS: Performed by: NURSE ANESTHETIST, CERTIFIED REGISTERED

## 2022-11-01 PROCEDURE — D9220A PRA ANESTHESIA: ICD-10-PCS | Mod: CRNA,,, | Performed by: NURSE ANESTHETIST, CERTIFIED REGISTERED

## 2022-11-01 PROCEDURE — 71000033 HC RECOVERY, INTIAL HOUR: Performed by: ORTHOPAEDIC SURGERY

## 2022-11-01 PROCEDURE — D9220A PRA ANESTHESIA: ICD-10-PCS | Mod: ANES,,, | Performed by: ANESTHESIOLOGY

## 2022-11-01 PROCEDURE — 36000709 HC OR TIME LEV III EA ADD 15 MIN: Performed by: ORTHOPAEDIC SURGERY

## 2022-11-01 PROCEDURE — 27201423 OPTIME MED/SURG SUP & DEVICES STERILE SUPPLY: Performed by: ORTHOPAEDIC SURGERY

## 2022-11-01 PROCEDURE — D9220A PRA ANESTHESIA: Mod: CRNA,,, | Performed by: NURSE ANESTHETIST, CERTIFIED REGISTERED

## 2022-11-01 PROCEDURE — 25000003 PHARM REV CODE 250: Performed by: PHYSICIAN ASSISTANT

## 2022-11-01 PROCEDURE — 25000003 PHARM REV CODE 250: Performed by: ORTHOPAEDIC SURGERY

## 2022-11-01 PROCEDURE — 63600175 PHARM REV CODE 636 W HCPCS: Performed by: ORTHOPAEDIC SURGERY

## 2022-11-01 PROCEDURE — 27100025 HC TUBING, SET FLUID WARMER: Performed by: ANESTHESIOLOGY

## 2022-11-01 PROCEDURE — 71000015 HC POSTOP RECOV 1ST HR: Performed by: ORTHOPAEDIC SURGERY

## 2022-11-01 PROCEDURE — 63600175 PHARM REV CODE 636 W HCPCS: Performed by: ANESTHESIOLOGY

## 2022-11-01 PROCEDURE — 36000708 HC OR TIME LEV III 1ST 15 MIN: Performed by: ORTHOPAEDIC SURGERY

## 2022-11-01 PROCEDURE — 24342 PR REINSERT BI/TRICEPS TENDON,DISTAL: ICD-10-PCS | Mod: LT,,, | Performed by: ORTHOPAEDIC SURGERY

## 2022-11-01 PROCEDURE — 37000008 HC ANESTHESIA 1ST 15 MINUTES: Performed by: ORTHOPAEDIC SURGERY

## 2022-11-01 PROCEDURE — 99900035 HC TECH TIME PER 15 MIN (STAT)

## 2022-11-01 PROCEDURE — C1889 IMPLANT/INSERT DEVICE, NOC: HCPCS | Performed by: ORTHOPAEDIC SURGERY

## 2022-11-01 PROCEDURE — 94761 N-INVAS EAR/PLS OXIMETRY MLT: CPT

## 2022-11-01 PROCEDURE — 25000003 PHARM REV CODE 250: Performed by: NURSE ANESTHETIST, CERTIFIED REGISTERED

## 2022-11-01 PROCEDURE — D9220A PRA ANESTHESIA: Mod: ANES,,, | Performed by: ANESTHESIOLOGY

## 2022-11-01 PROCEDURE — C1713 ANCHOR/SCREW BN/BN,TIS/BN: HCPCS | Performed by: ORTHOPAEDIC SURGERY

## 2022-11-01 PROCEDURE — 24342 REPAIR OF RUPTURED TENDON: CPT | Mod: LT,,, | Performed by: ORTHOPAEDIC SURGERY

## 2022-11-01 PROCEDURE — 37000009 HC ANESTHESIA EA ADD 15 MINS: Performed by: ORTHOPAEDIC SURGERY

## 2022-11-01 DEVICE — SYS IMPL DEL DISTAL BICEPS BC: Type: IMPLANTABLE DEVICE | Site: ELBOW | Status: FUNCTIONAL

## 2022-11-01 RX ORDER — MORPHINE SULFATE 2 MG/ML
2 INJECTION, SOLUTION INTRAMUSCULAR; INTRAVENOUS EVERY 10 MIN PRN
Status: DISCONTINUED | OUTPATIENT
Start: 2022-11-01 | End: 2022-11-01 | Stop reason: HOSPADM

## 2022-11-01 RX ORDER — KETOROLAC TROMETHAMINE 30 MG/ML
INJECTION, SOLUTION INTRAMUSCULAR; INTRAVENOUS
Status: DISCONTINUED | OUTPATIENT
Start: 2022-11-01 | End: 2022-11-01 | Stop reason: HOSPADM

## 2022-11-01 RX ORDER — FAMOTIDINE 10 MG/ML
INJECTION INTRAVENOUS
Status: DISCONTINUED | OUTPATIENT
Start: 2022-11-01 | End: 2022-11-01

## 2022-11-01 RX ORDER — MIDAZOLAM HYDROCHLORIDE 1 MG/ML
INJECTION INTRAMUSCULAR; INTRAVENOUS
Status: DISCONTINUED | OUTPATIENT
Start: 2022-11-01 | End: 2022-11-01

## 2022-11-01 RX ORDER — ROPIVACAINE HYDROCHLORIDE 5 MG/ML
INJECTION, SOLUTION EPIDURAL; INFILTRATION; PERINEURAL
Status: DISCONTINUED | OUTPATIENT
Start: 2022-11-01 | End: 2022-11-01 | Stop reason: HOSPADM

## 2022-11-01 RX ORDER — DEXAMETHASONE SODIUM PHOSPHATE 4 MG/ML
INJECTION, SOLUTION INTRA-ARTICULAR; INTRALESIONAL; INTRAMUSCULAR; INTRAVENOUS; SOFT TISSUE
Status: DISCONTINUED | OUTPATIENT
Start: 2022-11-01 | End: 2022-11-01

## 2022-11-01 RX ORDER — LIDOCAINE HCL/PF 100 MG/5ML
SYRINGE (ML) INTRAVENOUS
Status: DISCONTINUED | OUTPATIENT
Start: 2022-11-01 | End: 2022-11-01

## 2022-11-01 RX ORDER — PHENYLEPHRINE HYDROCHLORIDE 10 MG/ML
INJECTION INTRAVENOUS
Status: DISCONTINUED | OUTPATIENT
Start: 2022-11-01 | End: 2022-11-01

## 2022-11-01 RX ORDER — CLINDAMYCIN PHOSPHATE 900 MG/50ML
900 INJECTION, SOLUTION INTRAVENOUS
Status: COMPLETED | OUTPATIENT
Start: 2022-11-01 | End: 2022-11-01

## 2022-11-01 RX ORDER — CARBOXYMETHYLCELLULOSE SODIUM 10 MG/ML
GEL OPHTHALMIC
Status: DISCONTINUED | OUTPATIENT
Start: 2022-11-01 | End: 2022-11-01

## 2022-11-01 RX ORDER — FENTANYL CITRATE 50 UG/ML
INJECTION, SOLUTION INTRAMUSCULAR; INTRAVENOUS
Status: DISCONTINUED | OUTPATIENT
Start: 2022-11-01 | End: 2022-11-01

## 2022-11-01 RX ORDER — PREGABALIN 75 MG/1
75 CAPSULE ORAL ONCE
Status: COMPLETED | OUTPATIENT
Start: 2022-11-01 | End: 2022-11-01

## 2022-11-01 RX ORDER — KETAMINE HYDROCHLORIDE 100 MG/ML
INJECTION, SOLUTION INTRAMUSCULAR; INTRAVENOUS
Status: DISCONTINUED | OUTPATIENT
Start: 2022-11-01 | End: 2022-11-01 | Stop reason: HOSPADM

## 2022-11-01 RX ORDER — ESOMEPRAZOLE MAGNESIUM 10 MG/1
10 GRANULE, FOR SUSPENSION, EXTENDED RELEASE ORAL
COMMUNITY

## 2022-11-01 RX ORDER — SODIUM CHLORIDE 9 MG/ML
INJECTION, SOLUTION INTRAVENOUS CONTINUOUS
Status: DISCONTINUED | OUTPATIENT
Start: 2022-11-01 | End: 2022-11-01 | Stop reason: HOSPADM

## 2022-11-01 RX ORDER — METHOCARBAMOL 500 MG/1
1000 TABLET, FILM COATED ORAL ONCE
Status: COMPLETED | OUTPATIENT
Start: 2022-11-01 | End: 2022-11-01

## 2022-11-01 RX ORDER — OXYCODONE HCL 10 MG/1
10 TABLET, FILM COATED, EXTENDED RELEASE ORAL
Status: COMPLETED | OUTPATIENT
Start: 2022-11-01 | End: 2022-11-01

## 2022-11-01 RX ORDER — OXYCODONE HYDROCHLORIDE 5 MG/1
5 TABLET ORAL ONCE
Status: COMPLETED | OUTPATIENT
Start: 2022-11-01 | End: 2022-11-01

## 2022-11-01 RX ORDER — HYDROMORPHONE HYDROCHLORIDE 1 MG/ML
0.2 INJECTION, SOLUTION INTRAMUSCULAR; INTRAVENOUS; SUBCUTANEOUS EVERY 5 MIN PRN
Status: DISCONTINUED | OUTPATIENT
Start: 2022-11-01 | End: 2022-11-01 | Stop reason: HOSPADM

## 2022-11-01 RX ORDER — ONDANSETRON HYDROCHLORIDE 2 MG/ML
INJECTION, SOLUTION INTRAMUSCULAR; INTRAVENOUS
Status: DISCONTINUED | OUTPATIENT
Start: 2022-11-01 | End: 2022-11-01

## 2022-11-01 RX ORDER — NEOSTIGMINE METHYLSULFATE 1 MG/ML
INJECTION, SOLUTION INTRAVENOUS
Status: DISCONTINUED | OUTPATIENT
Start: 2022-11-01 | End: 2022-11-01

## 2022-11-01 RX ORDER — ROCURONIUM BROMIDE 10 MG/ML
INJECTION, SOLUTION INTRAVENOUS
Status: DISCONTINUED | OUTPATIENT
Start: 2022-11-01 | End: 2022-11-01

## 2022-11-01 RX ORDER — PROPOFOL 10 MG/ML
VIAL (ML) INTRAVENOUS
Status: DISCONTINUED | OUTPATIENT
Start: 2022-11-01 | End: 2022-11-01

## 2022-11-01 RX ORDER — AMOXICILLIN 250 MG/1
250 CAPSULE ORAL
Status: ON HOLD | COMMUNITY
End: 2022-12-08 | Stop reason: HOSPADM

## 2022-11-01 RX ORDER — EPHEDRINE SULFATE 50 MG/ML
INJECTION, SOLUTION INTRAVENOUS
Status: DISCONTINUED | OUTPATIENT
Start: 2022-11-01 | End: 2022-11-01

## 2022-11-01 RX ORDER — OXYCODONE HYDROCHLORIDE 5 MG/1
10 TABLET ORAL EVERY 4 HOURS PRN
Status: DISCONTINUED | OUTPATIENT
Start: 2022-11-01 | End: 2022-11-01 | Stop reason: HOSPADM

## 2022-11-01 RX ORDER — PREGABALIN 75 MG/1
75 CAPSULE ORAL
Status: COMPLETED | OUTPATIENT
Start: 2022-11-01 | End: 2022-11-01

## 2022-11-01 RX ORDER — TRAMADOL HYDROCHLORIDE 50 MG/1
100 TABLET ORAL EVERY 6 HOURS PRN
Status: DISCONTINUED | OUTPATIENT
Start: 2022-11-01 | End: 2022-11-01 | Stop reason: HOSPADM

## 2022-11-01 RX ORDER — ONDANSETRON 2 MG/ML
4 INJECTION INTRAMUSCULAR; INTRAVENOUS EVERY 12 HOURS PRN
Status: DISCONTINUED | OUTPATIENT
Start: 2022-11-01 | End: 2022-11-01 | Stop reason: HOSPADM

## 2022-11-01 RX ORDER — SODIUM CHLORIDE 0.9 % (FLUSH) 0.9 %
3 SYRINGE (ML) INJECTION
Status: DISCONTINUED | OUTPATIENT
Start: 2022-11-01 | End: 2022-11-01 | Stop reason: HOSPADM

## 2022-11-01 RX ORDER — KETAMINE HCL IN 0.9 % NACL 50 MG/5 ML
SYRINGE (ML) INTRAVENOUS
Status: DISCONTINUED | OUTPATIENT
Start: 2022-11-01 | End: 2022-11-01

## 2022-11-01 RX ORDER — PROMETHAZINE HYDROCHLORIDE 25 MG/1
25 TABLET ORAL EVERY 6 HOURS PRN
Status: DISCONTINUED | OUTPATIENT
Start: 2022-11-01 | End: 2022-11-01 | Stop reason: HOSPADM

## 2022-11-01 RX ADMIN — Medication 30 MG: at 09:11

## 2022-11-01 RX ADMIN — PREGABALIN 75 MG: 75 CAPSULE ORAL at 11:11

## 2022-11-01 RX ADMIN — GLYCOPYRROLATE 0.4 MG: 0.2 INJECTION, SOLUTION INTRAMUSCULAR; INTRAVENOUS at 10:11

## 2022-11-01 RX ADMIN — HYDROMORPHONE HYDROCHLORIDE 0.2 MG: 1 INJECTION, SOLUTION INTRAMUSCULAR; INTRAVENOUS; SUBCUTANEOUS at 11:11

## 2022-11-01 RX ADMIN — DEXAMETHASONE SODIUM PHOSPHATE 8 MG: 4 INJECTION, SOLUTION INTRAMUSCULAR; INTRAVENOUS at 09:11

## 2022-11-01 RX ADMIN — FAMOTIDINE 20 MG: 10 INJECTION, SOLUTION INTRAVENOUS at 09:11

## 2022-11-01 RX ADMIN — LIDOCAINE HYDROCHLORIDE 60 MG: 20 INJECTION, SOLUTION INTRAVENOUS at 09:11

## 2022-11-01 RX ADMIN — CARBOXYMETHYLCELLULOSE SODIUM 4 DROP: 10 GEL OPHTHALMIC at 09:11

## 2022-11-01 RX ADMIN — SODIUM CHLORIDE: 0.9 INJECTION, SOLUTION INTRAVENOUS at 07:11

## 2022-11-01 RX ADMIN — OXYCODONE HYDROCHLORIDE 10 MG: 10 TABLET, FILM COATED, EXTENDED RELEASE ORAL at 07:11

## 2022-11-01 RX ADMIN — PHENYLEPHRINE HYDROCHLORIDE 100 MCG: 10 INJECTION INTRAVENOUS at 09:11

## 2022-11-01 RX ADMIN — NEOSTIGMINE METHYLSULFATE 4 MG: 1 INJECTION INTRAVENOUS at 10:11

## 2022-11-01 RX ADMIN — PROPOFOL 200 MG: 10 INJECTION, EMULSION INTRAVENOUS at 09:11

## 2022-11-01 RX ADMIN — ROCURONIUM BROMIDE 50 MG: 10 INJECTION INTRAVENOUS at 09:11

## 2022-11-01 RX ADMIN — SODIUM CHLORIDE, SODIUM GLUCONATE, SODIUM ACETATE, POTASSIUM CHLORIDE, MAGNESIUM CHLORIDE, SODIUM PHOSPHATE, DIBASIC, AND POTASSIUM PHOSPHATE: .53; .5; .37; .037; .03; .012; .00082 INJECTION, SOLUTION INTRAVENOUS at 09:11

## 2022-11-01 RX ADMIN — PREGABALIN 75 MG: 75 CAPSULE ORAL at 07:11

## 2022-11-01 RX ADMIN — HYDROMORPHONE HYDROCHLORIDE 0.2 MG: 1 INJECTION, SOLUTION INTRAMUSCULAR; INTRAVENOUS; SUBCUTANEOUS at 12:11

## 2022-11-01 RX ADMIN — EPHEDRINE SULFATE 10 MG: 50 INJECTION INTRAVENOUS at 09:11

## 2022-11-01 RX ADMIN — METHOCARBAMOL 1000 MG: 500 TABLET ORAL at 11:11

## 2022-11-01 RX ADMIN — OXYCODONE 5 MG: 5 TABLET ORAL at 11:11

## 2022-11-01 RX ADMIN — FENTANYL CITRATE 100 MCG: 50 INJECTION, SOLUTION INTRAMUSCULAR; INTRAVENOUS at 09:11

## 2022-11-01 RX ADMIN — CLINDAMYCIN IN 5 PERCENT DEXTROSE 900 MG: 18 INJECTION, SOLUTION INTRAVENOUS at 09:11

## 2022-11-01 RX ADMIN — MIDAZOLAM HYDROCHLORIDE 2 MG: 1 INJECTION, SOLUTION INTRAMUSCULAR; INTRAVENOUS at 08:11

## 2022-11-01 RX ADMIN — ONDANSETRON 4 MG: 2 INJECTION INTRAMUSCULAR; INTRAVENOUS at 10:11

## 2022-11-01 NOTE — TRANSFER OF CARE
"Anesthesia Transfer of Care Note    Patient: Felix Stern    Procedure(s) Performed: Procedure(s) (LRB):  REPAIR,TENDON,DISTAL PROXIMAL, BICEPS (Left)    Patient location: PACU    Anesthesia Type: general    Transport from OR: Transported from OR on 6-10 L/min O2 by face mask with adequate spontaneous ventilation    Post pain: adequate analgesia    Post assessment: no apparent anesthetic complications    Post vital signs: stable    Level of consciousness: awake and alert    Nausea/Vomiting: no nausea/vomiting    Complications: none    Transfer of care protocol was followed      Last vitals:   Visit Vitals  BP (!) 134/93 (BP Location: Right arm, Patient Position: Lying)   Pulse 75   Temp 36.7 °C (98.1 °F) (Oral)   Resp 16   Ht 5' 6" (1.676 m)   Wt 78.5 kg (173 lb)   SpO2 97%   BMI 27.92 kg/m²     "

## 2022-11-01 NOTE — OP NOTE
DATE OF PROCEDURE: 11/01/2022    SURGEON: Jossy Samayoa M.D.     ASSISTANT: BILLY Talbert M.D. ([id number])  ASSISTANT: MICHAELA Viera PA-C      PREOPERATIVE DIAGNOSIS: Left distal biceps rupture.     POSTOPERATIVE DIAGNOSIS: Left distal biceps rupture.     PROCEDURE PERFORMED:    1. Open repair of Left distal biceps tendon (CPT 38547).     ANESTHESIA: General     ESTIMATED BLOOD LOSS: 5cc.     TOURNIQUET TIME: 49 minutes.     COMPLICATIONS: None.     BRIEF INDICATION OF MEDICAL NECESSITY: The patient is a 42 y.o. year-old male  who was seen in the office with a history with a history and physical examination findings consistent with the above. Nonoperative versus operative options were discussed. The risks and benefits were discussed with the patient. The patient acknowledged understanding and wished to proceed with operative intervention. Informed consent was obtained prior to the procedure. Details of the surgical procedure were explained, including incisions and probable rehabilitation course. The patient understands the likely length of convalescence after surgery; and we have explained the risks, benefits, and alternatives of surgery. Reasonable expectations and potential complications were discussed and acknowledged, including but not limited to infection, bleeding, blood clots, (DVT and/or PE), nerve injury, retear, instability, hardware complications, fracture, continued pain and stiffness, cardiopulmonary compromise and anesthetic complications. Also, risk of heterotopic ossification and neuropraxia were also explained. . It was also explained that there was a chance of failure which may require further management and procedure(s). The patient agreed and understood and wished to proceed.     PROCEDURE IN DETAIL: The correct operative site was marked and consents were verified, and patient was taken to the Operating Room and placed supine on the operating table. General endotracheal anesthesia was then applied via  the Anesthesia Team. All pressure points were well padded. A tourniquet was placed high on the operative arm. The operative upper extremity was prepped and draped in the usual sterile fashion.     Using Esmarch for exsanguination, the tourniquet was insufflated to 250 mmHg. Appropriate landmarks were noticed on the skin. A 3 cm transverse incision was made approximately 3cm distal to the antecubital crease. This was done in supination. The skin of subcutaneous tissue was dissected. The lacertus fibrosus was identified. Skin and subcutaneous tissues were dissected sharply. During dissection, the lateral antebrachial cutaneous nerve was identified and protected throughout the case. After dissection, biceps tendon stump was identified.  The normal pathway for the biceps was then manually traced back down to the biceps tuberosity on the radius. The remaining stump on the biceps tendon was dissected and cleared of all fibrotic or fatty tissue. The tendon was brought up to its normal length with an Allis clamp, then loop stitched the distal end of the biceps tendon using fiber loop suture.     The tract of the biceps tendon was then followed down to the level of the radial tuberosity. Pronating and supinating of the forearm was used to localize the radial tuberosity. The curved Annika clamps were then placed down through the antecubital fossa through the tracts of the biceps tendon along the radius with the arm in pronation. The curve was around radius through the supinator and extensor mass. It was palpated posteriorly.     A 6-cm incision was then made posteriorly along the length of the Annika clamp. The fascia was then incised in line with the incision. No Marisol retractor was placed radially in order to protect the PIN.  PIN was identified and carefully protected this throughout the entire case while dividing muscle fibers down to the level of the radius.  Care was taken to not excessively retract near the PIN  throughout the case. The radial tuberosity was then cleared while the arm was in full pronation and flexion. Graft was sized to a 8 mm. Guide pin was then placed in the appropriate position along the radial tuberosity and a 8 mm drill was then used to go through the near cortex. Care was taken to preserve the far cortex. This was done in full pronation.  Copious and gentle irrigation was performed twice.  Anatomical repair of the distal biceps tendon was commenced using tension-slide technique. The biceps button was loaded, sutures and biceps button and attachment were intact. Button was placed through the hole through the floor of cortex, and flipped appropriately. The button was then visually inspected and palpated and indeed had flipped. No soft tissue was interposing. Tendon was then tensioned down into the socket tunnel as above. One of the sutures was then used to place a knot through the middle of the tendon and it was tied.  Irrigation was performed again.      Fixation was solid. Upon extending the elbow, the biceps became more tensioned at approximately 20 degrees. The patient had full pronation and supination. Both wounds were then thoroughly irrigated. Tourniquet was released and hemostasis was achieved using electrocautery. The lacertus fibrosus was allowed to lie on top of the biceps tendon. The fascia layer was closed. Subcutaneous tissue was then closed using 3-0 Vicryl interrupted suture. Monocryl 4-0 was then used subcuticular. The Steri-Strips were placed along with Xeroform, 4 x 4's, and cast padding and the posterior split at 90 degrees. TENS unit pads were placed which were medically necessary for pain relief.      The patient was then awoken from general endotracheal anesthesia and transported to the Recovery Room in good stable condition, with compartments soft and capillary refill less than a second in all digits.     POSTOPERATIVE CARE: Will keep patient in splint for approximately 10 days.  At that time, will take out and begin dynamic splinting.

## 2022-11-01 NOTE — PLAN OF CARE
VSS.  Patient tolerating oral liquids without difficulty.   No apparent s&s of distress noted at this time, no complaints voiced at this time.   Discharge instructions reviewed with patient/wife with good verbal feedback received.   Post op medications given at BS  Patient ready for discharge.

## 2022-11-01 NOTE — ANESTHESIA PREPROCEDURE EVALUATION
11/01/2022  Felix Stern is a 42 y.o., male.    Pre-operative evaluation for Procedure(s) (LRB):  REPAIR,TENDON,DISTAL PROXIMAL (Left)    Felix Stern is a 42 y.o. male     Patient Active Problem List   Diagnosis    Gastroesophageal reflux disease without esophagitis    Left shoulder pain    Biceps muscle tear, left, subsequent encounter    Chronic seasonal allergic rhinitis due to pollen    Mild intermittent asthma       Review of patient's allergies indicates:  No Known Allergies    No current facility-administered medications on file prior to encounter.     Current Outpatient Medications on File Prior to Encounter   Medication Sig Dispense Refill    acyclovir (ZOVIRAX) 400 MG tablet       amoxicillin (AMOXIL) 250 MG capsule Take 250 mg by mouth every 8 (eight) hours.      citalopram (CELEXA) 20 MG tablet Take 20 mg by mouth once daily.      esomeprazole magnesium (NEXIUM) 10 mg suspension Take 10 mg by mouth before breakfast.      famotidine (PEPCID) 10 MG tablet Take 10 mg by mouth 2 (two) times daily.      multivitamin (THERAGRAN) per tablet Take 1 tablet by mouth once daily.      VENTOLIN HFA 90 mcg/actuation inhaler       aspirin (ECOTRIN) 81 MG EC tablet Take 1 tablet (81 mg total) by mouth once daily. For 4 weeks starting the day after surgery. 28 tablet 0    diazePAM (VALIUM) 5 MG tablet Take 1 tablet (5 mg total) by mouth once. Take all 2-3 pills at once 30-45 minutes prior to procedure. for 1 dose (Patient not taking: Reported on 10/28/2022) 3 tablet 0    indomethacin (INDOCIN) 25 MG capsule Take 3 capsules (75 mg total) by mouth once daily. Take with food. Take on post-op days 1,2,3,&4. 12 capsule 0    meloxicam (MOBIC) 15 MG tablet TAKE 1 TABLET BY MOUTH DAILY 30 tablet 1    methocarbamol (ROBAXIN) 750 MG Tab TAKE 1 TABLET BY MOUTH NIGHTLY AS NEEDED FOR  "SPASMS 40 tablet 0    naproxen (NAPROSYN) 500 MG tablet Take 1 tablet (500 mg total) by mouth every 12 (twelve) hours. Take with food. Starting on post-op day 5 for 21 days 42 tablet 0    oxyCODONE-acetaminophen (PERCOCET)  mg per tablet Take 1 tablet by mouth every 4-6 hours as needed for pain. Take stool softener with this medication. 15 tablet 0    pantoprazole (PROTONIX) 40 MG tablet Take 40 mg by mouth once daily.      promethazine (PHENERGAN) 25 MG tablet Take 1 tablet (25 mg total) by mouth every 6 (six) hours as needed for Nausea. 8 tablet 0    ranitidine (ZANTAC) 15 mg/mL syrup Take by mouth every 12 (twelve) hours.      traMADoL (ULTRAM) 50 mg tablet Take 1-2 tablets ( mg total) by mouth every 6 (six) hours as needed for Pain. 21 tablet 0       Past Surgical History:   Procedure Laterality Date    HAND SURGERY      VASECTOMY           CBC:  No results found for: WBC, RBC, HGB, HCT, PLT, MCV, MCH, MCHC    CMP:   No results found for: NA, K, CL, CO2, BUN, CREATININE, GLU, MG, PHOS, CALCIUM, ALBUMIN, PROT, ALKPHOS, ALT, AST, BILITOT    INR:  No results found for: PT, INR, PROTIME, APTT      Diagnostic Studies:      EKG:   No results found for this or any previous visit.     2D Echo:  No results found for this or any previous visit.    Stress Test:   No results found for this or any previous visit.      Pre-op Vitals [11/01/22 0731]   BP Pulse Resp Temp SpO2   (!) 134/93 76 12 36.7 °C (98.1 °F) 97 %      Height Weight BMI (Calculated)     5' 6" 173 lb 27.9         Pre-op Vitals [11/01/22 0731]   BP Pulse Resp Temp SpO2   (!) 134/93 76 12 36.7 °C (98.1 °F) 97 %      Height Weight BMI (Calculated)     5' 6" 173 lb 27.9            Pre-op Assessment    I have reviewed the Patient Summary Reports.     I have reviewed the Nursing Notes. I have reviewed the NPO Status.      Review of Systems  Anesthesia Hx:  No problems with previous Anesthesia    Social:  No Alcohol Use, Non-Smoker  "   Cardiovascular:   Exercise tolerance: good Hypertension Not on meds for HTN but monitoring now   Pulmonary:   Asthma    Hepatic/GI:   GERD, well controlled        Physical Exam  General: Well nourished    Airway:  Mallampati: II   Mouth Opening: Normal  TM Distance: Normal  Neck ROM: Normal ROM    Dental:  Intact    Chest/Lungs:  Clear to auscultation, Normal Respiratory Rate    Heart:  Rate: Normal  Rhythm: Regular Rhythm        Anesthesia Plan  Type of Anesthesia, risks & benefits discussed:    Anesthesia Type: Gen ETT  Intra-op Monitoring Plan: Standard ASA Monitors  Post Op Pain Control Plan: multimodal analgesia and IV/PO Opioids PRN  Induction:  IV  Informed Consent: Informed consent signed with the Patient and all parties understand the risks and agree with anesthesia plan.  All questions answered.   ASA Score: 2    Ready For Surgery From Anesthesia Perspective.     .

## 2022-11-01 NOTE — ANESTHESIA PROCEDURE NOTES
Intubation    Date/Time: 11/1/2022 9:11 AM  Performed by: Mirela Rashid CRNA  Authorized by: Katie Carballo MD     Intubation:     Induction:  Intravenous    Intubated:  Postinduction    Mask Ventilation:  Easy mask    Attempts:  1    Attempted By:  CRNA    Method of Intubation:  Direct    Blade:  Murry 2    Laryngeal View Grade: Grade I - full view of cords      Difficult Airway Encountered?: No      Complications:  None    Airway Device:  Oral endotracheal tube    Airway Device Size:  7.5    Style/Cuff Inflation:  Cuffed    Inflation Amount (mL):  5    Tube secured:  23    Secured at:  The lips    Placement Verified By:  Capnometry    Complicating Factors:  None    Findings Post-Intubation:  BS equal bilateral and atraumatic/condition of teeth unchanged

## 2022-11-01 NOTE — PLAN OF CARE
Pre op complete. Questions answered. Resting comfortably. Call light in reach. Belongings in locker including cell phone.

## 2022-11-03 ENCOUNTER — TELEPHONE (OUTPATIENT)
Dept: ALLERGY | Facility: CLINIC | Age: 42
End: 2022-11-03
Payer: COMMERCIAL

## 2022-11-03 NOTE — TELEPHONE ENCOUNTER
Called and spoke with patient.  Rescheduled appt with Dr. Gregory on 12/21/22 to Dr. Contreras on 12/6/22.  Dr. Gregory is on vacation that day.  Sent new appt reminder to pt.

## 2022-11-03 NOTE — ANESTHESIA POSTPROCEDURE EVALUATION
Anesthesia Post Evaluation    Patient: Felix Stern    Procedure(s) Performed: Procedure(s) (LRB):  REPAIR,TENDON,DISTAL PROXIMAL, BICEPS (Left)    Final Anesthesia Type: general      Patient location during evaluation: GI PACU  Patient participation: Yes- Able to Participate  Level of consciousness: awake and alert  Post-procedure vital signs: reviewed and stable  Pain management: adequate  Airway patency: patent    PONV status at discharge: No PONV  Anesthetic complications: no      Cardiovascular status: blood pressure returned to baseline  Respiratory status: unassisted  Hydration status: euvolemic  Follow-up not needed.          Vitals Value Taken Time   /94 11/01/22 1216   Temp 36.4 °C (97.6 °F) 11/01/22 1130   Pulse 103 11/01/22 1216   Resp 18 11/01/22 1216   SpO2 93 % 11/01/22 1216   Vitals shown include unvalidated device data.      Event Time   Out of Recovery 12:12:00         Pain/Romie Score: No data recorded

## 2022-11-11 ENCOUNTER — OFFICE VISIT (OUTPATIENT)
Dept: SPORTS MEDICINE | Facility: CLINIC | Age: 42
End: 2022-11-11
Payer: COMMERCIAL

## 2022-11-11 ENCOUNTER — CLINICAL SUPPORT (OUTPATIENT)
Dept: REHABILITATION | Facility: HOSPITAL | Age: 42
End: 2022-11-11
Payer: COMMERCIAL

## 2022-11-11 VITALS
HEART RATE: 89 BPM | HEIGHT: 66 IN | BODY MASS INDEX: 27.97 KG/M2 | DIASTOLIC BLOOD PRESSURE: 108 MMHG | WEIGHT: 174 LBS | SYSTOLIC BLOOD PRESSURE: 159 MMHG

## 2022-11-11 DIAGNOSIS — R29.898 WEAKNESS OF LEFT UPPER EXTREMITY: ICD-10-CM

## 2022-11-11 DIAGNOSIS — M25.522 ELBOW PAIN, LEFT: Primary | ICD-10-CM

## 2022-11-11 DIAGNOSIS — M25.622 DECREASED RANGE OF MOTION OF LEFT ELBOW: ICD-10-CM

## 2022-11-11 DIAGNOSIS — S46.212A RUPTURE OF DISTAL BICEPS TENDON, LEFT, INITIAL ENCOUNTER: ICD-10-CM

## 2022-11-11 DIAGNOSIS — Z09 S/P ORTHOPEDIC SURGERY, FOLLOW-UP EXAM: ICD-10-CM

## 2022-11-11 DIAGNOSIS — R52 PAIN AGGRAVATED BY LIFTING: ICD-10-CM

## 2022-11-11 DIAGNOSIS — S46.212A RUPTURE OF LEFT DISTAL BICEPS TENDON, INITIAL ENCOUNTER: ICD-10-CM

## 2022-11-11 PROCEDURE — 97161 PT EVAL LOW COMPLEX 20 MIN: CPT

## 2022-11-11 PROCEDURE — 1159F MED LIST DOCD IN RCRD: CPT | Mod: CPTII,S$GLB,, | Performed by: PHYSICIAN ASSISTANT

## 2022-11-11 PROCEDURE — 3008F PR BODY MASS INDEX (BMI) DOCUMENTED: ICD-10-PCS | Mod: CPTII,S$GLB,, | Performed by: PHYSICIAN ASSISTANT

## 2022-11-11 PROCEDURE — 3080F PR MOST RECENT DIASTOLIC BLOOD PRESSURE >= 90 MM HG: ICD-10-PCS | Mod: CPTII,S$GLB,, | Performed by: PHYSICIAN ASSISTANT

## 2022-11-11 PROCEDURE — 99999 PR PBB SHADOW E&M-EST. PATIENT-LVL IV: ICD-10-PCS | Mod: PBBFAC,,, | Performed by: PHYSICIAN ASSISTANT

## 2022-11-11 PROCEDURE — 99213 PR OFFICE/OUTPT VISIT, EST, LEVL III, 20-29 MIN: ICD-10-PCS | Mod: 24,S$GLB,, | Performed by: PHYSICIAN ASSISTANT

## 2022-11-11 PROCEDURE — 3008F BODY MASS INDEX DOCD: CPT | Mod: CPTII,S$GLB,, | Performed by: PHYSICIAN ASSISTANT

## 2022-11-11 PROCEDURE — 1160F RVW MEDS BY RX/DR IN RCRD: CPT | Mod: CPTII,S$GLB,, | Performed by: PHYSICIAN ASSISTANT

## 2022-11-11 PROCEDURE — 3080F DIAST BP >= 90 MM HG: CPT | Mod: CPTII,S$GLB,, | Performed by: PHYSICIAN ASSISTANT

## 2022-11-11 PROCEDURE — 1159F PR MEDICATION LIST DOCUMENTED IN MEDICAL RECORD: ICD-10-PCS | Mod: CPTII,S$GLB,, | Performed by: PHYSICIAN ASSISTANT

## 2022-11-11 PROCEDURE — 99999 PR PBB SHADOW E&M-EST. PATIENT-LVL IV: CPT | Mod: PBBFAC,,, | Performed by: PHYSICIAN ASSISTANT

## 2022-11-11 PROCEDURE — 99213 OFFICE O/P EST LOW 20 MIN: CPT | Mod: 24,S$GLB,, | Performed by: PHYSICIAN ASSISTANT

## 2022-11-11 PROCEDURE — 3077F PR MOST RECENT SYSTOLIC BLOOD PRESSURE >= 140 MM HG: ICD-10-PCS | Mod: CPTII,S$GLB,, | Performed by: PHYSICIAN ASSISTANT

## 2022-11-11 PROCEDURE — 97110 THERAPEUTIC EXERCISES: CPT

## 2022-11-11 PROCEDURE — 3077F SYST BP >= 140 MM HG: CPT | Mod: CPTII,S$GLB,, | Performed by: PHYSICIAN ASSISTANT

## 2022-11-11 PROCEDURE — 1160F PR REVIEW ALL MEDS BY PRESCRIBER/CLIN PHARMACIST DOCUMENTED: ICD-10-PCS | Mod: CPTII,S$GLB,, | Performed by: PHYSICIAN ASSISTANT

## 2022-11-11 NOTE — PLAN OF CARE
[facility]  Physical Therapy Initial Evaluation    Name: Felix Issa [facility]  Clinic Number: [id number]    Therapy Diagnosis:   Encounter Diagnoses   Name Primary?    Rupture of distal biceps tendon, left, initial encounter     Decreased range of motion of left elbow     Weakness of left upper extremity     Pain aggravated by lifting      Physician: Marcin Viera III, *    Physician Orders: PT Eval and Treat  Medical Diagnosis from Referral: S46.212A (ICD-10-CM) - Rupture of distal biceps tendon, left, initial encounter  Evaluation Date: 11/11/2022  Authorization Period Expiration: 12/31/2022  Plan of Care Expiration: 03/01/2023  Visit # / Visits authorized: 1/ 1    Time In: 0820 (Pt arrived late)  Time Out: 0855  Total Billable Time: 35 minutes    Precautions: Standard    DOS: 11/1/2022    PROCEDURE PERFORMED:    1. Open repair of Left distal biceps tendon (CPT 08508).     Subjective     Date of onset: 11/1/2022  History of current condition - Felix presents today 10 days s/p L distal biceps tendon repair. He reports an initial injury of using his left arm to turn a crank when he felt a pop and had pain. He has been pretty much pain free after the first couple of days following surgery. He does report one incidence of numbness in his hand because the wrap was too tight which resolved with adjustment. He admits to using his involved side to hold something briefly and had brief pain. Patient denies fever, chills, body aches, vomiting, or general malaise. He also denies swelling of the wrist and hand or painful breathing.        Imaging   MRI HUMERUS WITHOUT CONTRAST LEFT     CLINICAL HISTORY:  Upper arm trauma, neurovasc/lig/tendon injury suspected;bicep tendon tear;  Pain in left arm     TECHNIQUE:  Multiplanar multi format MRI of left humerus performed without the use of intravenous contrast.     COMPARISON:  Elbow radiograph 09/27/2022     FINDINGS:  Bone: No fracture or  infiltrative process.  Bone marrow signal is maintained.     Muscles/tendons: Irregular thickening and fluid about the visualized portion of the distal biceps tendon. The insertion of the distal biceps tendon is not included in the field of view..  Lacertus fibrosus has an irregular appearance with surrounding fluid suggesting sprain.  Remaining muscles demonstrate normal bulk and signal.     Miscellaneous: No masses or fluid collections.  Neurovascular bundle is unremarkable.  No axillary lymphadenopathy.  There is a suspected tear of the supraspinatus tendon, suboptimally visualized.     Impression:     1. Partial or complete tear of insertional biceps tendon, not adequately visualized.  Recommend further evaluation with MRI elbow.  2. Suspected tear of the supraspinatus tendon, suboptimally visualized.  MRI of the shoulder may be helpful if clinically warranted.    Prior Therapy: None  Social History: Lives at home  Occupation: Owns own business  Prior Level of Function: Independent and unrestricted with all functional tasks and ADLs  Current Level of Function: Limited with all functional tasks involving use of UE.     Pain:  Current 0/10, worst 5/10, best 0/10   Location: left elbow  Description: Sharp  Aggravating Factors: Lifting  Easing Factors: pain medication and rest    Pts goals: Return to PLOF with full use of L UE to allow him to perform his work without limitation      Medical History:   Past Medical History:   Diagnosis Date    GERD (gastroesophageal reflux disease)        Surgical History:   Felix Issa Jarred  has a past surgical history that includes Hand surgery and Vasectomy.    Medications:   Felix has a current medication list which includes the following prescription(s): acyclovir, amoxicillin, aspirin, citalopram, diazepam, esomeprazole magnesium, famotidine, indomethacin, meloxicam, methocarbamol, multivitamin, naproxen, oxycodone-acetaminophen, pantoprazole, promethazine, ranitidine,  "tramadol, and ventolin hfa.    Allergies:   Review of patient's allergies indicates:  No Known Allergies     Objective     Active Range of Motion:   Elbow Left Right   Flexion 90 150   extension 30 0   pronation 90 90   supination 80 80           Passive Range of Motion:   Elbow Left Right   Flexion 100 155   Extension 30 0   Pronation 90 90   Supination 80 80       Upper Extremity Strength  Not performed secondary to post-op status     Palpation:  Mild TTP at incision site             Special Tests:   NT secondary to post-op status        CMS Impairment/Limitation/Restriction for FOTO Elbow Survey    Therapist reviewed FOTO scores for Felix Stern on 11/11/2022.   FOTO documents entered into [employer] - see Media section.    Limitation Score: See media section  Category: Carrying       Treatment     Treatment Time In: 0840  Treatment Time Out: 0855  Total Treatment time separate from Evaluation: 15 minutes    Felix received therapeutic exercises to develop endurance, ROM, flexibility, and posture for 15 minutes including:  Elbow Flexion ext AROM in brace x30  Elbow Pro/sup in brace x30   Wrist Flx/Ext AROM x30  Lat stretch in brace 4x30"        Home Exercises and Patient Education Provided     Education provided:   - Tissue healing timelines, expected progression of AROM and initiation of strengthening, post-op precautions  - Prognosis, activity modification, goals for therapy, role of therapy for care, exercises/HEP    Written Home Exercises Provided: See patient instructions.  Exercises were reviewed and Felix was able to demonstrate them prior to the end of the session.   Pt received a written copy of exercises to perform at home. Felix demonstrated good  understanding of the education provided.     See EMR under patient instructions for exercises given.     Assessment     Felix is a 42 y.o. male referred to outpatient Physical Therapy with decreased left elbow range of motion, decreased strength, motor " control impairments, pain, and swelling 10 days s/p L distal biceps tendon repair with resultant functional impairments in all occupation tasks and functional ADL's involving use of L UE.       Pt will benefit from skilled outpatient Physical Therapy to address the deficits stated above and in the chart below, provide pt/family education, and to maximize pt's level of independence. Pt prognosis is Excellent.     Plan of care discussed with patient: Yes  Pt's spiritual, cultural and educational needs considered and patient is agreeable to the plan of care and goals as stated below:       Anticipated Barriers for therapy: None      Medical Necessity is demonstrated by the following  History  Co-morbidities and personal factors that may impact the plan of care Co-morbidities:   none    Personal Factors:   no deficits     low   Examination  Body Structures and Functions, activity limitations and participation restrictions that may impact the plan of care Body Regions:   back  upper extremities    Body Systems:    gross symmetry  ROM  strength  gross coordinated movement  transfers  transitions  motor control  motor learning  edema    Participation Restrictions:   None    Activity limitations:   Learning and applying knowledge  No deficit    General Tasks and Commands  No deficit    Communication  No deficit    Mobility  lifting and carrying objects  moving around using equipment (WC)    Self care  No deficit    Domestic Life  No deficit    Interactions/Relationships  No deficit    Life Areas  Occupational tasks    Community and Social Life  No deficit          moderate   Clinical Presentation stable and uncomplicated low   Decision Making/ Complexity Score: low     Goals:  Short Term Goals: 6 weeks  1. Pt will be compliant with HEP 50% of prescribed amount.   2. The pt to demo improvement in Elbow ROM to full compared to uninvolved side.   3.  Pt will tolerate being out of brace for 24 hours without pain  4. Pt will  improve FOTO score to meet or exceed MCID.     Long Term Goals: 26 weeks   Pt will be compliant with % of prescribed amount.   Pt will demo  strength and isometric elbow strength within 90% LSI measured via HHD.   Pt will report ability to perform a full work day without pain or limitation.   Pt will improve FOTO score to meet or exceed predicted outcome.   The pt will report full participation in ADLs and IADLs without restrictions related to L elbow.     Plan   Plan of care Certification: 11/11/2022 to 05/01/2023.    Outpatient Physical Therapy 1-2 times weekly for 26 weeks to include the following interventions: Electrical Stimulation IFC/TENS, Gait Training, Manual Therapy, Moist Heat/ Ice, Neuromuscular Re-ed, Patient Education, Self Care, Therapeutic Activities, and Therapeutic Exercise.     Oren Holcomb, PT , DPT, SCS

## 2022-11-13 DIAGNOSIS — S46.212A RUPTURE OF DISTAL BICEPS TENDON, LEFT, INITIAL ENCOUNTER: ICD-10-CM

## 2022-11-13 DIAGNOSIS — G89.18 POST-OPERATIVE PAIN: ICD-10-CM

## 2022-11-14 NOTE — PROGRESS NOTES
"Chief Complaint:  left elbow pain    HISTORY OF PRESENT ILLNESS:   Pt is here today for post-operative followup of shoulder arthroscopy.  he is doing well.  We have reviewed patient's findings and discussed plan of care and future treatment options.      Tolerating pain well. No longer taking narcotics.   Wearing splint which is in place.  Patient reports that about 3 days ago he was holding a crowbar for counter traction at work with his left arm while he was in a splint and felt a "pop" and pain in his left distal biceps area. He reports that pain has now subsided and he is back at his baseline but he is concerned that he messed something up.     He denies numbness, tingling, paresthesias of the LUE.    DATE OF PROCEDURE: 11/01/2022  SURGEON: Jossy Samayoa M.D.   PROCEDURE PERFORMED:    1. Open repair of Left distal biceps tendon (CPT 69967).       Review of Systems   Constitution: Negative. Negative for chills, fever and night sweats.   HENT: Negative for congestion and headaches.    Eyes: Negative for blurred vision, left vision loss and right vision loss.   Cardiovascular: Negative for chest pain and syncope.   Respiratory: Negative for cough and shortness of breath.    Endocrine: Negative for polydipsia, polyphagia and polyuria.   Hematologic/Lymphatic: Negative for bleeding problem. Does not bruise/bleed easily.   Skin: Negative for dry skin, itching and rash.   Musculoskeletal: Negative for falls and muscle weakness.   Gastrointestinal: Negative for abdominal pain and bowel incontinence.   Genitourinary: Negative for bladder incontinence and nocturia.   Neurological: Negative for disturbances in coordination, loss of balance and seizures.   Psychiatric/Behavioral: Negative for depression. The patient does not have insomnia.    Allergic/Immunologic: Negative for hives and persistent infections.                                                                                 PHYSICAL EXAMINATION:     Incision " sites healed well  No evidence of any erythema, infection or induration  elbow Range of motion Not tested  Minimal effusion  2+ Radial pulses  Mild swelling    + hook test as I could not feel repaired biceps tendon.   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                                                                               ASSESSMENT:                                                                                                                                               1. Status post above, doing well.   2. Left elbow pain  Concern for re-rupture of his distal biceps following new trauma.                                                                                                                               PLAN:                                                                                                                                                     1. MRI to evaluate above concern.   2. Placed in hinged elbow brace set at 90 degrees to full flexion.   Okay to start PT in mean time.   3. I have discussed return to activity in detail. No lifting over 1 pound with surgical arm.  4. Patient will see us back in 4 weeks.                                      5. All questions were answered, surgical technique was reviewed and interpreted, and patient should contact us with any questions or concerns in the interim.

## 2022-11-15 RX ORDER — TRAMADOL HYDROCHLORIDE 50 MG/1
50-100 TABLET ORAL EVERY 6 HOURS PRN
Qty: 21 TABLET | Refills: 0 | Status: SHIPPED | OUTPATIENT
Start: 2022-11-15 | End: 2022-12-01 | Stop reason: ALTCHOICE

## 2022-11-15 RX ORDER — OXYCODONE AND ACETAMINOPHEN 10; 325 MG/1; MG/1
TABLET ORAL
Qty: 15 TABLET | Refills: 0 | Status: SHIPPED | OUTPATIENT
Start: 2022-11-15 | End: 2022-12-01 | Stop reason: ALTCHOICE

## 2022-11-16 ENCOUNTER — CLINICAL SUPPORT (OUTPATIENT)
Dept: REHABILITATION | Facility: HOSPITAL | Age: 42
End: 2022-11-16
Payer: COMMERCIAL

## 2022-11-16 DIAGNOSIS — M25.622 DECREASED RANGE OF MOTION OF LEFT ELBOW: Primary | ICD-10-CM

## 2022-11-16 DIAGNOSIS — R29.898 WEAKNESS OF LEFT UPPER EXTREMITY: ICD-10-CM

## 2022-11-16 DIAGNOSIS — R52 PAIN AGGRAVATED BY LIFTING: ICD-10-CM

## 2022-11-16 PROCEDURE — 97140 MANUAL THERAPY 1/> REGIONS: CPT

## 2022-11-16 PROCEDURE — 97110 THERAPEUTIC EXERCISES: CPT

## 2022-11-16 PROCEDURE — 97112 NEUROMUSCULAR REEDUCATION: CPT

## 2022-11-16 NOTE — PROGRESS NOTES
"  Physical Therapy Daily Treatment Note     Name: Felix Issa [facility]  Clinic Number: [id number]    Therapy Diagnosis:   Encounter Diagnoses   Name Primary?    Decreased range of motion of left elbow Yes    Weakness of left upper extremity     Pain aggravated by lifting      Physician: Marcin Viera III, *    Visit Date: 11/16/2022  Physician Orders: PT Eval and Treat  Medical Diagnosis from Referral: S46.212A (ICD-10-CM) - Rupture of distal biceps tendon, left, initial encounter  Evaluation Date: 11/11/2022  Authorization Period Expiration: 12/31/2022  Plan of Care Expiration: 03/01/2023  Visit # / Visits authorized: 1/ 20 + Eval    Time In: 1000  Time Out: 1054  Total Billable Time: 54 minutes    Precautions: Standard    DOS: 11/1/2022     PROCEDURE PERFORMED:    1. Open repair of Left distal biceps tendon (CPT 76191).     Subjective     Pt reports: His elbow is feeling good. Minimal to no pain. He occasionally locks his brace to give himself a break.     He was compliant with home exercise program.    Pain: 0/10  Location: L Elbow     Objective     Daily Measurements:     NT      Daily Treatment       Felix received therapeutic exercises to develop strength, endurance, ROM, and flexibility for 20 minutes including:  Elbow Flexion ext AROM in brace x30  Elbow Pro/sup in brace x30   Wrist Flx/Ext AROM x30  Lat stretch in brace 4x30"  Prone Ext Brace locked at 30 0 Wt [id number]"        Felix received the following manual therapy techniques: were applied for 8 minutes, including:  Brace assessment/adjustment  L elbow PROM Flx/Ext/Pro/Sup  Lat hold/relax      Felix participated in neuromuscular re-education activities to improve: Coordination, Kinesthetic, Sense, Proprioception, and Motor Control for 26 minutes. The following activities were included:  Supine dynamic Lat mobility 2x10  Prone Lvl 1 LT re-ed 2t44a23"  Prone MT Lvl 1 Re-ed 1w49m91"     Home Exercises and Patient Education Provided     Education " provided:   - Reviewed precautions/HEP    Written Home Exercises Provided: Patient instructed to cont prior HEP.  Exercises were reviewed and Felix was able to demonstrate them prior to the end of the session.  Felix demonstrated good  understanding of the education provided.     See EMR under patient instructions for exercises given.     Assessment     Felix presents today with appropriate ROM and healing s/p L distal biceps tendon repair. Intro'd parascapular re-education for MT and LT which was moderately fatiguing. I reviewed his precautions and HEP which he demonstrated appropriate performance with in clinic today.     Felix Is progressing well towards his goals.     Pt will continue to benefit from skilled outpatient physical therapy to address the deficits listed in the problem list box on initial evaluation, provide pt/family education and to maximize pt's level of independence in the home and community environment. Pt prognosis is Excellent.     Pt's spiritual, cultural and educational needs considered and pt agreeable to plan of care and goals.    Anticipated barriers to physical therapy: None    Goals:  Short Term Goals: 6 weeks  1. Pt will be compliant with HEP 50% of prescribed amount.   2. The pt to demo improvement in Elbow ROM to full compared to uninvolved side.   3.  Pt will tolerate being out of brace for 24 hours without pain  4. Pt will improve FOTO score to meet or exceed MCID.      Long Term Goals: 26 weeks   Pt will be compliant with % of prescribed amount.   Pt will demo  strength and isometric elbow strength within 90% LSI measured via HHD.   Pt will report ability to perform a full work day without pain or limitation.   Pt will improve FOTO score to meet or exceed predicted outcome.   The pt will report full participation in ADLs and IADLs without restrictions related to L elbow.    Plan        Continue with ROM per Dr. Samayoa's post-op protocol.     Oren Holcomb, PT , DPT,  SCS

## 2022-11-22 ENCOUNTER — CLINICAL SUPPORT (OUTPATIENT)
Dept: REHABILITATION | Facility: HOSPITAL | Age: 42
End: 2022-11-22
Payer: COMMERCIAL

## 2022-11-22 DIAGNOSIS — M25.622 DECREASED RANGE OF MOTION OF LEFT ELBOW: Primary | ICD-10-CM

## 2022-11-22 DIAGNOSIS — R52 PAIN AGGRAVATED BY LIFTING: ICD-10-CM

## 2022-11-22 DIAGNOSIS — R29.898 WEAKNESS OF LEFT UPPER EXTREMITY: ICD-10-CM

## 2022-11-22 PROCEDURE — 97110 THERAPEUTIC EXERCISES: CPT

## 2022-11-22 PROCEDURE — 97112 NEUROMUSCULAR REEDUCATION: CPT

## 2022-11-22 PROCEDURE — 97140 MANUAL THERAPY 1/> REGIONS: CPT

## 2022-11-22 NOTE — PROGRESS NOTES
"  Physical Therapy Daily Treatment Note     Name: Felix Issa [facility]  Clinic Number: [id number]    Therapy Diagnosis:   Encounter Diagnoses   Name Primary?    Decreased range of motion of left elbow Yes    Weakness of left upper extremity     Pain aggravated by lifting      Physician: Marcin Viera III, *    Visit Date: 11/22/2022  Physician Orders: PT Eval and Treat  Medical Diagnosis from Referral: S46.212A (ICD-10-CM) - Rupture of distal biceps tendon, left, initial encounter  Evaluation Date: 11/11/2022  Authorization Period Expiration: 12/31/2022  Plan of Care Expiration: 03/01/2023  Visit # / Visits authorized: 2/ 20 + Eval    Time In: 0810 (Pt arrived late)  Time Out: 0903  Total Billable Time: 53 minutes    Precautions: Standard    DOS: 11/1/2022     PROCEDURE PERFORMED:    1. Open repair of Left distal biceps tendon (CPT 86487).     Subjective     Pt reports: No complaints of elbow pain. He is surprised how good he is feeling.     He was compliant with home exercise program.    Pain: 0/10  Location: L Elbow     Objective     Daily Measurements:     NT      Daily Treatment       Felix received therapeutic exercises to develop strength, endurance, ROM, and flexibility for 20 minutes including:  Elbow Flexion/ext AROM in brace w/scap retract 2x20  Elbow Pro/sup in brace x30   Wrist Flx/Ext AROM x30  Prone Ext Brace locked at 30 0 Wt [id number]"        Felix received the following manual therapy techniques: were applied for 8 minutes, including:  Brace assessment/adjustment  L elbow PROM Flx/Ext/Pro/Sup  Lat hold/relax      Felix participated in neuromuscular re-education activities to improve: Coordination, Kinesthetic, Sense, Proprioception, and Motor Control for 25 minutes. The following activities were included:  Supine dynamic Lat mobility 2x10  Prone Lvl 1 LT re-ed [id number]"  Prone MT Lvl 1 Re-ed 4s40u06"     Home Exercises and Patient Education Provided     Education provided:   - Reviewed " precautions/HEP    Written Home Exercises Provided: Patient instructed to cont prior HEP.  Exercises were reviewed and Felix was able to demonstrate them prior to the end of the session.  Felix demonstrated good  understanding of the education provided.     See EMR under patient instructions for exercises given.     Assessment     Felix presents today with appropriate ROM and healing s/p L distal biceps tendon repair. His brace was unlocked to 20 degrees extension and to allow full flexion ROM. He was not able to recall and demo his parascapular re-education exercises in clinic suggesting lack of performance with HEP. I reviewed his HEP with him which he demo'd appropriate technique with once instructed and advised on the importance of adherence to HEP.     Felix Is progressing well towards his goals.     Pt will continue to benefit from skilled outpatient physical therapy to address the deficits listed in the problem list box on initial evaluation, provide pt/family education and to maximize pt's level of independence in the home and community environment. Pt prognosis is Excellent.     Pt's spiritual, cultural and educational needs considered and pt agreeable to plan of care and goals.    Anticipated barriers to physical therapy: None    Goals:  Short Term Goals: 6 weeks  1. Pt will be compliant with HEP 50% of prescribed amount.   2. The pt to demo improvement in Elbow ROM to full compared to uninvolved side.   3.  Pt will tolerate being out of brace for 24 hours without pain  4. Pt will improve FOTO score to meet or exceed MCID.      Long Term Goals: 26 weeks   Pt will be compliant with % of prescribed amount.   Pt will demo  strength and isometric elbow strength within 90% LSI measured via HHD.   Pt will report ability to perform a full work day without pain or limitation.   Pt will improve FOTO score to meet or exceed predicted outcome.   The pt will report full participation in ADLs and IADLs  without restrictions related to L elbow.    Plan        Continue with ROM per Dr. Samayoa's post-op protocol.     Oren Holcomb, PT , DPT, SCS

## 2022-11-24 DIAGNOSIS — G89.18 POST-OPERATIVE PAIN: ICD-10-CM

## 2022-11-24 DIAGNOSIS — S46.212A RUPTURE OF DISTAL BICEPS TENDON, LEFT, INITIAL ENCOUNTER: ICD-10-CM

## 2022-11-24 RX ORDER — NAPROXEN 500 MG/1
500 TABLET ORAL EVERY 12 HOURS
Qty: 42 TABLET | Refills: 0 | Status: CANCELLED | OUTPATIENT
Start: 2022-11-24 | End: 2022-12-15

## 2022-11-25 DIAGNOSIS — S46.212A RUPTURE OF DISTAL BICEPS TENDON, LEFT, INITIAL ENCOUNTER: ICD-10-CM

## 2022-11-25 DIAGNOSIS — G89.18 POST-OPERATIVE PAIN: ICD-10-CM

## 2022-11-26 RX ORDER — NAPROXEN 500 MG/1
500 TABLET ORAL EVERY 12 HOURS
Qty: 42 TABLET | Refills: 0 | OUTPATIENT
Start: 2022-11-26 | End: 2022-12-17

## 2022-11-30 ENCOUNTER — CLINICAL SUPPORT (OUTPATIENT)
Dept: REHABILITATION | Facility: HOSPITAL | Age: 42
End: 2022-11-30
Payer: COMMERCIAL

## 2022-11-30 ENCOUNTER — HOSPITAL ENCOUNTER (OUTPATIENT)
Dept: RADIOLOGY | Facility: OTHER | Age: 42
Discharge: HOME | End: 2022-11-30
Attending: PHYSICIAN ASSISTANT
Payer: COMMERCIAL

## 2022-11-30 ENCOUNTER — PATIENT MESSAGE (OUTPATIENT)
Dept: REHABILITATION | Facility: HOSPITAL | Age: 42
End: 2022-11-30

## 2022-11-30 DIAGNOSIS — M25.622 DECREASED RANGE OF MOTION OF LEFT ELBOW: Primary | ICD-10-CM

## 2022-11-30 DIAGNOSIS — S46.212A RUPTURE OF LEFT DISTAL BICEPS TENDON, INITIAL ENCOUNTER: ICD-10-CM

## 2022-11-30 DIAGNOSIS — M25.522 ELBOW PAIN, LEFT: ICD-10-CM

## 2022-11-30 DIAGNOSIS — R29.898 WEAKNESS OF LEFT UPPER EXTREMITY: ICD-10-CM

## 2022-11-30 DIAGNOSIS — R52 PAIN AGGRAVATED BY LIFTING: ICD-10-CM

## 2022-11-30 PROCEDURE — 73221 MRI ELBOW WITHOUT CONTRAST LEFT: ICD-10-PCS | Mod: 26,LT,, | Performed by: RADIOLOGY

## 2022-11-30 PROCEDURE — 73221 MRI JOINT UPR EXTREM W/O DYE: CPT | Mod: 26,LT,, | Performed by: RADIOLOGY

## 2022-11-30 PROCEDURE — 97110 THERAPEUTIC EXERCISES: CPT | Mod: CQ

## 2022-11-30 PROCEDURE — 97112 NEUROMUSCULAR REEDUCATION: CPT | Mod: CQ

## 2022-11-30 PROCEDURE — 97140 MANUAL THERAPY 1/> REGIONS: CPT | Mod: CQ

## 2022-11-30 PROCEDURE — 73221 MRI JOINT UPR EXTREM W/O DYE: CPT | Mod: TC,LT

## 2022-11-30 NOTE — PROGRESS NOTES
"  Physical Therapy Daily Treatment Note     Name: Felix Issa [facility]  Clinic Number: [id number]    Therapy Diagnosis:   Encounter Diagnoses   Name Primary?    Decreased range of motion of left elbow Yes    Weakness of left upper extremity     Pain aggravated by lifting      Physician: Marcin Viera III, *    Visit Date: 11/30/2022  Physician Orders: PT Eval and Treat  Medical Diagnosis from Referral: S46.212A (ICD-10-CM) - Rupture of distal biceps tendon, left, initial encounter  Evaluation Date: 11/11/2022  Authorization Period Expiration: 12/31/2022  Plan of Care Expiration: 03/01/2023  Visit # / Visits authorized: 3/ 20 + Eval    Time In: 0810 (Pt arrived late)  Time Out: 0905  Total Billable Time: 50 minutes    Precautions: Standard    DOS: 11/1/2022     PROCEDURE PERFORMED:    1. Open repair of Left distal biceps tendon (CPT 01275).     Subjective     Pt reports: elbow is doing good. Have MRI tonight to make sure surgical site is fine     He was compliant with home exercise program.    Pain: 0/10  Location: L Elbow     Objective     Daily Measurements:     NT      Daily Treatment       Felix received therapeutic exercises to develop strength, endurance, ROM, and flexibility for 22 minutes including:  Elbow Flexion/ext AROM in brace w/scap retract 2x20  Elbow Pro/sup in brace x30   Wrist Flx/Ext AROM x30  Prone Ext Brace locked at 30 0 Wt 2l58y21"   LAT stretch sit back EOT w/ stick 10x10"    Felix received the following manual therapy techniques: were applied for 8 minutes, including:  Brace assessment/adjustment  L elbow PROM Flx/Ext/Pro/Sup        Felix participated in neuromuscular re-education activities to improve: Coordination, Kinesthetic, Sense, Proprioception, and Motor Control for 20 minutes. The following activities were included:    Prone Lvl 2 LT re-ed 3x10x5"  Prone MT Lvl 2 Re-ed 3x10x5"   90/90 rested on table ER 30x    Home Exercises and Patient Education Provided     Education provided: "   - Reviewed precautions/HEP    Written Home Exercises Provided: Patient instructed to cont prior HEP.  Exercises were reviewed and Felix was able to demonstrate them prior to the end of the session.  Felix demonstrated good  understanding of the education provided.     See EMR under patient instructions for exercises given.     Assessment     Progressed brace to 10 degrees of ext. Can ext elbow to 10 degrees w/o soft tissue restrictions. Good LT and MT motor control w/ prone scapular strengthening. Restricted P/AROM supination.     Felix Is progressing well towards his goals.     Pt will continue to benefit from skilled outpatient physical therapy to address the deficits listed in the problem list box on initial evaluation, provide pt/family education and to maximize pt's level of independence in the home and community environment. Pt prognosis is Excellent.     Pt's spiritual, cultural and educational needs considered and pt agreeable to plan of care and goals.    Anticipated barriers to physical therapy: None    Goals:  Short Term Goals: 6 weeks  1. Pt will be compliant with HEP 50% of prescribed amount.   2. The pt to demo improvement in Elbow ROM to full compared to uninvolved side.   3.  Pt will tolerate being out of brace for 24 hours without pain  4. Pt will improve FOTO score to meet or exceed MCID.      Long Term Goals: 26 weeks   Pt will be compliant with % of prescribed amount.   Pt will demo  strength and isometric elbow strength within 90% LSI measured via HHD.   Pt will report ability to perform a full work day without pain or limitation.   Pt will improve FOTO score to meet or exceed predicted outcome.   The pt will report full participation in ADLs and IADLs without restrictions related to L elbow.    Plan        Continue with ROM per Dr. Samayoa's post-op protocol.     Merly Dahl PTA ,

## 2022-12-01 ENCOUNTER — PATIENT MESSAGE (OUTPATIENT)
Dept: INTERNAL MEDICINE | Facility: CLINIC | Age: 42
End: 2022-12-01
Payer: COMMERCIAL

## 2022-12-01 ENCOUNTER — TELEPHONE (OUTPATIENT)
Dept: SPORTS MEDICINE | Facility: CLINIC | Age: 42
End: 2022-12-01
Payer: COMMERCIAL

## 2022-12-01 DIAGNOSIS — S46.212A BICEPS RUPTURE, DISTAL, LEFT, INITIAL ENCOUNTER: ICD-10-CM

## 2022-12-01 DIAGNOSIS — S46.212A RUPTURE OF LEFT DISTAL BICEPS TENDON, INITIAL ENCOUNTER: Primary | ICD-10-CM

## 2022-12-01 DIAGNOSIS — S46.212A RUPTURE OF LEFT DISTAL BICEPS TENDON: Primary | ICD-10-CM

## 2022-12-01 DIAGNOSIS — G89.18 POST-OPERATIVE PAIN: ICD-10-CM

## 2022-12-01 RX ORDER — NAPROXEN 500 MG/1
500 TABLET ORAL EVERY 12 HOURS
Qty: 42 TABLET | Refills: 0 | Status: SHIPPED | OUTPATIENT
Start: 2022-12-01 | End: 2023-01-12 | Stop reason: SDUPTHER

## 2022-12-01 RX ORDER — PREGABALIN 75 MG/1
75 CAPSULE ORAL
Status: CANCELLED | OUTPATIENT
Start: 2022-12-01 | End: 2022-12-01

## 2022-12-01 RX ORDER — PROMETHAZINE HYDROCHLORIDE 25 MG/1
25 TABLET ORAL EVERY 6 HOURS PRN
Qty: 6 TABLET | Refills: 0 | Status: SHIPPED | OUTPATIENT
Start: 2022-12-01 | End: 2023-01-14 | Stop reason: ALTCHOICE

## 2022-12-01 RX ORDER — SODIUM CHLORIDE 9 MG/ML
INJECTION, SOLUTION INTRAVENOUS CONTINUOUS
Status: CANCELLED | OUTPATIENT
Start: 2022-12-01

## 2022-12-01 RX ORDER — OXYCODONE AND ACETAMINOPHEN 10; 325 MG/1; MG/1
TABLET ORAL
Qty: 21 TABLET | Refills: 0 | Status: SHIPPED | OUTPATIENT
Start: 2022-12-01 | End: 2022-12-19 | Stop reason: SDUPTHER

## 2022-12-01 RX ORDER — OXYCODONE HCL 10 MG/1
10 TABLET, FILM COATED, EXTENDED RELEASE ORAL
Status: CANCELLED | OUTPATIENT
Start: 2022-12-01 | End: 2022-12-01

## 2022-12-01 RX ORDER — TRAMADOL HYDROCHLORIDE 50 MG/1
50-100 TABLET ORAL EVERY 6 HOURS PRN
Qty: 21 TABLET | Refills: 0 | Status: SHIPPED | OUTPATIENT
Start: 2022-12-01 | End: 2022-12-19 | Stop reason: SDUPTHER

## 2022-12-01 RX ORDER — INDOMETHACIN 25 MG/1
75 CAPSULE ORAL DAILY
Qty: 12 CAPSULE | Refills: 0 | Status: SHIPPED | OUTPATIENT
Start: 2022-12-01 | End: 2023-01-14 | Stop reason: ALTCHOICE

## 2022-12-01 RX ORDER — CLINDAMYCIN PHOSPHATE 900 MG/50ML
900 INJECTION, SOLUTION INTRAVENOUS
Status: CANCELLED | OUTPATIENT
Start: 2022-12-01

## 2022-12-01 NOTE — H&P (VIEW-ONLY)
Felix Garciafahadmonika  is here for a completion of his perioperative paperwork. he  Is scheduled to undergo     Left   Revision distal biceps reconstruction with possible allograft augmentation.  on 11/8/22.      He is a healthy individual but does need clearance for this procedure which he will get from [facility].     PAST MEDICAL HISTORY:   Past Medical History:   Diagnosis Date    GERD (gastroesophageal reflux disease)      PAST SURGICAL HISTORY:   Past Surgical History:   Procedure Laterality Date    HAND SURGERY      VASECTOMY       FAMILY HISTORY:   Family History   Problem Relation Age of Onset    Colon cancer Neg Hx     Esophageal cancer Neg Hx     Stomach cancer Neg Hx     Rectal cancer Neg Hx      SOCIAL HISTORY:   Social History     Socioeconomic History    Marital status:    Tobacco Use    Smoking status: Never    Smokeless tobacco: Never   Substance and Sexual Activity    Alcohol use: Yes     Comment: occasionally    Drug use: No       MEDICATIONS:   Current Outpatient Medications:     acyclovir (ZOVIRAX) 400 MG tablet, , Disp: , Rfl:     amoxicillin (AMOXIL) 250 MG capsule, Take 250 mg by mouth every 8 (eight) hours., Disp: , Rfl:     aspirin (ECOTRIN) 81 MG EC tablet, Take 1 tablet (81 mg total) by mouth once daily. For 4 weeks starting the day after surgery., Disp: 28 tablet, Rfl: 0    citalopram (CELEXA) 20 MG tablet, Take 20 mg by mouth once daily., Disp: , Rfl:     diazePAM (VALIUM) 5 MG tablet, Take 1 tablet (5 mg total) by mouth once. Take all 2-3 pills at once 30-45 minutes prior to procedure. for 1 dose (Patient not taking: Reported on 10/28/2022), Disp: 3 tablet, Rfl: 0    esomeprazole magnesium (NEXIUM) 10 mg suspension, Take 10 mg by mouth before breakfast., Disp: , Rfl:     famotidine (PEPCID) 10 MG tablet, Take 10 mg by mouth 2 (two) times daily., Disp: , Rfl:     indomethacin (INDOCIN) 25 MG capsule, Take 3 capsules (75 mg total) by mouth once daily. Take with food.  Take on post-op days 1,2,3,&4., Disp: 12 capsule, Rfl: 0    meloxicam (MOBIC) 15 MG tablet, TAKE 1 TABLET BY MOUTH DAILY, Disp: 30 tablet, Rfl: 1    methocarbamol (ROBAXIN) 750 MG Tab, TAKE 1 TABLET BY MOUTH NIGHTLY AS NEEDED FOR SPASMS, Disp: 40 tablet, Rfl: 0    multivitamin (THERAGRAN) per tablet, Take 1 tablet by mouth once daily., Disp: , Rfl:     oxyCODONE-acetaminophen (PERCOCET)  mg per tablet, Take 1 tablet by mouth every 4-6 hours as needed for pain. Take stool softener with this medication., Disp: 15 tablet, Rfl: 0    pantoprazole (PROTONIX) 40 MG tablet, Take 40 mg by mouth once daily., Disp: , Rfl:     promethazine (PHENERGAN) 25 MG tablet, Take 1 tablet (25 mg total) by mouth every 6 (six) hours as needed for Nausea. (Patient not taking: Reported on 11/11/2022), Disp: 8 tablet, Rfl: 0    ranitidine (ZANTAC) 15 mg/mL syrup, Take by mouth every 12 (twelve) hours., Disp: , Rfl:     traMADoL (ULTRAM) 50 mg tablet, Take 1-2 tablets ( mg total) by mouth every 6 (six) hours as needed for Pain., Disp: 21 tablet, Rfl: 0    VENTOLIN HFA 90 mcg/actuation inhaler, , Disp: , Rfl:   ALLERGIES: Review of patient's allergies indicates:  No Known Allergies    VITAL SIGNS: There were no vitals taken for this visit.     Risks, indications and benefits of the surgical procedure were discussed with the patient. All questions with regard to surgery, rehab, expected return to functional activities, activities of daily living and recreational endeavors were answered to his satisfaction.    It was explained to the patient that there may be an increase in surgical risks if the patient has certain co-morbidities such as but not limited to: Obesity, Cardiovascular issues (CHF, CAD, Arrhythmias), chronic pulmonary issues, previous or current neurovascular/neurological issues, previous strokes, diabetes mellitus, previous wound healing issues, previous wound or skin infections, PVD, clotting disorders, if the patient  uses chronic steroids, if the patient takes or has immune compromising medications or diseases, or has previously or currently used tobacco products.     The patient verbalized that he/she does not have any additional clotting, bleeding, or blood disorders, other than what is list in her chart on today's review.     Then a brief history and physical exam were performed.    Review of Systems   Constitution: Negative. Negative for chills, fever and night sweats.   HENT: Negative for congestion and headaches.    Eyes: Negative for blurred vision, left vision loss and right vision loss.   Cardiovascular: Negative for chest pain and syncope.   Respiratory: Negative for cough and shortness of breath.    Endocrine: Negative for polydipsia, polyphagia and polyuria.   Hematologic/Lymphatic: Negative for bleeding problem. Does not bruise/bleed easily.   Skin: Negative for dry skin, itching and rash.   Musculoskeletal: Negative for falls and muscle weakness.   Gastrointestinal: Negative for abdominal pain and bowel incontinence.   Genitourinary: Negative for bladder incontinence and nocturia.   Neurological: Negative for disturbances in coordination, loss of balance and seizures.   Psychiatric/Behavioral: Negative for depression. The patient does not have insomnia.    Allergic/Immunologic: Negative for hives and persistent infections.     PHYSICAL EXAM:  GEN: A&Ox3, WD WN NAD  HEENT: WNL  CHEST: CTAB, no W/R/R  HEART: RRR, no M/R/G  ABD: Soft, NT ND, BS x4 QUADS  MS; See Epic  NEURO: CN II-XII intact       The surgical consent was then reviewed with the patient, who agreed with all the contents of the consent form and it was signed. he was then given the surgery packet to bring with him to [facility] for the anesthesia portion of his perioperative paperwork (if needed)   For all physicians except for Dr. Grove, we will email and possibly fax the consent forms and booking sheets to [facility].    The  patient was given the opportunity to ask questions about the surgical plan and consent form, and once no other questions were asked, I proceeded with the pre-op appointment.    PHYSICAL THERAPY:  He was also instructed regarding physical therapy and will begin on  POD10. He was given a copy of the original prescription to schedule. Another copy of this prescription was also faxed to [facility].    POST OP CARE:instructions were reviewed including care of the wound and dressing after surgery and when he can shower. Patient was told not sleep or lay on there surgical extremity following surgery as this could cause repair damage, tissue damage, or nerve injury.    An extensive amount of time was spent on discussion of the following information based on what type of surgery the patient was having. Patient expressed understanding of the material below:    Shoulder surgery or upper extremity surgery requiring post-op sling:  It was explained to the patient that they should remove their arm from the sling approximately 6 times per day to do full elbow ROM (flexion and extension) and full supination and pronation of the elbow for approximately 5 minutes at a time to help prevent elbow stiffness, nerve pain or problems, or nerve injury. They were told to contact us if they begin having numbness and tingling of there surgical extremity that persists longer then 1 day without relief.     Extremity surgery requiring a splint:   It was explained to patient on how to properly elevated position there extremity to prevent pressure ulcers from occurring. I made sure that the patient understood that that surgical site may be numb following surgery and prevent them from feeling pressure pain that they would normal feel if a pressure injury was occurring. Pressure ulcers and there causes were discussed with the patient today.     Post-operative splint:  It was explain to the patient that they can contact us at anytime if they feel that  there is a problem with their splint or under their splint that needs evaluation. If there is concern, questions, or discomfort with the splint then they can present to either our clinic or the [facility] ED for removal, evaluation, and replacement of the splint.    CRUTCHES OR WALKER: It was explained to the patient that if they are having a lower extremity surgery that they will require either a walker or crutches to ambulate safely with after surgery. It was explained that a cane or other assistive devices are not sufficient to safely ambulate with after surgery. I explained to the patient that I will place an order for them to receive either crutches or a walker after surgery to go home with. It was explained that if they have crutches or a walker at home already, that they are REQUIRED to bring them to the hospital on the day of surgery. It was explained that if they do not have them at the hospital on the day of surgery that they WILL be provided a new pair or crutches or a walker to go home with to ensure ambulation will be safe if the patient needs to stop somewhere on the way home.      PAIN MANAGEMENT: Felix Stern was also given a pain management regime, which includes the option of getting a TENS unit given to him by [facility] along with the education required for its use. He was also instructed regarding the Polar ice unit or gel ice packs that will be in place after surgery and his postoperative pain medications.     PAIN MEDICATION:  Percocet 10/325mg 1 po q 4-6 hours prn pain  Ultram 50 mg Take 1-2 p.o. q.6 hours p.r.n. breakthrough pain,   Phenergan 25 mg one p.o. q.6 hours p.r.n. nausea and vomiting.    Indocin 75mg po x 4 days  Naproxen 500mg po BID x 21 days (starting on POD5)  Prilosec 40mg x 25 days for stomach protection    DVT prophylaxis was discussed with the patient today including risk factors for developing DVTs and history of DVTs. The patient was asked if any  specific recommendations were given from the doctor/s that did pre-operative surgical clearance. The patient was then given an education sheet about DVTs and PE with warning signs and symptoms of both and steps to take if they suspect either of these.    This along with the Modified Caprini risk assessment model for VTE in general surgical patients was used to determine the patient's DVT risk.     From: Laure VILLALTA, Olman DA, Lou SM, et al. Prevention of VTE in nonorthopedic surgical patients: antithrombotic therapy and prevention of thrombosis, 9th ed: American College of Chest [employer]icians evidence-based clinical practical guidelines. Chest 2012; 141:e227S. Copyright © 2012. Reproduced with permission from the American College of Chest Physicians.    The below listed DVT prophylaxis regimen along with bilateral ABRAHAM compression stockings will be used post-op. Length of treatment has been determined to be 10-42 days post-op by the above noted Caprini assessment model.     The patient was instructed to buy and take:  Aspirin 81mg QD x 4 weeks for DVT prophylaxis starting on the morning after surgery.  Patient will also use bilateral TEDs on lower extremities, SCDs during surgery, and early ambulation post-op. If the patient was previously taking 81mg baby aspirin, they were told to not take it starting 5 days prior to surgery and to restart the 81mg aspirin after surgery.       Patient was also told to buy over the counter Prilosec medication if needed and take it once daily for GI protection as long as they are taking NSAIDs or Aspirin.   Patient denies history of seizures.      The patient was told that narcotic pain medications may make them drowsy and instructions were given to not sign legal documents, drive or operate heavy machinery, cars, or equipment while under the influence of narcotic medications. The patient was told and understands that narcotic pain medications should only be used as needed to control  pain and that other options of pain control include TENs unit and ice packs/unit.     As there were no other questions to be asked, he was given my business card along with Jossy Samayoa MD business card if he has any questions or concerns prior to surgery or in the postop period.     At the end of our encounter today, the patient was given the option and asked if they would like to see the surgeon regarding questions or concerns that they have about the consent form or the surgical procedure. The patient declined the need to speak with Dr. Samayoa for further discussion of MRI or surgery. .

## 2022-12-01 NOTE — TELEPHONE ENCOUNTER
MRI of left elbow reviewed and interpreted by myself which showed:  Postoperative change of biceps tendon repair.  There is a complete retear of the repaired tendon with approximately 4-5 cm of proximal retraction and a fluid-filled tendon gap.  Questionable small tendon stump at the postoperative bed.     Reviewed imaging and plan with Dr. Samayoa.    PLAN:   Treatment options were discussed with the patient about their injury today.  I reviewed the MRI images and relevant anatomy with the patient and what this means for the injured left distal biceps tendon    We discussed both non-operative and operative options for the patient's distal biceps tendon and the risks and benefits of each.    I feel like he would benefit from Revision distal biceps repair.  After a discussion of specific risks and benefits, he has made the decision to proceed with surgery.      These risks include but are not limited to bleeding, infection, scarring, damage to neurovascular structures, damage to cartilage, stiffness, blood clots, pulmonary embolism, swelling, compartment syndrome, need for further surgery, and the risks of anesthesia.    he verbalized her understanding of these risks and wished to proceed with surgery.    We also had a detailed discussion of her expectation level for this procedure as well as any limitations at home or work and with exercising following surgery.     The spectrum of treatment options were discussed with the patient, including nonoperative and operative options.  After thorough discussion, the patient has elected to undergo surgical treatment to include:    Left   Revision distal biceps reconstruction with possible allograft augmentation.     We will get the patient scheduled for this at the patient's convenience around their work schedule.    The patient was given the opportunity to ask questions and all questions were answered, pt will contact us for questions or concerns in the interim.    Told patient  to reach out to primary care to update his surgery clearance. Will do pre-op morning of.

## 2022-12-01 NOTE — H&P
Felix Garciafahadmonika  is here for a completion of his perioperative paperwork. he  Is scheduled to undergo     Left   Revision distal biceps reconstruction with possible allograft augmentation.  on 11/8/22.      He is a healthy individual but does need clearance for this procedure which he will get from [facility].     PAST MEDICAL HISTORY:   Past Medical History:   Diagnosis Date    GERD (gastroesophageal reflux disease)      PAST SURGICAL HISTORY:   Past Surgical History:   Procedure Laterality Date    HAND SURGERY      VASECTOMY       FAMILY HISTORY:   Family History   Problem Relation Age of Onset    Colon cancer Neg Hx     Esophageal cancer Neg Hx     Stomach cancer Neg Hx     Rectal cancer Neg Hx      SOCIAL HISTORY:   Social History     Socioeconomic History    Marital status:    Tobacco Use    Smoking status: Never    Smokeless tobacco: Never   Substance and Sexual Activity    Alcohol use: Yes     Comment: occasionally    Drug use: No       MEDICATIONS:   Current Outpatient Medications:     acyclovir (ZOVIRAX) 400 MG tablet, , Disp: , Rfl:     amoxicillin (AMOXIL) 250 MG capsule, Take 250 mg by mouth every 8 (eight) hours., Disp: , Rfl:     aspirin (ECOTRIN) 81 MG EC tablet, Take 1 tablet (81 mg total) by mouth once daily. For 4 weeks starting the day after surgery., Disp: 28 tablet, Rfl: 0    citalopram (CELEXA) 20 MG tablet, Take 20 mg by mouth once daily., Disp: , Rfl:     diazePAM (VALIUM) 5 MG tablet, Take 1 tablet (5 mg total) by mouth once. Take all 2-3 pills at once 30-45 minutes prior to procedure. for 1 dose (Patient not taking: Reported on 10/28/2022), Disp: 3 tablet, Rfl: 0    esomeprazole magnesium (NEXIUM) 10 mg suspension, Take 10 mg by mouth before breakfast., Disp: , Rfl:     famotidine (PEPCID) 10 MG tablet, Take 10 mg by mouth 2 (two) times daily., Disp: , Rfl:     indomethacin (INDOCIN) 25 MG capsule, Take 3 capsules (75 mg total) by mouth once daily. Take with food.  Take on post-op days 1,2,3,&4., Disp: 12 capsule, Rfl: 0    meloxicam (MOBIC) 15 MG tablet, TAKE 1 TABLET BY MOUTH DAILY, Disp: 30 tablet, Rfl: 1    methocarbamol (ROBAXIN) 750 MG Tab, TAKE 1 TABLET BY MOUTH NIGHTLY AS NEEDED FOR SPASMS, Disp: 40 tablet, Rfl: 0    multivitamin (THERAGRAN) per tablet, Take 1 tablet by mouth once daily., Disp: , Rfl:     oxyCODONE-acetaminophen (PERCOCET)  mg per tablet, Take 1 tablet by mouth every 4-6 hours as needed for pain. Take stool softener with this medication., Disp: 15 tablet, Rfl: 0    pantoprazole (PROTONIX) 40 MG tablet, Take 40 mg by mouth once daily., Disp: , Rfl:     promethazine (PHENERGAN) 25 MG tablet, Take 1 tablet (25 mg total) by mouth every 6 (six) hours as needed for Nausea. (Patient not taking: Reported on 11/11/2022), Disp: 8 tablet, Rfl: 0    ranitidine (ZANTAC) 15 mg/mL syrup, Take by mouth every 12 (twelve) hours., Disp: , Rfl:     traMADoL (ULTRAM) 50 mg tablet, Take 1-2 tablets ( mg total) by mouth every 6 (six) hours as needed for Pain., Disp: 21 tablet, Rfl: 0    VENTOLIN HFA 90 mcg/actuation inhaler, , Disp: , Rfl:   ALLERGIES: Review of patient's allergies indicates:  No Known Allergies    VITAL SIGNS: There were no vitals taken for this visit.     Risks, indications and benefits of the surgical procedure were discussed with the patient. All questions with regard to surgery, rehab, expected return to functional activities, activities of daily living and recreational endeavors were answered to his satisfaction.    It was explained to the patient that there may be an increase in surgical risks if the patient has certain co-morbidities such as but not limited to: Obesity, Cardiovascular issues (CHF, CAD, Arrhythmias), chronic pulmonary issues, previous or current neurovascular/neurological issues, previous strokes, diabetes mellitus, previous wound healing issues, previous wound or skin infections, PVD, clotting disorders, if the patient  uses chronic steroids, if the patient takes or has immune compromising medications or diseases, or has previously or currently used tobacco products.     The patient verbalized that he/she does not have any additional clotting, bleeding, or blood disorders, other than what is list in her chart on today's review.     Then a brief history and physical exam were performed.    Review of Systems   Constitution: Negative. Negative for chills, fever and night sweats.   HENT: Negative for congestion and headaches.    Eyes: Negative for blurred vision, left vision loss and right vision loss.   Cardiovascular: Negative for chest pain and syncope.   Respiratory: Negative for cough and shortness of breath.    Endocrine: Negative for polydipsia, polyphagia and polyuria.   Hematologic/Lymphatic: Negative for bleeding problem. Does not bruise/bleed easily.   Skin: Negative for dry skin, itching and rash.   Musculoskeletal: Negative for falls and muscle weakness.   Gastrointestinal: Negative for abdominal pain and bowel incontinence.   Genitourinary: Negative for bladder incontinence and nocturia.   Neurological: Negative for disturbances in coordination, loss of balance and seizures.   Psychiatric/Behavioral: Negative for depression. The patient does not have insomnia.    Allergic/Immunologic: Negative for hives and persistent infections.     PHYSICAL EXAM:  GEN: A&Ox3, WD WN NAD  HEENT: WNL  CHEST: CTAB, no W/R/R  HEART: RRR, no M/R/G  ABD: Soft, NT ND, BS x4 QUADS  MS; See Epic  NEURO: CN II-XII intact       The surgical consent was then reviewed with the patient, who agreed with all the contents of the consent form and it was signed. he was then given the surgery packet to bring with him to [facility] for the anesthesia portion of his perioperative paperwork (if needed)   For all physicians except for Dr. Grove, we will email and possibly fax the consent forms and booking sheets to [facility].    The  patient was given the opportunity to ask questions about the surgical plan and consent form, and once no other questions were asked, I proceeded with the pre-op appointment.    PHYSICAL THERAPY:  He was also instructed regarding physical therapy and will begin on  POD10. He was given a copy of the original prescription to schedule. Another copy of this prescription was also faxed to [facility].    POST OP CARE:instructions were reviewed including care of the wound and dressing after surgery and when he can shower. Patient was told not sleep or lay on there surgical extremity following surgery as this could cause repair damage, tissue damage, or nerve injury.    An extensive amount of time was spent on discussion of the following information based on what type of surgery the patient was having. Patient expressed understanding of the material below:    Shoulder surgery or upper extremity surgery requiring post-op sling:  It was explained to the patient that they should remove their arm from the sling approximately 6 times per day to do full elbow ROM (flexion and extension) and full supination and pronation of the elbow for approximately 5 minutes at a time to help prevent elbow stiffness, nerve pain or problems, or nerve injury. They were told to contact us if they begin having numbness and tingling of there surgical extremity that persists longer then 1 day without relief.     Extremity surgery requiring a splint:   It was explained to patient on how to properly elevated position there extremity to prevent pressure ulcers from occurring. I made sure that the patient understood that that surgical site may be numb following surgery and prevent them from feeling pressure pain that they would normal feel if a pressure injury was occurring. Pressure ulcers and there causes were discussed with the patient today.     Post-operative splint:  It was explain to the patient that they can contact us at anytime if they feel that  there is a problem with their splint or under their splint that needs evaluation. If there is concern, questions, or discomfort with the splint then they can present to either our clinic or the [facility] ED for removal, evaluation, and replacement of the splint.    CRUTCHES OR WALKER: It was explained to the patient that if they are having a lower extremity surgery that they will require either a walker or crutches to ambulate safely with after surgery. It was explained that a cane or other assistive devices are not sufficient to safely ambulate with after surgery. I explained to the patient that I will place an order for them to receive either crutches or a walker after surgery to go home with. It was explained that if they have crutches or a walker at home already, that they are REQUIRED to bring them to the hospital on the day of surgery. It was explained that if they do not have them at the hospital on the day of surgery that they WILL be provided a new pair or crutches or a walker to go home with to ensure ambulation will be safe if the patient needs to stop somewhere on the way home.      PAIN MANAGEMENT: Felix Stern was also given a pain management regime, which includes the option of getting a TENS unit given to him by [facility] along with the education required for its use. He was also instructed regarding the Polar ice unit or gel ice packs that will be in place after surgery and his postoperative pain medications.     PAIN MEDICATION:  Percocet 10/325mg 1 po q 4-6 hours prn pain  Ultram 50 mg Take 1-2 p.o. q.6 hours p.r.n. breakthrough pain,   Phenergan 25 mg one p.o. q.6 hours p.r.n. nausea and vomiting.    Indocin 75mg po x 4 days  Naproxen 500mg po BID x 21 days (starting on POD5)  Prilosec 40mg x 25 days for stomach protection    DVT prophylaxis was discussed with the patient today including risk factors for developing DVTs and history of DVTs. The patient was asked if any  specific recommendations were given from the doctor/s that did pre-operative surgical clearance. The patient was then given an education sheet about DVTs and PE with warning signs and symptoms of both and steps to take if they suspect either of these.    This along with the Modified Caprini risk assessment model for VTE in general surgical patients was used to determine the patient's DVT risk.     From: Laure VILLALTA, Olman DA, Lou SM, et al. Prevention of VTE in nonorthopedic surgical patients: antithrombotic therapy and prevention of thrombosis, 9th ed: American College of Chest Physicians evidence-based clinical practical guidelines. Chest 2012; 141:e227S. Copyright © 2012. Reproduced with permission from the American College of Chest Physicians.    The below listed DVT prophylaxis regimen along with bilateral ABRAHAM compression stockings will be used post-op. Length of treatment has been determined to be 10-42 days post-op by the above noted Caprini assessment model.     The patient was instructed to buy and take:  Aspirin 81mg QD x 4 weeks for DVT prophylaxis starting on the morning after surgery.  Patient will also use bilateral TEDs on lower extremities, SCDs during surgery, and early ambulation post-op. If the patient was previously taking 81mg baby aspirin, they were told to not take it starting 5 days prior to surgery and to restart the 81mg aspirin after surgery.       Patient was also told to buy over the counter Prilosec medication if needed and take it once daily for GI protection as long as they are taking NSAIDs or Aspirin.   Patient denies history of seizures.      The patient was told that narcotic pain medications may make them drowsy and instructions were given to not sign legal documents, drive or operate heavy machinery, cars, or equipment while under the influence of narcotic medications. The patient was told and understands that narcotic pain medications should only be used as needed to control  pain and that other options of pain control include TENs unit and ice packs/unit.     As there were no other questions to be asked, he was given my business card along with Jossy Samayoa MD business card if he has any questions or concerns prior to surgery or in the postop period.     At the end of our encounter today, the patient was given the option and asked if they would like to see the surgeon regarding questions or concerns that they have about the consent form or the surgical procedure. The patient declined the need to speak with Dr. Samayoa for further discussion of MRI or surgery. .

## 2022-12-02 ENCOUNTER — ANESTHESIA EVENT (OUTPATIENT)
Dept: SURGERY | Facility: HOSPITAL | Age: 42
End: 2022-12-02
Payer: COMMERCIAL

## 2022-12-02 ENCOUNTER — TELEPHONE (OUTPATIENT)
Dept: SPORTS MEDICINE | Facility: CLINIC | Age: 42
End: 2022-12-02
Payer: COMMERCIAL

## 2022-12-02 ENCOUNTER — PATIENT MESSAGE (OUTPATIENT)
Dept: PREADMISSION TESTING | Facility: HOSPITAL | Age: 42
End: 2022-12-02
Payer: COMMERCIAL

## 2022-12-02 ENCOUNTER — HOSPITAL ENCOUNTER (OUTPATIENT)
Dept: RADIOLOGY | Facility: HOSPITAL | Age: 42
Discharge: HOME | End: 2022-12-02
Attending: PHYSICIAN ASSISTANT
Payer: COMMERCIAL

## 2022-12-02 ENCOUNTER — PATIENT MESSAGE (OUTPATIENT)
Dept: SPORTS MEDICINE | Facility: CLINIC | Age: 42
End: 2022-12-02
Payer: COMMERCIAL

## 2022-12-02 DIAGNOSIS — S46.212A RUPTURE OF LEFT DISTAL BICEPS TENDON, INITIAL ENCOUNTER: Primary | ICD-10-CM

## 2022-12-02 DIAGNOSIS — S46.212A RUPTURE OF LEFT DISTAL BICEPS TENDON, INITIAL ENCOUNTER: ICD-10-CM

## 2022-12-02 NOTE — ANESTHESIA PAT ROS NOTE
12/02/2022  Felix tSern is a 42 y.o., male.      Pre-op Assessment    I have reviewed the Patient Summary Reports.       I have reviewed the Medications.     Review of Systems  Anesthesia Hx:  No problems with previous Anesthesia   History of prior surgery of interest to airway management or planning:  Previous anesthesia: General 11/1/2022  Repair of Ruptured Distal Left Biceps Tendon with general anesthesia.  Procedure performed at an [facility].      Airway issues documented on chart review include mask, easy, GETA, easy direct laryngoscopy , view on direct laryngoscopy Grade I    Denies Family Hx of Anesthesia complications.    Denies Personal Hx of Anesthesia complications.                    Social:  Non-Smoker, Social Alcohol Use       EENT/Dental:  chronic allergic rhinitis Seasonal Allergies          Cardiovascular:  Cardiovascular Normal Exercise tolerance: good                                           Pulmonary:    Asthma mild    Uses Albuterol Inhaler PRN, about once every few weeks,  Snoring               Renal/:     Vasectomy             Hepatic/GI:     GERD             Musculoskeletal:     Rupture of left distal biceps tendon, Subsequent Encounter,  11/1/22 Repair of Ruptured Left Distal Biceps Tendon,  Hand Surgery            Endocrine:  Denies Diabetes.                Anesthesia Assessment: Preoperative EQUATION      Planned Procedure: Procedure(s) (LRB):  REPAIR,TENDON,DISTAL PROXIMAL (Left)  Requested Anesthesia Type:General  Surgeon: Jossy Samayoa MD  Service: Orthopedics  Known or anticipated Date of Surgery:12/8/2022    Surgeon notes: reviewed      Triage considerations:     The patient has no apparent active cardiac condition (No unstable coronary Syndrome such as severe unstable angina or recent [<1 month] myocardial infarction, decompensated CHF, severe valvular    disease or significant arrhythmia)    Previous anesthesia records:GETA, Easy airway, Easy intubation, and No problems      Anesthesia History 11/1/2022  Repair of Ruptured Left Distal Biceps Tendon:  Placement Date: 11/01/22; Placement Time: 0911 (created via procedure documentation); Method of Intubation: Direct laryngoscopy; Mask Ventilation: Easy; Intubated: Postinduction; Blade: Murry #2; Airway Device Size: 7.5; Placement Verified By: Capnometry; Complicating Factors: None; Intubation Findings: Bilateral breath sounds, Atraumatic/Condition of teeth unchanged; Securment: Lips; Complications: None; Removal Date: 11/01/22;  Removal Time: 1116      Last PCP note: within 3 months , within [facility]   Patient is cleared per PCP for surgery.               Instructions given. (See in Nurse's note)    Ht: 5'6  Wt: 174 lb  BMI: 28.08

## 2022-12-05 ENCOUNTER — HOSPITAL ENCOUNTER (OUTPATIENT)
Dept: RADIOLOGY | Facility: HOSPITAL | Age: 42
Discharge: HOME | End: 2022-12-05
Attending: PHYSICIAN ASSISTANT
Payer: COMMERCIAL

## 2022-12-05 PROCEDURE — 73080 XR ELBOW COMPLETE 3 VIEW LEFT: ICD-10-PCS | Mod: 26,LT,, | Performed by: RADIOLOGY

## 2022-12-05 PROCEDURE — 73080 X-RAY EXAM OF ELBOW: CPT | Mod: TC,LT

## 2022-12-05 PROCEDURE — 73080 X-RAY EXAM OF ELBOW: CPT | Mod: 26,LT,, | Performed by: RADIOLOGY

## 2022-12-07 ENCOUNTER — TELEPHONE (OUTPATIENT)
Dept: ALLERGY | Facility: CLINIC | Age: 42
End: 2022-12-07
Payer: COMMERCIAL

## 2022-12-07 ENCOUNTER — TELEPHONE (OUTPATIENT)
Dept: SPORTS MEDICINE | Facility: CLINIC | Age: 42
End: 2022-12-07
Payer: COMMERCIAL

## 2022-12-07 NOTE — TELEPHONE ENCOUNTER
----- Message from Cony Garcia sent at 12/5/2022 12:26 PM CST -----  Regarding: Appt Conflict  Contact: pt @[phone]  Pt is Requesting a call back Regarding Clarification pt received Test Through Nose 12/5/2022   And is Requesting if appt should still resume for Allergy(12/ 6/2022 apt please Call to discuss further

## 2022-12-08 ENCOUNTER — HOSPITAL ENCOUNTER (OUTPATIENT)
Facility: HOSPITAL | Age: 42
Discharge: HOME | End: 2022-12-08
Attending: ORTHOPAEDIC SURGERY | Admitting: ORTHOPAEDIC SURGERY
Payer: COMMERCIAL

## 2022-12-08 ENCOUNTER — ANESTHESIA (OUTPATIENT)
Dept: SURGERY | Facility: HOSPITAL | Age: 42
End: 2022-12-08
Payer: COMMERCIAL

## 2022-12-08 VITALS
BODY MASS INDEX: 27.32 KG/M2 | WEIGHT: 170 LBS | TEMPERATURE: 98 F | SYSTOLIC BLOOD PRESSURE: 152 MMHG | RESPIRATION RATE: 17 BRPM | OXYGEN SATURATION: 100 % | DIASTOLIC BLOOD PRESSURE: 95 MMHG | HEART RATE: 115 BPM | HEIGHT: 66 IN

## 2022-12-08 DIAGNOSIS — S46.212A BICEPS RUPTURE, DISTAL, LEFT, INITIAL ENCOUNTER: ICD-10-CM

## 2022-12-08 DIAGNOSIS — S46.212A RUPTURE OF LEFT DISTAL BICEPS TENDON, INITIAL ENCOUNTER: ICD-10-CM

## 2022-12-08 PROCEDURE — 71000015 HC POSTOP RECOV 1ST HR: Performed by: ORTHOPAEDIC SURGERY

## 2022-12-08 PROCEDURE — C1889 IMPLANT/INSERT DEVICE, NOC: HCPCS | Performed by: ORTHOPAEDIC SURGERY

## 2022-12-08 PROCEDURE — D9220A PRA ANESTHESIA: ICD-10-PCS | Mod: ANES,,, | Performed by: ANESTHESIOLOGY

## 2022-12-08 PROCEDURE — 27201423 OPTIME MED/SURG SUP & DEVICES STERILE SUPPLY: Performed by: ORTHOPAEDIC SURGERY

## 2022-12-08 PROCEDURE — D9220A PRA ANESTHESIA: ICD-10-PCS | Mod: CRNA,,, | Performed by: NURSE ANESTHETIST, CERTIFIED REGISTERED

## 2022-12-08 PROCEDURE — 24342 REPAIR OF RUPTURED TENDON: CPT | Mod: 58,22,LT, | Performed by: ORTHOPAEDIC SURGERY

## 2022-12-08 PROCEDURE — 24342 REPAIR OF RUPTURED TENDON: CPT | Mod: 58,AS,22,LT | Performed by: PHYSICIAN ASSISTANT

## 2022-12-08 PROCEDURE — 25000003 PHARM REV CODE 250: Performed by: ORTHOPAEDIC SURGERY

## 2022-12-08 PROCEDURE — 25000003 PHARM REV CODE 250: Performed by: PHYSICIAN ASSISTANT

## 2022-12-08 PROCEDURE — 63600175 PHARM REV CODE 636 W HCPCS: Performed by: ORTHOPAEDIC SURGERY

## 2022-12-08 PROCEDURE — 24342 PR REINSERT BI/TRICEPS TENDON,DISTAL: ICD-10-PCS | Mod: 58,22,LT, | Performed by: ORTHOPAEDIC SURGERY

## 2022-12-08 PROCEDURE — 36000708 HC OR TIME LEV III 1ST 15 MIN: Performed by: ORTHOPAEDIC SURGERY

## 2022-12-08 PROCEDURE — 25000003 PHARM REV CODE 250: Performed by: NURSE ANESTHETIST, CERTIFIED REGISTERED

## 2022-12-08 PROCEDURE — 99900035 HC TECH TIME PER 15 MIN (STAT)

## 2022-12-08 PROCEDURE — 24342 PR REINSERT BI/TRICEPS TENDON,DISTAL: ICD-10-PCS | Mod: 58,AS,22,LT | Performed by: PHYSICIAN ASSISTANT

## 2022-12-08 PROCEDURE — 63600175 PHARM REV CODE 636 W HCPCS: Performed by: NURSE ANESTHETIST, CERTIFIED REGISTERED

## 2022-12-08 PROCEDURE — C1713 ANCHOR/SCREW BN/BN,TIS/BN: HCPCS | Performed by: ORTHOPAEDIC SURGERY

## 2022-12-08 PROCEDURE — 71000033 HC RECOVERY, INTIAL HOUR: Performed by: ORTHOPAEDIC SURGERY

## 2022-12-08 PROCEDURE — D9220A PRA ANESTHESIA: Mod: CRNA,,, | Performed by: NURSE ANESTHETIST, CERTIFIED REGISTERED

## 2022-12-08 PROCEDURE — D9220A PRA ANESTHESIA: Mod: ANES,,, | Performed by: ANESTHESIOLOGY

## 2022-12-08 PROCEDURE — 37000008 HC ANESTHESIA 1ST 15 MINUTES: Performed by: ORTHOPAEDIC SURGERY

## 2022-12-08 PROCEDURE — 36000709 HC OR TIME LEV III EA ADD 15 MIN: Performed by: ORTHOPAEDIC SURGERY

## 2022-12-08 PROCEDURE — 37000009 HC ANESTHESIA EA ADD 15 MINS: Performed by: ORTHOPAEDIC SURGERY

## 2022-12-08 PROCEDURE — 94761 N-INVAS EAR/PLS OXIMETRY MLT: CPT

## 2022-12-08 DEVICE — SYS IMPL DEL DISTAL BICEPS BC: Type: IMPLANTABLE DEVICE | Site: ARM | Status: FUNCTIONAL

## 2022-12-08 RX ORDER — TRAMADOL HYDROCHLORIDE 50 MG/1
100 TABLET ORAL EVERY 6 HOURS PRN
Status: DISCONTINUED | OUTPATIENT
Start: 2022-12-08 | End: 2022-12-08 | Stop reason: HOSPADM

## 2022-12-08 RX ORDER — OXYCODONE HYDROCHLORIDE 5 MG/1
10 TABLET ORAL EVERY 4 HOURS PRN
Status: DISCONTINUED | OUTPATIENT
Start: 2022-12-08 | End: 2022-12-08 | Stop reason: HOSPADM

## 2022-12-08 RX ORDER — ONDANSETRON 2 MG/ML
4 INJECTION INTRAMUSCULAR; INTRAVENOUS DAILY PRN
Status: DISCONTINUED | OUTPATIENT
Start: 2022-12-08 | End: 2022-12-08 | Stop reason: HOSPADM

## 2022-12-08 RX ORDER — ONDANSETRON 2 MG/ML
INJECTION INTRAMUSCULAR; INTRAVENOUS
Status: DISCONTINUED | OUTPATIENT
Start: 2022-12-08 | End: 2022-12-08

## 2022-12-08 RX ORDER — FENTANYL CITRATE 50 UG/ML
INJECTION, SOLUTION INTRAMUSCULAR; INTRAVENOUS
Status: DISCONTINUED | OUTPATIENT
Start: 2022-12-08 | End: 2022-12-08

## 2022-12-08 RX ORDER — IBUPROFEN 600 MG/1
600 TABLET ORAL 3 TIMES DAILY
COMMUNITY
End: 2023-03-01 | Stop reason: ALTCHOICE

## 2022-12-08 RX ORDER — VASOPRESSIN 20 [USP'U]/ML
INJECTION, SOLUTION INTRAMUSCULAR; SUBCUTANEOUS
Status: DISCONTINUED | OUTPATIENT
Start: 2022-12-08 | End: 2022-12-08

## 2022-12-08 RX ORDER — METHOCARBAMOL 500 MG/1
1000 TABLET, FILM COATED ORAL ONCE
Status: DISCONTINUED | OUTPATIENT
Start: 2022-12-08 | End: 2022-12-08 | Stop reason: HOSPADM

## 2022-12-08 RX ORDER — ROCURONIUM BROMIDE 10 MG/ML
INJECTION, SOLUTION INTRAVENOUS
Status: DISCONTINUED | OUTPATIENT
Start: 2022-12-08 | End: 2022-12-08

## 2022-12-08 RX ORDER — DEXAMETHASONE SODIUM PHOSPHATE 4 MG/ML
INJECTION, SOLUTION INTRA-ARTICULAR; INTRALESIONAL; INTRAMUSCULAR; INTRAVENOUS; SOFT TISSUE
Status: DISCONTINUED | OUTPATIENT
Start: 2022-12-08 | End: 2022-12-08

## 2022-12-08 RX ORDER — FAMOTIDINE 10 MG/ML
INJECTION INTRAVENOUS
Status: DISCONTINUED | OUTPATIENT
Start: 2022-12-08 | End: 2022-12-08

## 2022-12-08 RX ORDER — NEOSTIGMINE METHYLSULFATE 0.5 MG/ML
INJECTION, SOLUTION INTRAVENOUS
Status: DISCONTINUED | OUTPATIENT
Start: 2022-12-08 | End: 2022-12-08

## 2022-12-08 RX ORDER — PHENYLEPHRINE HYDROCHLORIDE 10 MG/ML
INJECTION INTRAVENOUS
Status: DISCONTINUED | OUTPATIENT
Start: 2022-12-08 | End: 2022-12-08

## 2022-12-08 RX ORDER — CLINDAMYCIN PHOSPHATE 900 MG/50ML
900 INJECTION, SOLUTION INTRAVENOUS
Status: COMPLETED | OUTPATIENT
Start: 2022-12-08 | End: 2022-12-08

## 2022-12-08 RX ORDER — ONDANSETRON 2 MG/ML
4 INJECTION INTRAMUSCULAR; INTRAVENOUS EVERY 12 HOURS PRN
Status: DISCONTINUED | OUTPATIENT
Start: 2022-12-08 | End: 2022-12-08 | Stop reason: HOSPADM

## 2022-12-08 RX ORDER — SODIUM CHLORIDE 0.9 % (FLUSH) 0.9 %
10 SYRINGE (ML) INJECTION
Status: DISCONTINUED | OUTPATIENT
Start: 2022-12-08 | End: 2022-12-08 | Stop reason: HOSPADM

## 2022-12-08 RX ORDER — HALOPERIDOL 5 MG/ML
0.5 INJECTION INTRAMUSCULAR EVERY 10 MIN PRN
Status: DISCONTINUED | OUTPATIENT
Start: 2022-12-08 | End: 2022-12-08 | Stop reason: HOSPADM

## 2022-12-08 RX ORDER — KETAMINE HCL IN 0.9 % NACL 50 MG/5 ML
SYRINGE (ML) INTRAVENOUS
Status: DISCONTINUED | OUTPATIENT
Start: 2022-12-08 | End: 2022-12-08

## 2022-12-08 RX ORDER — PROPOFOL 10 MG/ML
VIAL (ML) INTRAVENOUS
Status: DISCONTINUED | OUTPATIENT
Start: 2022-12-08 | End: 2022-12-08

## 2022-12-08 RX ORDER — PROMETHAZINE HYDROCHLORIDE 25 MG/1
25 TABLET ORAL EVERY 6 HOURS PRN
Status: DISCONTINUED | OUTPATIENT
Start: 2022-12-08 | End: 2022-12-08 | Stop reason: HOSPADM

## 2022-12-08 RX ORDER — PREGABALIN 75 MG/1
75 CAPSULE ORAL
Status: COMPLETED | OUTPATIENT
Start: 2022-12-08 | End: 2022-12-08

## 2022-12-08 RX ORDER — FENTANYL CITRATE 50 UG/ML
25 INJECTION, SOLUTION INTRAMUSCULAR; INTRAVENOUS EVERY 5 MIN PRN
Status: DISCONTINUED | OUTPATIENT
Start: 2022-12-08 | End: 2022-12-08 | Stop reason: HOSPADM

## 2022-12-08 RX ORDER — KETOROLAC TROMETHAMINE 30 MG/ML
INJECTION, SOLUTION INTRAMUSCULAR; INTRAVENOUS
Status: DISCONTINUED | OUTPATIENT
Start: 2022-12-08 | End: 2022-12-08 | Stop reason: HOSPADM

## 2022-12-08 RX ORDER — LIDOCAINE HYDROCHLORIDE 10 MG/ML
INJECTION, SOLUTION INTRAVENOUS
Status: DISCONTINUED | OUTPATIENT
Start: 2022-12-08 | End: 2022-12-08

## 2022-12-08 RX ORDER — MIDAZOLAM HYDROCHLORIDE 1 MG/ML
INJECTION INTRAMUSCULAR; INTRAVENOUS
Status: DISCONTINUED | OUTPATIENT
Start: 2022-12-08 | End: 2022-12-08

## 2022-12-08 RX ORDER — MORPHINE SULFATE 2 MG/ML
2 INJECTION, SOLUTION INTRAMUSCULAR; INTRAVENOUS EVERY 10 MIN PRN
Status: DISCONTINUED | OUTPATIENT
Start: 2022-12-08 | End: 2022-12-08 | Stop reason: HOSPADM

## 2022-12-08 RX ORDER — ROPIVACAINE HYDROCHLORIDE 5 MG/ML
INJECTION, SOLUTION EPIDURAL; INFILTRATION; PERINEURAL
Status: DISCONTINUED | OUTPATIENT
Start: 2022-12-08 | End: 2022-12-08 | Stop reason: HOSPADM

## 2022-12-08 RX ORDER — OXYCODONE HCL 10 MG/1
10 TABLET, FILM COATED, EXTENDED RELEASE ORAL
Status: COMPLETED | OUTPATIENT
Start: 2022-12-08 | End: 2022-12-08

## 2022-12-08 RX ORDER — PREGABALIN 75 MG/1
75 CAPSULE ORAL ONCE
Status: DISCONTINUED | OUTPATIENT
Start: 2022-12-08 | End: 2022-12-08 | Stop reason: HOSPADM

## 2022-12-08 RX ORDER — OXYCODONE HYDROCHLORIDE 5 MG/1
5 TABLET ORAL
Status: DISCONTINUED | OUTPATIENT
Start: 2022-12-08 | End: 2022-12-08 | Stop reason: HOSPADM

## 2022-12-08 RX ORDER — SODIUM CHLORIDE 9 MG/ML
INJECTION, SOLUTION INTRAVENOUS CONTINUOUS
Status: DISCONTINUED | OUTPATIENT
Start: 2022-12-08 | End: 2022-12-08 | Stop reason: HOSPADM

## 2022-12-08 RX ORDER — KETAMINE HYDROCHLORIDE 100 MG/ML
INJECTION, SOLUTION INTRAMUSCULAR; INTRAVENOUS
Status: DISCONTINUED | OUTPATIENT
Start: 2022-12-08 | End: 2022-12-08 | Stop reason: HOSPADM

## 2022-12-08 RX ADMIN — DEXAMETHASONE SODIUM PHOSPHATE 8 MG: 4 INJECTION, SOLUTION INTRAMUSCULAR; INTRAVENOUS at 10:12

## 2022-12-08 RX ADMIN — SODIUM CHLORIDE: 0.9 INJECTION, SOLUTION INTRAVENOUS at 08:12

## 2022-12-08 RX ADMIN — Medication 20 MG: at 09:12

## 2022-12-08 RX ADMIN — PHENYLEPHRINE HYDROCHLORIDE 200 MCG: 10 INJECTION INTRAVENOUS at 10:12

## 2022-12-08 RX ADMIN — LIDOCAINE HYDROCHLORIDE 100 MG: 10 INJECTION, SOLUTION INTRAVENOUS at 09:12

## 2022-12-08 RX ADMIN — ONDANSETRON 4 MG: 2 INJECTION INTRAMUSCULAR; INTRAVENOUS at 11:12

## 2022-12-08 RX ADMIN — VASOPRESSIN 2 UNITS: 20 INJECTION INTRAVENOUS at 11:12

## 2022-12-08 RX ADMIN — PHENYLEPHRINE HYDROCHLORIDE 100 MCG: 10 INJECTION INTRAVENOUS at 10:12

## 2022-12-08 RX ADMIN — NEOSTIGMINE METHYLSULFATE 3 MG: 0.5 INJECTION, SOLUTION INTRAVENOUS at 12:12

## 2022-12-08 RX ADMIN — FAMOTIDINE 20 MG: 10 INJECTION, SOLUTION INTRAVENOUS at 10:12

## 2022-12-08 RX ADMIN — PHENYLEPHRINE HYDROCHLORIDE 200 MCG: 10 INJECTION INTRAVENOUS at 11:12

## 2022-12-08 RX ADMIN — GLYCOPYRROLATE 0.4 MG: 0.2 INJECTION, SOLUTION INTRAMUSCULAR; INTRAVENOUS at 12:12

## 2022-12-08 RX ADMIN — SODIUM CHLORIDE: 9 INJECTION, SOLUTION INTRAVENOUS at 08:12

## 2022-12-08 RX ADMIN — PREGABALIN 75 MG: 75 CAPSULE ORAL at 08:12

## 2022-12-08 RX ADMIN — MIDAZOLAM HYDROCHLORIDE 2 MG: 1 INJECTION, SOLUTION INTRAMUSCULAR; INTRAVENOUS at 09:12

## 2022-12-08 RX ADMIN — PROPOFOL 200 MG: 10 INJECTION, EMULSION INTRAVENOUS at 09:12

## 2022-12-08 RX ADMIN — PHENYLEPHRINE HYDROCHLORIDE 0.25 MCG/KG/MIN: 10 INJECTION INTRAVENOUS at 11:12

## 2022-12-08 RX ADMIN — FENTANYL CITRATE 100 MCG: 50 INJECTION, SOLUTION INTRAMUSCULAR; INTRAVENOUS at 09:12

## 2022-12-08 RX ADMIN — OXYCODONE HYDROCHLORIDE 10 MG: 10 TABLET, FILM COATED, EXTENDED RELEASE ORAL at 08:12

## 2022-12-08 RX ADMIN — SODIUM CHLORIDE, SODIUM GLUCONATE, SODIUM ACETATE, POTASSIUM CHLORIDE, MAGNESIUM CHLORIDE, SODIUM PHOSPHATE, DIBASIC, AND POTASSIUM PHOSPHATE: .53; .5; .37; .037; .03; .012; .00082 INJECTION, SOLUTION INTRAVENOUS at 10:12

## 2022-12-08 RX ADMIN — CLINDAMYCIN IN 5 PERCENT DEXTROSE 900 MG: 18 INJECTION, SOLUTION INTRAVENOUS at 10:12

## 2022-12-08 RX ADMIN — VASOPRESSIN 4 UNITS: 20 INJECTION INTRAVENOUS at 11:12

## 2022-12-08 RX ADMIN — ROCURONIUM BROMIDE 40 MG: 10 INJECTION INTRAVENOUS at 09:12

## 2022-12-08 NOTE — ANESTHESIA PREPROCEDURE EVALUATION
12/08/2022  Felix Stern is a 42 y.o., male.    Procedure Summary    Case: [id number] Date/Time: 12/08/22 1100   Procedure: REPAIR,TENDON,DISTAL PROXIMAL (Left: Arm) - Distal bicep repair   Anesthesia type: General   Diagnosis: Rupture of left distal biceps tendon [S46.212A]         Patient Active Problem List   Diagnosis    Gastroesophageal reflux disease without esophagitis    Left shoulder pain    Biceps muscle tear, left, subsequent encounter    Chronic seasonal allergic rhinitis due to pollen    Mild intermittent asthma    Rupture of distal biceps tendon, left, initial encounter    Decreased range of motion of left elbow    Weakness of left upper extremity    Pain aggravated by lifting       Review of patient's allergies indicates:  No Known Allergies    Current Facility-Administered Medications on File Prior to Visit   Medication Dose Route Frequency Provider Last Rate Last Admin    0.9%  NaCl infusion   Intravenous Continuous Marcin iVera III, PA-C 100 mL/hr at 12/08/22 0855 New Bag at 12/08/22 0855    clindamycin in D5W 900 mg/50 mL IVPB 900 mg  900 mg Intravenous On Call Procedure Marcin Viera III, PA-C         Current Outpatient Medications on File Prior to Visit   Medication Sig Dispense Refill    acyclovir (ZOVIRAX) 400 MG tablet       amoxicillin (AMOXIL) 250 MG capsule Take 250 mg by mouth every 8 (eight) hours.      aspirin (ECOTRIN) 81 MG EC tablet Take 1 tablet (81 mg total) by mouth once daily. For 4 weeks starting the day after surgery. 28 tablet 0    citalopram (CELEXA) 20 MG tablet Take 20 mg by mouth once daily.      diazePAM (VALIUM) 5 MG tablet Take 1 tablet (5 mg total) by mouth once. Take all 2-3 pills at once 30-45 minutes prior to procedure. for 1 dose (Patient not taking: Reported on 10/28/2022) 3 tablet 0    esomeprazole magnesium (NEXIUM) 10 mg  suspension Take 10 mg by mouth before breakfast.      famotidine (PEPCID) 10 MG tablet Take 10 mg by mouth 2 (two) times daily.      ibuprofen (ADVIL,MOTRIN) 600 MG tablet Take 600 mg by mouth 3 (three) times daily.      indomethacin (INDOCIN) 25 MG capsule Take 3 capsules (75 mg total) by mouth once daily. Take with food. Take on post-op days 1,2,3,&4. 12 capsule 0    meloxicam (MOBIC) 15 MG tablet TAKE 1 TABLET BY MOUTH DAILY 30 tablet 1    methocarbamol (ROBAXIN) 750 MG Tab TAKE 1 TABLET BY MOUTH NIGHTLY AS NEEDED FOR SPASMS 40 tablet 0    multivitamin (THERAGRAN) per tablet Take 1 tablet by mouth once daily.      naproxen (NAPROSYN) 500 MG tablet Take 1 tablet (500 mg total) by mouth every 12 (twelve) hours. Take with food. Starting on post-op day 5 for 21 days 42 tablet 0    oxyCODONE-acetaminophen (PERCOCET)  mg per tablet Take 1 tablet by mouth every 4-6 hours as needed for pain. Take stool softener with this medication. 21 tablet 0    pantoprazole (PROTONIX) 40 MG tablet Take 40 mg by mouth once daily.      promethazine (PHENERGAN) 25 MG tablet Take 1 tablet (25 mg total) by mouth every 6 (six) hours as needed for Nausea. 6 tablet 0    ranitidine (ZANTAC) 15 mg/mL syrup Take by mouth every 12 (twelve) hours.      traMADoL (ULTRAM) 50 mg tablet Take 1-2 tablets ( mg total) by mouth every 6 (six) hours as needed for Pain. 21 tablet 0    VENTOLIN HFA 90 mcg/actuation inhaler          Past Surgical History:   Procedure Laterality Date    HAND SURGERY      VASECTOMY           CBC:  No results found for: WBC, RBC, HGB, HCT, PLT, MCV, MCH, MCHC    CMP:   No results found for: NA, K, CL, CO2, BUN, CREATININE, GLU, MG, PHOS, CALCIUM, ALBUMIN, PROT, ALKPHOS, ALT, AST, BILITOT    INR:  No results found for: PT, INR, PROTIME, APTT      Diagnostic Studies:      EKG:   No results found for this or any previous visit.     2D Echo:  No results found for this or any previous visit.    Stress Test:    No results found for this or any previous visit.      Pre-op Vitals   BP Pulse Resp Temp SpO2   -- -- -- -- --      Height Weight BMI (Calculated)     -- -- --         Pre-op Vitals   BP Pulse Resp Temp SpO2   -- -- -- -- --      Height Weight BMI (Calculated)     -- -- --            Pre-op Assessment    I have reviewed the Patient Summary Reports.     I have reviewed the Nursing Notes. I have reviewed the NPO Status.      Review of Systems  Anesthesia Hx:  No problems with previous Anesthesia  Denies Family Hx of Anesthesia complications.   Denies Personal Hx of Anesthesia complications.   Social:  Non-Smoker, Social Alcohol Use    Cardiovascular:   Exercise tolerance: good Hypertension Not on meds for HTN but monitoring now   Pulmonary:   Asthma    Hepatic/GI:   GERD, well controlled        Physical Exam  General: Well nourished    Airway:  Mallampati: II   Mouth Opening: Normal  TM Distance: Normal  Neck ROM: Normal ROM    Dental:  Intact    Chest/Lungs:  Clear to auscultation, Normal Respiratory Rate    Heart:  Rate: Normal  Rhythm: Regular Rhythm        Anesthesia Plan  Type of Anesthesia, risks & benefits discussed:    Anesthesia Type: Gen ETT  Intra-op Monitoring Plan: Standard ASA Monitors  Post Op Pain Control Plan: multimodal analgesia and IV/PO Opioids PRN  Induction:  IV  Informed Consent: Informed consent signed with the Patient and all parties understand the risks and agree with anesthesia plan.  All questions answered.   ASA Score: 2    Ready For Surgery From Anesthesia Perspective.     .

## 2022-12-08 NOTE — OPERATIVE NOTE ADDENDUM
Certification of Assistant at Surgery       Surgery Date: 12/8/2022     Participating Surgeons:  Surgeon(s) and Role:     * Jossy Samayoa MD - Primary    MICHAELA Viera PA-C - 1st Assistant     Procedures:  Procedure(s) (LRB):  REPAIR,TENDON,DISTAL PROXIMAL (Left)    Assistant Surgeon's Certification of Necessity:  I understand that section 1842 (b) (6) (d) of the Social Security Act generally prohibits Medicare Part B reasonable charge payment for the services of assistants at surgery in teaching hospitals when qualified residents are available to furnish such services. I certify that the services for which payment is claimed were medically necessary, and that no qualified resident was available to perform the services. I further understand that these services are subject to post-payment review by the Medicare carrier.         Marcin Viera PA-C    12/08/2022  2:32 PM

## 2022-12-08 NOTE — OP NOTE
DATE OF PROCEDURE: 12/08/2022     SURGEON: Jossy Samayoa M.D.      ASSISTANT: MICHAELA Viera PA-C  The use of an assistant was medically necessary for positioning, skin retraction, and assistance with this procedure. The procedure could not be performed without the use of an assistant.   There was no qualified resident/fellow available for assistance with this procedure.     PREOPERATIVE DIAGNOSIS:   Left distal biceps rupture, chronic, retracted.   Left elbow adhesions  Let elbow retained hardware     POSTOPERATIVE DIAGNOSIS:   Left distal biceps rupture, chronic, retracted.   Left elbow adhesions  Let elbow retained hardware     PROCEDURE PERFORMED:    1. Open revision repair of Left distal biceps tendon (CPT 19965). (complex, -22 modifier)  2. Open Lysis of adhesions to left elbow  3. Removal of hardware left elbow     ANESTHESIA: General      ESTIMATED BLOOD LOSS: 5cc.      COMPLICATIONS: None.      COMPLEX PROCEDURE:  There was an altered surgical field to the biceps tendon. There was abnormal anatomy. There was major scarring to the area which needed to be dissected with a tendon that was shortened and retracted significantly. Repair was complex in nature due to the prior surgery and revision nature of this case, and altered anatomy of this case.  There was an altered surgical field with abnormal anatomy. Complexity of the service was much greater than the normative procedure. There was increased time, intensity and technical difficulty of the procedure, severity of the patient's condition and mental effort required.  This was a highly complicated repair and tear pattern that required advanced surgical skill in order to safely and technically perform it.  Nevertheless the repair achieved was excellent.     BRIEF INDICATION OF MEDICAL NECESSITY: The patient is a 42 y.o. year-old male  who was seen in the office with a history with a history and physical examination findings consistent with the above. Trauma s/p  prior distal biceps recon surgery. Nonoperative versus operative options were discussed. The risks and benefits were discussed with the patient. The patient acknowledged understanding and wished to proceed with operative intervention. Informed consent was obtained prior to the procedure. Details of the surgical procedure were explained, including incisions and probable rehabilitation course. The patient understands the likely length of convalescence after surgery; and we have explained the risks, benefits, and alternatives of surgery. Reasonable expectations and potential complications were discussed and acknowledged, including but not limited to infection, bleeding, blood clots, (DVT and/or PE), nerve injury, retear, instability, hardware complications, fracture, continued pain and stiffness, cardiopulmonary compromise and anesthetic complications. Also, risk of heterotopic ossification and neuropraxia were also explained. . It was also explained that there was a chance of failure which may require further management and procedure(s). The patient agreed and understood and wished to proceed.      PROCEDURE IN DETAIL: The correct operative site was marked and consents were verified, and patient was taken to the Operating Room and placed supine on the operating table. General endotracheal anesthesia was then applied via the Anesthesia Team. All pressure points were well padded. A tourniquet was placed high on the operative arm. The operative upper extremity was prepped and draped in the usual sterile fashion.      Using Esmarch for exsanguination, the tourniquet was insufflated to 250 mmHg. Appropriate landmarks were noticed on the skin. A small Z transverse incision using prior incision, extended. This was done in supination. The skin of subcutaneous tissue was dissected. The lacertus fibrosus was identified. Skin and subcutaneous tissues were dissected sharply. During dissection, the lateral antebrachial cutaneous  nerve was identified and protected throughout the case.     Elbow  lysis of adhesions, equivalent to the open procedure: an extensive lysis of adhesions needed to be performed with lysis of adhesions in the anterior elbow fossa where there was extensive scarring from the prior surgery and trauma  nature of the cuff deficiency. After the lysis of adhesions, the mobility was restored to the shoulder.    After dissection, biceps tendon stump was identified.  Tourniquet was deflated to allow for max tendon excursion. The normal pathway for the biceps was then manually traced back down to the biceps tuberosity on the radius. The remaining stump on the biceps tendon was dissected and cleared of all fibrotic or fatty tissue. The tendon was brought up to its normal length with an Allis clamp, then loop stitched the distal end of the biceps tendon using fiber loop suture.     The tract of the biceps tendon was then followed down to the level of the radial tuberosity. Pronating and supinating of the forearm was used to localize the radial tuberosity. The curved Annika clamps were then placed down through the antecubital fossa through the tracts of the biceps tendon along the radius with the arm in pronation. The curve was around radius through the supinator and extensor mass. It was palpated posteriorly.      COMPLEX PROCEDURE:  There was an altered surgical field to the biceps tendon. There was abnormal anatomy. There was major scarring to the area which needed to be dissected with a tendon that was shortened and retracted significantly. Repair was complex in nature due to the prior surgery and revision nature of this case, and altered anatomy of this case.  There was an altered surgical field with abnormal anatomy. Complexity of the service was much greater than the normative procedure. There was increased time, intensity and technical difficulty of the procedure, severity of the patient's condition and mental effort  required.  This was a highly complicated repair and tear pattern that required advanced surgical skill in order to safely and technically perform it.  Nevertheless the repair achieved was excellent.     A 8-cm incision was then made posteriorly along the length of the Annika clamp. The fascia was then incised in line with the incision. No Marisol retractor was placed radially in order to protect the PIN.  PIN was identified and carefully protected this throughout the entire case while dividing muscle fibers down to the level of the radius.  Care was taken to not excessively retract near the PIN throughout the case. The radial tuberosity was then cleared while the arm was in full pronation and flexion. Graft was sized to a 7 mm. Prior tunnel was utilized and debrided. Prior button and suture was removed.      This was done in pronation. Copious and gentle irrigation was performed twice.  Anatomical repair of the distal biceps tendon was commenced using tension-slide technique. The biceps button was loaded, sutures and biceps button and attachment were intact. Button was placed through the hole through the floor of cortex, and flipped appropriately. The button was then visually inspected and palpated and indeed had flipped. No soft tissue was interposing. Tendon was then tensioned down into the socket tunnel as above. One of the sutures was then used to place a knot through the middle of the tendon and it was tied.  Irrigation was performed again.       Fixation was solid. Upon extending the elbow, the biceps became more tensioned at approximately 60 degrees. The patient had full pronation and supination. Both wounds were then thoroughly irrigated. Tourniquet was released and hemostasis was achieved using electrocautery. The lacertus fibrosus was allowed to lie on top of the biceps tendon. The fascia layer was closed. Subcutaneous tissue was then closed using 3-0 Vicryl interrupted suture. Monocryl 4-0 was then used  subcuticular. The Steri-Strips were placed along with Xeroform, 4 x 4's, and cast padding and the posterior split at 90 degrees. TENS unit pads were placed which were medically necessary for pain relief.       The patient was then awoken from general endotracheal anesthesia and transported to the Recovery Room in good stable condition, with compartments soft and capillary refill less than a second in all digits.      POSTOPERATIVE CARE: Will keep patient in splint for approximately 14 days. Then brace locked at 90 x 14 days.  At that time, will take out and begin dynamic splinting.      Possible staged need for allograft recon discussed due to the shortened tendon with scar.     Keep at  90° x 4 weeks, then increase range of motion (extension) by 10° per week with only forearm rotation allowed with fully elbow flexed X4 weeks.

## 2022-12-08 NOTE — TRANSFER OF CARE
"Anesthesia Transfer of Care Note    Patient: Felix Stern    Procedure(s) Performed: Procedure(s) (LRB):  REPAIR,TENDON,DISTAL PROXIMAL (Left)    Patient location: PACU    Anesthesia Type: general    Transport from OR: Transported from OR on 6-10 L/min O2 by face mask with adequate spontaneous ventilation    Post pain: adequate analgesia    Post assessment: no apparent anesthetic complications    Post vital signs: stable    Level of consciousness: awake    Nausea/Vomiting: no nausea/vomiting    Complications: none    Transfer of care protocol was followed      Last vitals:   Visit Vitals  BP (!) 138/91   Pulse 90   Temp 36.7 °C (98 °F) (Oral)   Resp 16   Ht 5' 6" (1.676 m)   Wt 77.1 kg (170 lb)   SpO2 96%   BMI 27.44 kg/m²     "

## 2022-12-08 NOTE — PLAN OF CARE
Pre op complete. Patient resting comfortably. Call light in reach. No family in facility, belongings in locker. Waiting on surgical consent & anesthesia consent.

## 2022-12-08 NOTE — ANESTHESIA PROCEDURE NOTES
Intubation    Date/Time: 12/8/2022 10:02 AM  Performed by: Abigail Valle CRNA  Authorized by: Divina Ferguson MD     Intubation:     Induction:  Intravenous    Intubated:  Postinduction    Mask Ventilation:  Easy mask    Attempts:  1    Attempted By:  CRNA    Method of Intubation:  Direct    Blade:  Murry 2    Laryngeal View Grade: Grade I - full view of cords      Difficult Airway Encountered?: No      Complications:  None    Airway Device:  Oral endotracheal tube    Airway Device Size:  7.5    Style/Cuff Inflation:  Cuffed    Inflation Amount (mL):  6    Tube secured:  22    Secured at:  The lips    Placement Verified By:  Capnometry    Complicating Factors:  None    Findings Post-Intubation:  BS equal bilateral and atraumatic/condition of teeth unchanged

## 2022-12-08 NOTE — DISCHARGE SUMMARY
Loop - Surgery ([facility])  Brief Operative Note    Surgery Date: 12/8/2022     Surgeon(s) and Role:     * Jossy Samayoa MD - Primary    Assisting Surgeon: None    MICHAELA Viera PA-C - 1st Assistant     Pre-op Diagnosis:  Rupture of left distal biceps tendon [S46.212A]    Post-op Diagnosis:  Post-Op Diagnosis Codes:     * Rupture of left distal biceps tendon [S46.212A]    Procedure(s) (LRB):  REPAIR,TENDON,DISTAL PROXIMAL (Left)    Anesthesia: General    Operative Findings: left open distal biceps repair (revision)    Estimated Blood Loss: minimal        Specimens:   Specimen (24h ago, onward)      None              Discharge Note    OUTCOME: Patient tolerated treatment/procedure well without complication and is now ready for discharge.    DISPOSITION: Home or Self Care    FINAL DIAGNOSIS: Left distal biceps rupture    FOLLOWUP: In clinic    DISCHARGE INSTRUCTIONS:    Discharge Procedure Orders   Diet general     Call MD for:  temperature >100.4     Call MD for:  persistent nausea and vomiting     Call MD for:  severe uncontrolled pain     Call MD for:  difficulty breathing, headache or visual disturbances     Call MD for:  redness, tenderness, or signs of infection (pain, swelling, redness, odor or green/yellow discharge around incision site)     Call MD for:  hives     Call MD for:  persistent dizziness or light-headedness     Keep surgical extremity elevated     No driving, operating heavy equipment or signing legal documents while taking pain medication     Leave dressing on - Keep it clean, dry, and intact until clinic visit

## 2022-12-09 NOTE — ANESTHESIA POSTPROCEDURE EVALUATION
Anesthesia Post Evaluation    Patient: Felix Stern    Procedure(s) Performed: Procedure(s) (LRB):  REPAIR,TENDON,DISTAL PROXIMAL (Left)    Final Anesthesia Type: general      Patient location during evaluation: PACU  Patient participation: Yes- Able to Participate  Level of consciousness: awake and alert  Post-procedure vital signs: reviewed and stable  Pain management: adequate  Airway patency: patent    PONV status at discharge: No PONV  Anesthetic complications: no      Cardiovascular status: blood pressure returned to baseline  Respiratory status: unassisted  Hydration status: euvolemic  Follow-up not needed.          Vitals Value Taken Time   /95 12/08/22 1415   Temp 36.9 °C (98.4 °F) 12/08/22 1415   Pulse 111 12/08/22 1416   Resp 17 12/08/22 1415   SpO2 94 % 12/08/22 1416   Vitals shown include unvalidated device data.      Event Time   Out of Recovery 13:30:00         Pain/Romie Score: Pain Rating Prior to Med Admin: 1 (12/8/2022  8:54 AM)  Romie Score: 7 (12/8/2022  1:15 PM)

## 2022-12-19 ENCOUNTER — OFFICE VISIT (OUTPATIENT)
Dept: SPORTS MEDICINE | Facility: CLINIC | Age: 42
End: 2022-12-19
Payer: COMMERCIAL

## 2022-12-19 ENCOUNTER — HOSPITAL ENCOUNTER (OUTPATIENT)
Dept: RADIOLOGY | Facility: HOSPITAL | Age: 42
Discharge: HOME | End: 2022-12-19
Attending: PHYSICIAN ASSISTANT
Payer: COMMERCIAL

## 2022-12-19 VITALS — BODY MASS INDEX: 28.17 KG/M2 | WEIGHT: 175.31 LBS | HEIGHT: 66 IN

## 2022-12-19 DIAGNOSIS — Z09 S/P ORTHOPEDIC SURGERY, FOLLOW-UP EXAM: Primary | ICD-10-CM

## 2022-12-19 DIAGNOSIS — S46.212A RUPTURE OF LEFT DISTAL BICEPS TENDON, INITIAL ENCOUNTER: ICD-10-CM

## 2022-12-19 DIAGNOSIS — G89.18 POST-OPERATIVE PAIN: ICD-10-CM

## 2022-12-19 DIAGNOSIS — M25.522 LEFT ELBOW PAIN: ICD-10-CM

## 2022-12-19 PROCEDURE — 1160F PR REVIEW ALL MEDS BY PRESCRIBER/CLIN PHARMACIST DOCUMENTED: ICD-10-PCS | Mod: CPTII,S$GLB,, | Performed by: PHYSICIAN ASSISTANT

## 2022-12-19 PROCEDURE — 99999 PR PBB SHADOW E&M-EST. PATIENT-LVL III: CPT | Mod: PBBFAC,,, | Performed by: PHYSICIAN ASSISTANT

## 2022-12-19 PROCEDURE — 73070 X-RAY EXAM OF ELBOW: CPT | Mod: 26,LT,, | Performed by: RADIOLOGY

## 2022-12-19 PROCEDURE — 73070 XR ELBOW 2 VIEWS LEFT: ICD-10-PCS | Mod: 26,LT,, | Performed by: RADIOLOGY

## 2022-12-19 PROCEDURE — 1160F RVW MEDS BY RX/DR IN RCRD: CPT | Mod: CPTII,S$GLB,, | Performed by: PHYSICIAN ASSISTANT

## 2022-12-19 PROCEDURE — 73070 X-RAY EXAM OF ELBOW: CPT | Mod: TC,LT

## 2022-12-19 PROCEDURE — 3008F BODY MASS INDEX DOCD: CPT | Mod: CPTII,S$GLB,, | Performed by: PHYSICIAN ASSISTANT

## 2022-12-19 PROCEDURE — 1159F PR MEDICATION LIST DOCUMENTED IN MEDICAL RECORD: ICD-10-PCS | Mod: CPTII,S$GLB,, | Performed by: PHYSICIAN ASSISTANT

## 2022-12-19 PROCEDURE — 1159F MED LIST DOCD IN RCRD: CPT | Mod: CPTII,S$GLB,, | Performed by: PHYSICIAN ASSISTANT

## 2022-12-19 PROCEDURE — 99999 PR PBB SHADOW E&M-EST. PATIENT-LVL III: ICD-10-PCS | Mod: PBBFAC,,, | Performed by: PHYSICIAN ASSISTANT

## 2022-12-19 PROCEDURE — 99024 POSTOP FOLLOW-UP VISIT: CPT | Mod: S$GLB,,, | Performed by: PHYSICIAN ASSISTANT

## 2022-12-19 PROCEDURE — 99024 PR POST-OP FOLLOW-UP VISIT: ICD-10-PCS | Mod: S$GLB,,, | Performed by: PHYSICIAN ASSISTANT

## 2022-12-19 PROCEDURE — 3008F PR BODY MASS INDEX (BMI) DOCUMENTED: ICD-10-PCS | Mod: CPTII,S$GLB,, | Performed by: PHYSICIAN ASSISTANT

## 2022-12-19 RX ORDER — TRAMADOL HYDROCHLORIDE 50 MG/1
50-100 TABLET ORAL EVERY 6 HOURS PRN
Qty: 21 TABLET | Refills: 0 | Status: SHIPPED | OUTPATIENT
Start: 2022-12-19 | End: 2023-01-01 | Stop reason: SDUPTHER

## 2022-12-19 RX ORDER — OXYCODONE AND ACETAMINOPHEN 10; 325 MG/1; MG/1
TABLET ORAL
Qty: 21 TABLET | Refills: 0 | Status: SHIPPED | OUTPATIENT
Start: 2022-12-19 | End: 2023-01-01 | Stop reason: SDUPTHER

## 2022-12-20 NOTE — PROGRESS NOTES
Chief Complaint:  left elbow pain    HISTORY OF PRESENT ILLNESS:   Pt is here today for post-operative followup of distal biceps repair.  he is doing well.  We have reviewed patient's findings and discussed plan of care and future treatment options.      Tolerating pain well. No longer taking narcotics.   Wearing splint which is in place.  Patient reports that he tripped about 1 week ago and caught himself before falling to the ground. He reports that his left arm did jerk which caused severe pain. He denies his surgical arm contacting anything around him or the ground.     He does have some numbness of his lateral forearm that does not go into his hand or fingers.     DATE OF PROCEDURE: 11/01/2022  SURGEON: Jossy Samayoa M.D.   PROCEDURE PERFORMED:    1. Open repair of Left distal biceps tendon (CPT 08594).       Review of Systems   Constitution: Negative. Negative for chills, fever and night sweats.   HENT: Negative for congestion and headaches.    Eyes: Negative for blurred vision, left vision loss and right vision loss.   Cardiovascular: Negative for chest pain and syncope.   Respiratory: Negative for cough and shortness of breath.    Endocrine: Negative for polydipsia, polyphagia and polyuria.   Hematologic/Lymphatic: Negative for bleeding problem. Does not bruise/bleed easily.   Skin: Negative for dry skin, itching and rash.   Musculoskeletal: Negative for falls and muscle weakness.   Gastrointestinal: Negative for abdominal pain and bowel incontinence.   Genitourinary: Negative for bladder incontinence and nocturia.   Neurological: Negative for disturbances in coordination, loss of balance and seizures.   Psychiatric/Behavioral: Negative for depression. The patient does not have insomnia.    Allergic/Immunologic: Negative for hives and persistent infections.                                                                                 PHYSICAL EXAMINATION:     Incision sites healed well  No evidence of any  erythema, infection or induration  elbow Range of motion Not tested  Minimal effusion  2+ Radial pulses  Mild swelling    Biceps tendon appears to be intact on palpation of distal upper arm. .   No amira deformity noted.                                                                                ASSESSMENT:                                                                                                                                               1. Status post above, doing well.   2. Left elbow pain  Concern for re-rupture of his distal biceps following new trauma.                                                                                                                               PLAN:                                                                                                                                                     1. Doing well. Wean narcotics. Refilled pain medications.   2. Placed in hinged elbow brace set and locked at 90 degrees until he is 4 weeks post-op  Will not start PT and unlocking elbow brace until 1/5/23 which is 4 weeks post-op.  3. I have discussed return to activity in detail. No lifting over 1 pound with surgical arm. No stressing upper arm with any force.   4. Patient will see us back in 4 weeks.                                      5. All questions were answered, surgical technique was reviewed and interpreted, and patient should contact us with any questions or concerns in the interim.

## 2023-01-14 ENCOUNTER — OFFICE VISIT (OUTPATIENT)
Dept: URGENT CARE | Facility: CLINIC | Age: 43
End: 2023-01-14
Payer: COMMERCIAL

## 2023-01-14 VITALS
DIASTOLIC BLOOD PRESSURE: 110 MMHG | HEIGHT: 66 IN | BODY MASS INDEX: 28.12 KG/M2 | WEIGHT: 175 LBS | HEART RATE: 91 BPM | SYSTOLIC BLOOD PRESSURE: 156 MMHG | OXYGEN SATURATION: 98 % | TEMPERATURE: 98 F | RESPIRATION RATE: 18 BRPM

## 2023-01-14 DIAGNOSIS — M79.672 PAIN IN LEFT FOOT: Primary | ICD-10-CM

## 2023-01-14 PROCEDURE — 1159F MED LIST DOCD IN RCRD: CPT | Mod: CPTII,S$GLB,, | Performed by: PHYSICIAN ASSISTANT

## 2023-01-14 PROCEDURE — 3008F BODY MASS INDEX DOCD: CPT | Mod: CPTII,S$GLB,, | Performed by: PHYSICIAN ASSISTANT

## 2023-01-14 PROCEDURE — 99202 OFFICE O/P NEW SF 15 MIN: CPT | Mod: S$GLB,,, | Performed by: PHYSICIAN ASSISTANT

## 2023-01-14 PROCEDURE — 3008F PR BODY MASS INDEX (BMI) DOCUMENTED: ICD-10-PCS | Mod: CPTII,S$GLB,, | Performed by: PHYSICIAN ASSISTANT

## 2023-01-14 PROCEDURE — 1160F RVW MEDS BY RX/DR IN RCRD: CPT | Mod: CPTII,S$GLB,, | Performed by: PHYSICIAN ASSISTANT

## 2023-01-14 PROCEDURE — 3080F PR MOST RECENT DIASTOLIC BLOOD PRESSURE >= 90 MM HG: ICD-10-PCS | Mod: CPTII,S$GLB,, | Performed by: PHYSICIAN ASSISTANT

## 2023-01-14 PROCEDURE — 3080F DIAST BP >= 90 MM HG: CPT | Mod: CPTII,S$GLB,, | Performed by: PHYSICIAN ASSISTANT

## 2023-01-14 PROCEDURE — 3077F PR MOST RECENT SYSTOLIC BLOOD PRESSURE >= 140 MM HG: ICD-10-PCS | Mod: CPTII,S$GLB,, | Performed by: PHYSICIAN ASSISTANT

## 2023-01-14 PROCEDURE — 3077F SYST BP >= 140 MM HG: CPT | Mod: CPTII,S$GLB,, | Performed by: PHYSICIAN ASSISTANT

## 2023-01-14 PROCEDURE — 99202 PR OFFICE/OUTPT VISIT, NEW, LEVL II, 15-29 MIN: ICD-10-PCS | Mod: S$GLB,,, | Performed by: PHYSICIAN ASSISTANT

## 2023-01-14 PROCEDURE — 1159F PR MEDICATION LIST DOCUMENTED IN MEDICAL RECORD: ICD-10-PCS | Mod: CPTII,S$GLB,, | Performed by: PHYSICIAN ASSISTANT

## 2023-01-14 PROCEDURE — 1160F PR REVIEW ALL MEDS BY PRESCRIBER/CLIN PHARMACIST DOCUMENTED: ICD-10-PCS | Mod: CPTII,S$GLB,, | Performed by: PHYSICIAN ASSISTANT

## 2023-01-14 RX ORDER — PREDNISONE 10 MG/1
TABLET ORAL
Qty: 24 TABLET | Refills: 0 | Status: SHIPPED | OUTPATIENT
Start: 2023-01-14 | End: 2023-01-23

## 2023-01-14 NOTE — PROGRESS NOTES
"Subjective:       Patient ID: Felix Stern is a 42 y.o. male.    Vitals:  height is 5' 6" (1.676 m) and weight is 79.4 kg (175 lb). His oral temperature is 97.9 °F (36.6 °C). His blood pressure is 156/110 (abnormal) and his pulse is 91. His respiration is 18 and oxygen saturation is 98%.     Chief Complaint: Gout    This is a 42 y.o. male who presents today with a chief complaint of possible  gout. Patient has been having pain and swelling in his left foot. Patient does not recall injuring his foot. Pain started first on last Saturday. The swelling started on Tuesday. Patient took 3 days of prednisone starting on Wednesday. Swelling went down but returned on today.      Other  This is a new problem. The current episode started in the past 7 days. The problem occurs constantly. The problem has been gradually worsening. Associated symptoms include myalgias. Pertinent negatives include no arthralgias, chest pain, chills, diaphoresis, fatigue, fever, joint swelling or rash. The symptoms are aggravated by walking. Treatments tried: Naproxen and prednisone. The treatment provided mild relief.   Constitution: Negative for appetite change, chills, sweating, fatigue, fever and unexpected weight change.   Cardiovascular:  Negative for chest pain.   Respiratory:  Negative for shortness of breath.    Musculoskeletal:  Positive for pain, arthritis, pain with walking and muscle ache. Negative for trauma, joint pain, joint swelling, abnormal ROM of joint and muscle cramps.   Skin:  Negative for color change, pale, rash, erythema and bruising.   Neurological:  Negative for altered mental status.   Psychiatric/Behavioral:  Negative for altered mental status.    Past Medical History:   Diagnosis Date    GERD (gastroesophageal reflux disease)        Past Surgical History:   Procedure Laterality Date    HAND SURGERY      REPAIR,TENDON,DISTAL PROXIMAL Left 12/8/2022    Procedure: REPAIR,TENDON,DISTAL PROXIMAL;  Surgeon: Jossy " MD Danita;  Location: [facility];  Service: Orthopedics;  Laterality: Left;  Distal bicep repair    VASECTOMY         Family History   Problem Relation Age of Onset    Colon cancer Neg Hx     Esophageal cancer Neg Hx     Stomach cancer Neg Hx     Rectal cancer Neg Hx        Social History     Socioeconomic History    Marital status:    Tobacco Use    Smoking status: Never    Smokeless tobacco: Never   Substance and Sexual Activity    Alcohol use: Yes     Comment: occasionally    Drug use: No       Current Outpatient Medications   Medication Sig Dispense Refill    acyclovir (ZOVIRAX) 400 MG tablet       citalopram (CELEXA) 20 MG tablet Take 20 mg by mouth once daily.      esomeprazole magnesium (NEXIUM) 10 mg suspension Take 10 mg by mouth before breakfast.      famotidine (PEPCID) 10 MG tablet Take 10 mg by mouth 2 (two) times daily.      multivitamin (THERAGRAN) per tablet Take 1 tablet by mouth once daily.      naproxen (NAPROSYN) 500 MG tablet Take 1 tablet (500 mg total) by mouth every 12 (twelve) hours. Take with food. Starting on post-op day 5 for 21 days 42 tablet 0    oxyCODONE-acetaminophen (PERCOCET)  mg per tablet Take 1 tablet by mouth every 4-6 hours as needed for pain. Take stool softener with this medication. 21 tablet 0    traMADoL (ULTRAM) 50 mg tablet Take 1-2 tablets ( mg total) by mouth every 6 (six) hours as needed for Pain. 21 tablet 0    aspirin (ECOTRIN) 81 MG EC tablet Take 1 tablet (81 mg total) by mouth once daily. For 4 weeks starting the day after surgery. 28 tablet 0    ibuprofen (ADVIL,MOTRIN) 600 MG tablet Take 600 mg by mouth 3 (three) times daily.      pantoprazole (PROTONIX) 40 MG tablet Take 40 mg by mouth once daily.      predniSONE (DELTASONE) 10 MG tablet Take 4 tablets (40 mg total) by mouth once daily for 3 days, THEN 3 tablets (30 mg total) once daily for 2 days, THEN 2 tablets (20 mg total) once daily for 2 days, THEN 1 tablet (10 mg total) once daily for 2  days. 24 tablet 0    VENTOLIN HFA 90 mcg/actuation inhaler        No current facility-administered medications for this visit.       Review of patient's allergies indicates:  No Known Allergies      Objective:      Physical Exam   Constitutional: He is oriented to person, place, and time.  Non-toxic appearance. He does not appear ill. No distress. normal  HENT:   Head: Normocephalic and atraumatic.   Ears:   Right Ear: External ear normal.   Left Ear: External ear normal.   Eyes: Conjunctivae are normal.   Cardiovascular: Normal pulses.   Pulses:       Dorsalis pedis pulses are 2+ on the left side.        Posterior tibial pulses are 2+ on the left side.   Pulmonary/Chest: Effort normal. No respiratory distress.   Abdominal: Normal appearance.   Musculoskeletal: Normal range of motion.         General: Tenderness present. No swelling, deformity or signs of injury. Normal range of motion.      Left ankle: Normal.      Right lower leg: No edema.      Left lower leg: No edema.      Left foot: Normal range of motion and normal capillary refill. Tenderness, swelling and bunion present. No bony tenderness, crepitus, deformity, foot drop or prominent metatarsal heads. Left 2nd toe: Exhibits tenderness. There is effusion present. No bruising, atrophy or scars present. Plantar foot sensation: normal.        Feet:    Neurological: He is alert and oriented to person, place, and time.   Skin: Skin is warm, dry and not diaphoretic. Capillary refill takes less than 2 seconds. No erythema   Nursing note and vitals reviewed.      Assessment:       1. Pain in left foot          Plan:         Pain in left foot  -     predniSONE (DELTASONE) 10 MG tablet; Take 4 tablets (40 mg total) by mouth once daily for 3 days, THEN 3 tablets (30 mg total) once daily for 2 days, THEN 2 tablets (20 mg total) once daily for 2 days, THEN 1 tablet (10 mg total) once daily for 2 days.  Dispense: 24 tablet; Refill: 0    - reviewed most recent foot xrays  which revealed hx of mild DJD.   I have reviewed the patient chart and pertinent past imaging/labs.  Patient Instructions   Take steroids with food, no ibuprofen use. No xray indicated due to no trauma. If your symptoms persist please follow up with urgert care or primary care provider.

## 2023-01-14 NOTE — PATIENT INSTRUCTIONS
Take steroids with food, no ibuprofen use. No xray indicated due to no trauma. If your symptoms persist please follow up with urgert care or primary care provider.

## 2023-01-17 ENCOUNTER — CLINICAL SUPPORT (OUTPATIENT)
Dept: REHABILITATION | Facility: HOSPITAL | Age: 43
End: 2023-01-17
Payer: COMMERCIAL

## 2023-01-17 DIAGNOSIS — R29.898 WEAKNESS OF LEFT UPPER EXTREMITY: ICD-10-CM

## 2023-01-17 DIAGNOSIS — M25.622 DECREASED RANGE OF MOTION OF LEFT ELBOW: Primary | ICD-10-CM

## 2023-01-17 DIAGNOSIS — R52 PAIN AGGRAVATED BY LIFTING: ICD-10-CM

## 2023-01-17 PROCEDURE — 97164 PT RE-EVAL EST PLAN CARE: CPT

## 2023-01-17 PROCEDURE — 97110 THERAPEUTIC EXERCISES: CPT

## 2023-01-17 NOTE — PROGRESS NOTES
[facility]  Physical Therapy  Re-Evaluation and Assessment    Name: Felix Issa [facility]  Clinic Number: [id number]    Therapy Diagnosis:   Encounter Diagnoses   Name Primary?    Decreased range of motion of left elbow Yes    Weakness of left upper extremity     Pain aggravated by lifting      Physician: Marcin Viera III, *    Physician Orders: PT Eval and Treat  Medical Diagnosis from Referral:   S46.212A (ICD-10-CM) - Rupture of left distal biceps tendon, initial encounter   G89.18 (ICD-10-CM) - Post-operative pain     Evaluation Date: 1/17/2023  Authorization Period Expiration: 06/01/2023  Plan of Care Expiration: 06/01/2023  Visit # / Visits authorized: 1/ 20    Time In: 0815  Time Out: 0850  Total Billable Time: 35 minutes    Precautions: Standard    DOS: 12/8/2022    PROCEDURE PERFORMED:    1. Open revision repair of Left distal biceps tendon (CPT 86316). (complex, -22 modifier)  2. Open Lysis of adhesions to left elbow  3. Removal of hardware left elbow     POSTOPERATIVE CARE: Will keep patient in splint for approximately 14 days. Then brace locked at 90 x 14 days.  At that time, will take out and begin dynamic splinting.      Possible staged need for allograft recon discussed due to the shortened tendon with scar.     Keep at  90° x 4 weeks, then increase range of motion (extension) by 10° per week with only forearm rotation allowed with fully elbow flexed X4 weeks.    Subjective     Date of onset: 12/8/2022  History of current condition - Felix presents today 5 weeks s/p above listed procedure. This was a revision from a previously failed distal biceps tendon repair, and he was told to remain in his locked brace at 90 for 4 weeks s/p surgery. He missed his appointment last week due to personal matters. He has no real pain but does describe mild tightness in his bicep. Patient denies fever, chills, body aches, vomiting, or general malaise. He also denies N&T or referral of  sxs. He reports being compliant with brace use.      Imaging   MRI ELBOW WITHOUT CONTRAST LEFT     CLINICAL HISTORY:  evalute for distal biceps tendon rupture;  Strain of muscle, fascia and tendon of other parts of biceps, left arm, initial encounter     TECHNIQUE:  MRI of left elbow performed without contrast per routine protocol.     COMPARISON:  09/27/2022     FINDINGS:  Bone: No fracture or infiltrative process.     Tendons: The common flexor and extensor tendons are intact.  There is an insertional tear of the biceps brachii tendon.  Although few fibers are seen at the radial tuberosity inferior aspect, these are likely dysfunctional and tear is essentially complete.  The tendon stump is redundant and thickened, likely underlying tendinosis.  There is edema along the lacertus fibrosus.  There is minimal retraction.  Brachialis and triceps brachii tendons are intact.  Muscle bulk is normal.     Ligaments: Unremarkable non-arthrographic examination.     Joints: No effusion.  No intra-articular bodies.  Cartilage is maintained.     Miscellaneous: Ulnar nerve demonstrates normal course, caliber, and signal.     Impression:     1. Essentially complete tear of the insertional biceps tendon with minimal retraction.    XR ELBOW 2 VIEWS LEFT     TECHNIQUE:  Two views of the left elbow were obtained, with AP and lateral projections submitted.     COMPARISON:  Comparison is made to 12/05/2022.     FINDINGS:  Postoperative changes are again identified relating to a prior distal biceps tendon repair, with note made of the fact that the electronic medical record indicates an interval surgical revision of this repair on 12/08/2022.  No unusual postoperative findings or significant detrimental interval change since 12/05/2022 is appreciated.    Prior Therapy: For previous surgery   Exercise Routine/Sport Participation: None  Social History: Lives at home  Occupation: Own own business   Prior Level of Function:  Unrestricted  Current Level of Function: Limited with all functional tasks involving use of UE.     Pain:  Current 0/10, worst 5/10, best 0/10   Location: left elbow  Description: Sharp  Aggravating Factors: Lifting  Easing Factors: pain medication and rest    Pts goals: Return to PLOF with full use of L UE to allow him to perform his work without limitation      Medical History:   Past Medical History:   Diagnosis Date    GERD (gastroesophageal reflux disease)        Surgical History:   Felix Stern  has a past surgical history that includes Hand surgery; Vasectomy; and repair,tendon,distal proximal (Left, 12/8/2022).    Medications:   Felix has a current medication list which includes the following prescription(s): acyclovir, aspirin, citalopram, esomeprazole magnesium, famotidine, ibuprofen, multivitamin, naproxen, oxycodone-acetaminophen, pantoprazole, prednisone, tramadol, and ventolin hfa.    Allergies:   Review of patient's allergies indicates:  No Known Allergies     Objective     Active Range of Motion:   Elbow Left Right   Flexion 90 150   extension 70 0   pronation 90 90   supination 80 80              Passive Range of Motion:   Elbow Left Right   Flexion 100 155   Extension 30 0   Pronation 90 90   Supination 80 80         Upper Extremity Strength  Not performed secondary to post-op status      Palpation:  Mild TTP at incision site            Special Tests:   NT secondary to post-op status      CMS Impairment/Limitation/Restriction for FOTO Elbow Survey    Therapist reviewed FOTO scores for Felix Stern on 1/17/2023.   FOTO documents entered into [employer] - see Media section.    Limitation Score: See media section%  Category: Carrying       Treatment     Treatment Time In: 0835  Treatment Time Out: 0850  Total Treatment time separate from Evaluation: 15 minutes    Felix received therapeutic exercises to develop ROM and flexibility for 15 minutes including:  Elbow Flexion ext AROM in  "brace x30  Elbow Pro/sup in brace x30   Wrist Flx/Ext AROM x30  Lat stretch in brace 4x30"      Home Exercises and Patient Education Provided     Education provided:   - Tissue healing timelines, expected progression of AROM and initiation of strengthening, post-op precautions  - Prognosis, activity modification, goals for therapy, role of therapy for care, exercises/HEP    Written Home Exercises Provided: See treatment section.  Exercises were reviewed and Felix was able to demonstrate them prior to the end of the session.   Pt received a written copy of exercises to perform at home. Felix demonstrated good  understanding of the education provided.     See EMR under patient instructions for exercises given.     Assessment     Felix is a 42 y.o. male referred to outpatient Physical Therapy with decreased left elbow range of motion, decreased strength, motor control impairments, pain, and swelling 5 weeks s/p L distal biceps tendon revision with resultant functional impairments in all occupation tasks and functional ADL's involving use of L UE. ROM will be progressed much more slowly per surgeon's protocol which are outlined above. POC has been updated to reflect his change of status.       Pt will benefit from skilled outpatient Physical Therapy to address the deficits stated above and in the chart below, provide pt/family education, and to maximize pt's level of independence. Pt prognosis is Good.     Plan of care discussed with patient: Yes  Pt's spiritual, cultural and educational needs considered and patient is agreeable to the plan of care and goals as stated below:       Anticipated Barriers for therapy: None      Medical Necessity is demonstrated by the following  History  Co-morbidities and personal factors that may impact the plan of care Co-morbidities:   None    Personal Factors:   no deficits     low   Examination  Body Structures and Functions, activity limitations and participation restrictions that " may impact the plan of care Body Regions:   neck  upper extremities    Body Systems:    gross symmetry  ROM  strength  gross coordinated movement  motor control  motor learning    Participation Restrictions:   None    Activity limitations:   Learning and applying knowledge  No deficit    General Tasks and Commands  No deficit    Communication  No deficit    Mobility  lifting and carrying objects  driving (bike, car, motorcycle)    Self care  No deficit    Domestic Life  No deficit    Interactions/Relationships  No deficit    Life Areas  Occupational tasks    Community and Social Life  No deficit          moderate   Clinical Presentation stable and uncomplicated low   Decision Making/ Complexity Score: low     Goals:  Short Term Goals: 8 weeks  1. Pt will be compliant with HEP 50% of prescribed amount.   2. The pt to demo improvement in Elbow ROM to full compared to uninvolved side.   3.  Pt will tolerate being out of brace for 24 hours without pain  4. Pt will improve FOTO score to meet or exceed MCID.      Long Term Goals: 26 weeks   Pt will be compliant with % of prescribed amount.   Pt will demo  strength and isometric elbow strength within 90% LSI measured via HHD.   Pt will report ability to perform a full work day without pain or limitation.   Pt will improve FOTO score to meet or exceed predicted outcome.   The pt will report full participation in ADLs and IADLs without restrictions related to L elbow.     Plan   Plan of care Certification: 1/17/2023 to 06/01/2023.    Outpatient Physical Therapy 2 times weekly for 26 weeks to include the following interventions: Electrical Stimulation IFC/TENS, Manual Therapy, Moist Heat/ Ice, Neuromuscular Re-ed, Patient Education, Self Care, Therapeutic Activities, Therapeutic Exercise, and Dry Needling .     Oren Holcomb, PT , DPT, SCS

## 2023-01-18 ENCOUNTER — OFFICE VISIT (OUTPATIENT)
Dept: SPORTS MEDICINE | Facility: CLINIC | Age: 43
End: 2023-01-18
Payer: COMMERCIAL

## 2023-01-18 VITALS
SYSTOLIC BLOOD PRESSURE: 160 MMHG | DIASTOLIC BLOOD PRESSURE: 110 MMHG | HEART RATE: 83 BPM | BODY MASS INDEX: 28.08 KG/M2 | WEIGHT: 174 LBS

## 2023-01-18 DIAGNOSIS — M25.522 LEFT ELBOW PAIN: ICD-10-CM

## 2023-01-18 DIAGNOSIS — S46.212D RUPTURE OF LEFT DISTAL BICEPS TENDON, SUBSEQUENT ENCOUNTER: ICD-10-CM

## 2023-01-18 DIAGNOSIS — Z09 S/P ORTHOPEDIC SURGERY, FOLLOW-UP EXAM: Primary | ICD-10-CM

## 2023-01-18 PROCEDURE — 3077F PR MOST RECENT SYSTOLIC BLOOD PRESSURE >= 140 MM HG: ICD-10-PCS | Mod: CPTII,S$GLB,, | Performed by: ORTHOPAEDIC SURGERY

## 2023-01-18 PROCEDURE — 99024 POSTOP FOLLOW-UP VISIT: CPT | Mod: S$GLB,,, | Performed by: ORTHOPAEDIC SURGERY

## 2023-01-18 PROCEDURE — 99999 PR PBB SHADOW E&M-EST. PATIENT-LVL III: ICD-10-PCS | Mod: PBBFAC,,, | Performed by: ORTHOPAEDIC SURGERY

## 2023-01-18 PROCEDURE — 3008F PR BODY MASS INDEX (BMI) DOCUMENTED: ICD-10-PCS | Mod: CPTII,S$GLB,, | Performed by: ORTHOPAEDIC SURGERY

## 2023-01-18 PROCEDURE — 99999 PR PBB SHADOW E&M-EST. PATIENT-LVL III: CPT | Mod: PBBFAC,,, | Performed by: ORTHOPAEDIC SURGERY

## 2023-01-18 PROCEDURE — 99024 PR POST-OP FOLLOW-UP VISIT: ICD-10-PCS | Mod: S$GLB,,, | Performed by: ORTHOPAEDIC SURGERY

## 2023-01-18 PROCEDURE — 3080F PR MOST RECENT DIASTOLIC BLOOD PRESSURE >= 90 MM HG: ICD-10-PCS | Mod: CPTII,S$GLB,, | Performed by: ORTHOPAEDIC SURGERY

## 2023-01-18 PROCEDURE — 3077F SYST BP >= 140 MM HG: CPT | Mod: CPTII,S$GLB,, | Performed by: ORTHOPAEDIC SURGERY

## 2023-01-18 PROCEDURE — 3080F DIAST BP >= 90 MM HG: CPT | Mod: CPTII,S$GLB,, | Performed by: ORTHOPAEDIC SURGERY

## 2023-01-18 PROCEDURE — 3008F BODY MASS INDEX DOCD: CPT | Mod: CPTII,S$GLB,, | Performed by: ORTHOPAEDIC SURGERY

## 2023-01-18 NOTE — PROGRESS NOTES
Chief Complaint:  left elbow pain    HISTORY OF PRESENT ILLNESS:   Pt is here today for post-operative followup of distal biceps repair.  he is doing well.  We have reviewed patient's findings and discussed plan of care and future treatment options.      weeks post op  Tolerating pain well. No longer taking narcotics.   Wearing splint which is in place.  Patient reports that he tripped about 1 week ago and caught himself before falling to the ground. He reports that his left arm did jerk which caused severe pain. He denies his surgical arm contacting anything around him or the ground.     He does have some numbness of his lateral forearm that does not go into his hand or fingers.     DATE OF PROCEDURE: 11/01/2022  SURGEON: Jossy Samayoa M.D.   PROCEDURE PERFORMED:    1. Open repair of Left distal biceps tendon (CPT 66579).       Review of Systems   Constitution: Negative. Negative for chills, fever and night sweats.   HENT: Negative for congestion and headaches.    Eyes: Negative for blurred vision, left vision loss and right vision loss.   Cardiovascular: Negative for chest pain and syncope.   Respiratory: Negative for cough and shortness of breath.    Endocrine: Negative for polydipsia, polyphagia and polyuria.   Hematologic/Lymphatic: Negative for bleeding problem. Does not bruise/bleed easily.   Skin: Negative for dry skin, itching and rash.   Musculoskeletal: Negative for falls and muscle weakness.   Gastrointestinal: Negative for abdominal pain and bowel incontinence.   Genitourinary: Negative for bladder incontinence and nocturia.   Neurological: Negative for disturbances in coordination, loss of balance and seizures.   Psychiatric/Behavioral: Negative for depression. The patient does not have insomnia.    Allergic/Immunologic: Negative for hives and persistent infections.                                                                                 PHYSICAL EXAMINATION:     Incision sites healed well  No  evidence of any erythema, infection or induration  elbow Range of motion Not tested  Minimal effusion  2+ Radial pulses  Mild swelling    Biceps tendon appears to be intact on palpation of distal upper arm. .   No amira deformity noted.                                                                                ASSESSMENT:                                                                                                                                               1. Status post above, doing well.   2. Left elbow pain  Concern for re-rupture of his distal biceps following new trauma.                                                                                                                               PLAN:                                                                                                                                                     1. Doing well. Wean narcotics. Refilled pain medications.   2. Placed in hinged elbow brace set and locked at 90 degrees until he is 4 weeks post-op  Will not start PT and unlocking elbow brace until 1/5/23 which is 4 weeks post-op.  3. I have discussed return to activity in detail. No lifting over 1 pound with surgical arm. No stressing upper arm with any force.   4. Patient will see us back in 4 weeks.                                      5. All questions were answered, surgical technique was reviewed and interpreted, and patient should contact us with any questions or concerns in the interim.

## 2023-01-18 NOTE — PROGRESS NOTES
Chief Complaint:  left elbow pain    HISTORY OF PRESENT ILLNESS:   Pt is here today for post-operative followup of distal biceps repair.  he is doing well.  We have reviewed patient's findings and discussed plan of care and future treatment options.      Patient has been attending physical therapy at the [facility] location, working with [facility]. His first visit was yesterday     He does have some numbness of his lateral forearm that does not go into his hand or fingers. This has improved some but is not yet resolved     DATE OF PROCEDURE: 11/01/2022  SURGEON: Jossy Samayoa M.D.   PROCEDURE PERFORMED:    1. Open repair of Left distal biceps tendon (CPT 06315).       Review of Systems   Constitution: Negative. Negative for chills, fever and night sweats.   HENT: Negative for congestion and headaches.    Eyes: Negative for blurred vision, left vision loss and right vision loss.   Cardiovascular: Negative for chest pain and syncope.   Respiratory: Negative for cough and shortness of breath.    Endocrine: Negative for polydipsia, polyphagia and polyuria.   Hematologic/Lymphatic: Negative for bleeding problem. Does not bruise/bleed easily.   Skin: Negative for dry skin, itching and rash.   Musculoskeletal: Negative for falls and muscle weakness.   Gastrointestinal: Negative for abdominal pain and bowel incontinence.   Genitourinary: Negative for bladder incontinence and nocturia.   Neurological: Negative for disturbances in coordination, loss of balance and seizures.   Psychiatric/Behavioral: Negative for depression. The patient does not have insomnia.    Allergic/Immunologic: Negative for hives and persistent infections.                                                                                 PHYSICAL EXAMINATION:     Incision sites healed well  No evidence of any erythema, infection or induration  elbow Range of motion: flexion: 140, Extension: lacking 35  No effusion  2+ Radial pulses  Minimal to no  swelling                                                                               ASSESSMENT:                                                                                                                                               1. Status post above, doing well.                                                                                                                               PLAN:                                                                                                                                                     1. Continue PT, case discussed with physical therapist   2. Continue hinged elbow brace, unlock per therapy progression   3. I have discussed return to activity in detail. No lifting over 1 pound with surgical arm. No stressing upper arm with any force.   4. Patient will see us back in 3 weeks.                                      5. All questions were answered, surgical technique was reviewed and interpreted, and patient should contact us with any questions or concerns in the interim.                                                                                                   POSTOPERATIVE CARE: Will keep patient in splint for approximately 14 days. Then brace locked at 90 x 14 days.  At that time, will take out and begin dynamic splinting.      Possible staged need for allograft recon discussed due to the shortened tendon with scar.     Keep at  90° x 4 weeks, then increase range of motion (extension) by 10° per week with only forearm rotation allowed with fully elbow flexed X4 weeks.

## 2023-01-23 ENCOUNTER — OFFICE VISIT (OUTPATIENT)
Dept: ORTHOPEDICS | Facility: CLINIC | Age: 43
End: 2023-01-23
Payer: COMMERCIAL

## 2023-01-23 ENCOUNTER — HOSPITAL ENCOUNTER (OUTPATIENT)
Dept: RADIOLOGY | Facility: HOSPITAL | Age: 43
Discharge: HOME | End: 2023-01-23
Attending: ORTHOPAEDIC SURGERY
Payer: COMMERCIAL

## 2023-01-23 VITALS — WEIGHT: 173.94 LBS | BODY MASS INDEX: 27.95 KG/M2 | HEIGHT: 66 IN

## 2023-01-23 DIAGNOSIS — M20.21 HALLUX RIGIDUS OF BOTH FEET: ICD-10-CM

## 2023-01-23 DIAGNOSIS — R52 PAIN: ICD-10-CM

## 2023-01-23 DIAGNOSIS — M20.22 HALLUX RIGIDUS OF BOTH FEET: ICD-10-CM

## 2023-01-23 DIAGNOSIS — M84.375A STRESS FRACTURE OF METATARSAL BONE OF LEFT FOOT, INITIAL ENCOUNTER: Primary | ICD-10-CM

## 2023-01-23 PROCEDURE — 73630 XR FOOT COMPLETE 3 VIEW LEFT: ICD-10-PCS | Mod: 26,LT,, | Performed by: RADIOLOGY

## 2023-01-23 PROCEDURE — 73630 X-RAY EXAM OF FOOT: CPT | Mod: TC,LT

## 2023-01-23 PROCEDURE — 1160F PR REVIEW ALL MEDS BY PRESCRIBER/CLIN PHARMACIST DOCUMENTED: ICD-10-PCS | Mod: CPTII,S$GLB,, | Performed by: ORTHOPAEDIC SURGERY

## 2023-01-23 PROCEDURE — 1159F MED LIST DOCD IN RCRD: CPT | Mod: CPTII,S$GLB,, | Performed by: ORTHOPAEDIC SURGERY

## 2023-01-23 PROCEDURE — 99999 PR PBB SHADOW E&M-EST. PATIENT-LVL III: ICD-10-PCS | Mod: PBBFAC,,, | Performed by: ORTHOPAEDIC SURGERY

## 2023-01-23 PROCEDURE — 99213 OFFICE O/P EST LOW 20 MIN: CPT | Mod: S$GLB,,, | Performed by: ORTHOPAEDIC SURGERY

## 2023-01-23 PROCEDURE — 99213 PR OFFICE/OUTPT VISIT, EST, LEVL III, 20-29 MIN: ICD-10-PCS | Mod: S$GLB,,, | Performed by: ORTHOPAEDIC SURGERY

## 2023-01-23 PROCEDURE — 3008F BODY MASS INDEX DOCD: CPT | Mod: CPTII,S$GLB,, | Performed by: ORTHOPAEDIC SURGERY

## 2023-01-23 PROCEDURE — 73630 X-RAY EXAM OF FOOT: CPT | Mod: 26,LT,, | Performed by: RADIOLOGY

## 2023-01-23 PROCEDURE — 1159F PR MEDICATION LIST DOCUMENTED IN MEDICAL RECORD: ICD-10-PCS | Mod: CPTII,S$GLB,, | Performed by: ORTHOPAEDIC SURGERY

## 2023-01-23 PROCEDURE — 3008F PR BODY MASS INDEX (BMI) DOCUMENTED: ICD-10-PCS | Mod: CPTII,S$GLB,, | Performed by: ORTHOPAEDIC SURGERY

## 2023-01-23 PROCEDURE — 1160F RVW MEDS BY RX/DR IN RCRD: CPT | Mod: CPTII,S$GLB,, | Performed by: ORTHOPAEDIC SURGERY

## 2023-01-23 PROCEDURE — 99999 PR PBB SHADOW E&M-EST. PATIENT-LVL III: CPT | Mod: PBBFAC,,, | Performed by: ORTHOPAEDIC SURGERY

## 2023-01-23 NOTE — PROGRESS NOTES
Felix Stern  Checked in 21 minutes late for appointment    This is a 42-year-old male who I have seen in the past for hallux rigidus of his right big toe.  His last visit with me was on 10/27/2020 but he returns today and reports that about two weeks ago developed insidious pain and swelling in the middle of his left foot.  States he notices pain after getting out of bed and has been limping over the last two weeks.  He went to urgent care about eight days ago and it was felt he may be having an inflammatory flare-up such as gout.  He was placed on a Medrol pack and naproxen but his symptoms are still present.  He points to the mid part of his foot where he states he feels a lump.  He also reports that his left big toe had been bothering him more prior to the onset of his pain in the middle of his foot.    Examination:  Walks in today with a slight antalgic gait.  Inspection of the left foot reveals some mild midfoot swelling.  On sitting exam he has some tenderness in the midfoot around the areas of the 2nd and 3rd metatarsals.  He has painless motion of his toes.  He is neurovascularly intact.      Imaging:  I ordered reviewed an x-ray of the left foot today.  X-rays reveal a complete fracture of the 2nd metatarsal distal shaft with slight angulation.  I also compared his x-rays to x-rays from over two years ago and there has been some progression of arthritis of his left big toe MTP joint with peripheral osteophyte formation.    Impression:  1. Stress fracture of metatarsal bone of left foot, initial encounter, 2nd metatarsal  X-Ray Foot Complete Left      2. 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  His history is fairly classic for a stress fracture.  I am going to dispense to him a short fracture boot and  he should avoid any weight-bearing that causes pain at the fracture site.      Follow-up in four weeks with repeat x-ray left foot

## 2023-01-24 ENCOUNTER — CLINICAL SUPPORT (OUTPATIENT)
Dept: REHABILITATION | Facility: HOSPITAL | Age: 43
End: 2023-01-24
Payer: COMMERCIAL

## 2023-01-24 DIAGNOSIS — R29.898 WEAKNESS OF LEFT UPPER EXTREMITY: ICD-10-CM

## 2023-01-24 DIAGNOSIS — M25.622 DECREASED RANGE OF MOTION OF LEFT ELBOW: Primary | ICD-10-CM

## 2023-01-24 DIAGNOSIS — R52 PAIN AGGRAVATED BY LIFTING: ICD-10-CM

## 2023-01-24 PROCEDURE — 97140 MANUAL THERAPY 1/> REGIONS: CPT

## 2023-01-24 PROCEDURE — 97110 THERAPEUTIC EXERCISES: CPT

## 2023-01-24 PROCEDURE — 97112 NEUROMUSCULAR REEDUCATION: CPT

## 2023-01-24 NOTE — PROGRESS NOTES
Physical Therapy Daily Treatment Note     Name: Felix Issa [facility]  Clinic Number: [id number]    Therapy Diagnosis:   Encounter Diagnoses   Name Primary?    Decreased range of motion of left elbow Yes    Weakness of left upper extremity     Pain aggravated by lifting      Physician: Marcin Viera III, *    Visit Date: 1/24/2023  Physician Orders: PT Eval and Treat  Medical Diagnosis from Referral:   S46.212A (ICD-10-CM) - Rupture of left distal biceps tendon, initial encounter   G89.18 (ICD-10-CM) - Post-operative pain      Evaluation Date: 1/17/2023  Authorization Period Expiration: 12/20/2023  Plan of Care Expiration: 06/01/2023  Visit # / Visits authorized: 2/ 20      Time In: 0835 (Pt arrived late)  Time Out: 0929  Total Billable Time: 54 minutes    Precautions: Standard       DOS: 12/8/2022     PROCEDURE PERFORMED:    1. Open revision repair of Left distal biceps tendon (CPT 29917). (complex, -22 modifier)  2. Open Lysis of adhesions to left elbow  3. Removal of hardware left elbow     POSTOPERATIVE CARE: Will keep patient in splint for approximately 14 days. Then brace locked at 90 x 14 days.  At that time, will take out and begin dynamic splinting.      Possible staged need for allograft recon discussed due to the shortened tendon with scar.     Keep at  90° x 4 weeks, then increase range of motion (extension) by 10° per week with only forearm rotation allowed with fully elbow flexed X4 weeks.    Subjective     Pt reports: His elbow feels good. He noticed a little tightness a few times but this resolves when he locks the brace to give his elbow a break, and then he is able to unlock it again without any issues.     He was compliant with home exercise program.  Response to previous treatment: Good tolerance to new ROM    Pain: Not verbalized /10  Location: L elbow     Objective     Daily Measurements:     L Elbow PROM: 0-      Daily Treatment       Felix received therapeutic exercises to develop  "strength, endurance, ROM, and flexibility for 18 minutes including:  Elbow Flexion ext AROM in brace 2x20  Elbow Pro/sup in brace 2x20  Wrist Flx/Ext AROM x30  Lat stretch in brace 4x30"    Felix received the following manual therapy techniques: were applied for 13 minutes, including:  Brace adjustment  Elbow PROM  Lat Hold/relax       Felix participated in neuromuscular re-education activities to improve: Coordination, Kinesthetic, Sense, Proprioception, and Motor Control for 23 minutes. The following activities were included:  Prone Lvl 1 LT [id number]"  Prone Lvl 2 Mt [id number]"     Home Exercises and Patient Education Provided     Education provided:   - Reviwed precautions and timelines for progression    Written Home Exercises Provided: yes.  Exercises were reviewed and Felix was able to demonstrate them prior to the end of the session.  Felix demonstrated good  understanding of the education provided.     See EMR under patient instructions for exercises given.     Assessment     Pt presents with appropriate ROM and tolerates progression of 10 degrees with no c/o pain. Intro'd scapular re-education with significant time spent on appropriate scapula control and avoiding substitution.     Felix Is progressing well towards his goals.     Pt will continue to benefit from skilled outpatient physical therapy to address the deficits listed in the problem list box on initial evaluation, provide pt/family education and to maximize pt's level of independence in the home and community environment. Pt prognosis is Good.     Pt's spiritual, cultural and educational needs considered and pt agreeable to plan of care and goals.    Anticipated barriers to physical therapy: None    Goals:  Short Term Goals: 8 weeks  1. Pt will be compliant with HEP 50% of prescribed amount.   2. The pt to demo improvement in Elbow ROM to full compared to uninvolved side.   3.  Pt will tolerate being out of brace for 24 hours without pain  4. Pt will " improve FOTO score to meet or exceed MCID.      Long Term Goals: 26 weeks   Pt will be compliant with % of prescribed amount.   Pt will demo  strength and isometric elbow strength within 90% LSI measured via HHD.   Pt will report ability to perform a full work day without pain or limitation.   Pt will improve FOTO score to meet or exceed predicted outcome.   The pt will report full participation in ADLs and IADLs without restrictions related to L elbow.     Plan     Outpatient Physical Therapy 2 times weekly for 26 weeks to include the following interventions: Electrical Stimulation IFC/TENS, Manual Therapy, Moist Heat/ Ice, Neuromuscular Re-ed, Patient Education, Self Care, Therapeutic Activities, Therapeutic Exercise, and Dry Needling .     Progress ROM 10 degrees per week per Dr. Samayoa's instructions    Oren Holcomb, PT , DPT, SCS

## 2023-01-30 ENCOUNTER — OFFICE VISIT (OUTPATIENT)
Dept: SPORTS MEDICINE | Facility: CLINIC | Age: 43
End: 2023-01-30
Payer: COMMERCIAL

## 2023-01-30 VITALS
BODY MASS INDEX: 27.8 KG/M2 | HEART RATE: 84 BPM | WEIGHT: 173 LBS | HEIGHT: 66 IN | SYSTOLIC BLOOD PRESSURE: 151 MMHG | DIASTOLIC BLOOD PRESSURE: 109 MMHG

## 2023-01-30 DIAGNOSIS — Z09 S/P ORTHOPEDIC SURGERY, FOLLOW-UP EXAM: ICD-10-CM

## 2023-01-30 DIAGNOSIS — R22.32 LOCALIZED SWELLING OF LEFT FOREARM: Primary | ICD-10-CM

## 2023-01-30 PROCEDURE — 3077F PR MOST RECENT SYSTOLIC BLOOD PRESSURE >= 140 MM HG: ICD-10-PCS | Mod: CPTII,S$GLB,, | Performed by: ORTHOPAEDIC SURGERY

## 2023-01-30 PROCEDURE — 3080F PR MOST RECENT DIASTOLIC BLOOD PRESSURE >= 90 MM HG: ICD-10-PCS | Mod: CPTII,S$GLB,, | Performed by: ORTHOPAEDIC SURGERY

## 2023-01-30 PROCEDURE — 99999 PR PBB SHADOW E&M-EST. PATIENT-LVL III: ICD-10-PCS | Mod: PBBFAC,,, | Performed by: ORTHOPAEDIC SURGERY

## 2023-01-30 PROCEDURE — 3008F PR BODY MASS INDEX (BMI) DOCUMENTED: ICD-10-PCS | Mod: CPTII,S$GLB,, | Performed by: ORTHOPAEDIC SURGERY

## 2023-01-30 PROCEDURE — 3077F SYST BP >= 140 MM HG: CPT | Mod: CPTII,S$GLB,, | Performed by: ORTHOPAEDIC SURGERY

## 2023-01-30 PROCEDURE — 3080F DIAST BP >= 90 MM HG: CPT | Mod: CPTII,S$GLB,, | Performed by: ORTHOPAEDIC SURGERY

## 2023-01-30 PROCEDURE — 99024 POSTOP FOLLOW-UP VISIT: CPT | Mod: S$GLB,,, | Performed by: ORTHOPAEDIC SURGERY

## 2023-01-30 PROCEDURE — 1159F MED LIST DOCD IN RCRD: CPT | Mod: CPTII,S$GLB,, | Performed by: ORTHOPAEDIC SURGERY

## 2023-01-30 PROCEDURE — 1159F PR MEDICATION LIST DOCUMENTED IN MEDICAL RECORD: ICD-10-PCS | Mod: CPTII,S$GLB,, | Performed by: ORTHOPAEDIC SURGERY

## 2023-01-30 PROCEDURE — 99024 PR POST-OP FOLLOW-UP VISIT: ICD-10-PCS | Mod: S$GLB,,, | Performed by: ORTHOPAEDIC SURGERY

## 2023-01-30 PROCEDURE — 99999 PR PBB SHADOW E&M-EST. PATIENT-LVL III: CPT | Mod: PBBFAC,,, | Performed by: ORTHOPAEDIC SURGERY

## 2023-01-30 PROCEDURE — 3008F BODY MASS INDEX DOCD: CPT | Mod: CPTII,S$GLB,, | Performed by: ORTHOPAEDIC SURGERY

## 2023-01-30 RX ORDER — SULFAMETHOXAZOLE AND TRIMETHOPRIM 800; 160 MG/1; MG/1
1 TABLET ORAL 2 TIMES DAILY
Qty: 14 TABLET | Refills: 0 | Status: SHIPPED | OUTPATIENT
Start: 2023-01-30 | End: 2023-03-01 | Stop reason: ALTCHOICE

## 2023-01-30 NOTE — PROGRESS NOTES
Chief Complaint:  left elbow pain    HISTORY OF PRESENT ILLNESS:   Pt is here today for post-operative followup of distal biceps repair.  he is doing well.  We have reviewed patient's findings and discussed plan of care and future treatment options.      Patient notes swelling over the posterior forearm incision site that has progressively increased over the past 2 weeks. Yesterday he describes minimal white/yellow pus draining from the proximal end of his incision that has since stopped     Patient has been attending physical therapy at the [facility] location, working with Oren [facility]. His first visit was yesterday     He does have some numbness of his lateral forearm that does not go into his hand or fingers. This has improved some but is not yet resolved     DATE OF PROCEDURE: 11/01/2022  SURGEON: Jossy Samayoa M.D.   PROCEDURE PERFORMED:    1. Open repair of Left distal biceps tendon (CPT 67488).       Review of Systems   Constitution: Negative. Negative for chills, fever and night sweats.   HENT: Negative for congestion and headaches.    Eyes: Negative for blurred vision, left vision loss and right vision loss.   Cardiovascular: Negative for chest pain and syncope.   Respiratory: Negative for cough and shortness of breath.    Endocrine: Negative for polydipsia, polyphagia and polyuria.   Hematologic/Lymphatic: Negative for bleeding problem. Does not bruise/bleed easily.   Skin: Negative for dry skin, itching and rash.   Musculoskeletal: Negative for falls and muscle weakness.   Gastrointestinal: Negative for abdominal pain and bowel incontinence.   Genitourinary: Negative for bladder incontinence and nocturia.   Neurological: Negative for disturbances in coordination, loss of balance and seizures.   Psychiatric/Behavioral: Negative for depression. The patient does not have insomnia.    Allergic/Immunologic: Negative for hives and persistent infections.                                                                                  PHYSICAL EXAMINATION:     Incision sites healed well  No evidence of any erythema, infection or induration  elbow Range of motion: flexion: 140, Extension: lacking 30  No effusion  2+ Radial pulses    + swelling at the site of the posterior forearm incision site, small scab on proximal end                                                                               ASSESSMENT:                                                                                                                                               1. Status post above, doing well.                                                                                                                               PLAN:                                                                                                                                                     1. Continue PT, case discussed with physical therapist   2. Continue hinged elbow brace, unlock per therapy progression   3. I have discussed return to activity in detail. No lifting over 1 pound with surgical arm. No stressing upper arm with any force.   4. Patient will see us back in 1 week.                                      5. Patient was placed in a bio sleeve with felt pad underneath for compression, he was placed on Bactrim DS for 7 days  6. All questions were answered, surgical technique was reviewed and interpreted, and patient should contact us with any questions or concerns in the interim.                                                                                                   POSTOPERATIVE CARE: Will keep patient in splint for approximately 14 days. Then brace locked at 90 x 14 days.  At that time, will take out and begin dynamic splinting.      Possible staged need for allograft recon discussed due to the shortened tendon with scar.     Keep at  90° x 4 weeks, then increase range of motion (extension) by 10° per week with only forearm rotation allowed  with fully elbow flexed X4 weeks.

## 2023-01-31 ENCOUNTER — PATIENT MESSAGE (OUTPATIENT)
Dept: REHABILITATION | Facility: HOSPITAL | Age: 43
End: 2023-01-31

## 2023-02-02 ENCOUNTER — CLINICAL SUPPORT (OUTPATIENT)
Dept: REHABILITATION | Facility: HOSPITAL | Age: 43
End: 2023-02-02
Payer: COMMERCIAL

## 2023-02-02 DIAGNOSIS — M25.622 DECREASED RANGE OF MOTION OF LEFT ELBOW: Primary | ICD-10-CM

## 2023-02-02 DIAGNOSIS — R52 PAIN AGGRAVATED BY LIFTING: ICD-10-CM

## 2023-02-02 DIAGNOSIS — R29.898 WEAKNESS OF LEFT UPPER EXTREMITY: ICD-10-CM

## 2023-02-02 PROCEDURE — 97140 MANUAL THERAPY 1/> REGIONS: CPT

## 2023-02-02 PROCEDURE — 97112 NEUROMUSCULAR REEDUCATION: CPT

## 2023-02-02 PROCEDURE — 97110 THERAPEUTIC EXERCISES: CPT

## 2023-02-02 NOTE — PROGRESS NOTES
Physical Therapy Daily Treatment Note     Name: Felix Issa [facility]  Clinic Number: [id number]    Therapy Diagnosis:   Encounter Diagnoses   Name Primary?    Decreased range of motion of left elbow Yes    Weakness of left upper extremity     Pain aggravated by lifting      Physician: Marcin Viera III, *    Visit Date: 2/2/2023  Physician Orders: PT Eval and Treat  Medical Diagnosis from Referral:   S46.212A (ICD-10-CM) - Rupture of left distal biceps tendon, initial encounter   G89.18 (ICD-10-CM) - Post-operative pain      Evaluation Date: 1/17/2023  Authorization Period Expiration: 12/20/2023  Plan of Care Expiration: 06/01/2023  Visit # / Visits authorized: 3 / 20 (7 total)      Time In: 0900  Time Out: 0955  Total Billable Time: 55 minutes    Precautions: Standard       DOS: 12/8/2022     PROCEDURE PERFORMED:    1. Open revision repair of Left distal biceps tendon (CPT 38825). (complex, -22 modifier)  2. Open Lysis of adhesions to left elbow  3. Removal of hardware left elbow     POSTOPERATIVE CARE: Will keep patient in splint for approximately 14 days. Then brace locked at 90 x 14 days.  At that time, will take out and begin dynamic splinting.      Possible staged need for allograft recon discussed due to the shortened tendon with scar.     Keep at  90° x 4 weeks, then increase range of motion (extension) by 10° per week with only forearm rotation allowed with fully elbow flexed X4 weeks.    Subjective     Pt reports: He saw Dr. Samayoa who was pleased with his ROM and progressed him by 10 degrees Friday. It is feeling really good to be able to extend his elbow more and he has no pain.     He was compliant with home exercise program.  Response to previous treatment: Good tolerance to new ROM    Pain: Not verbalized /10  Location: L elbow     Objective     Daily Measurements:     L Elbow PROM: 0-      Daily Treatment       Felix received therapeutic exercises to develop strength, endurance, ROM, and  "flexibility for 18 minutes including:  Elbow Flexion ext AROM in brace 2x20  Elbow Pro/sup in brace 2x20  Wrist Flx/Ext AROM x30  Lat stretch in brace 4x30"  Foam roll flexor/pronator mass x3 mins    Felix received the following manual therapy techniques: were applied for 15 minutes, including:  Brace adjustment  Elbow PROM  Lat Hold/relax   Pronator hold/relax  RH gapping       Felix participated in neuromuscular re-education activities to improve: Coordination, Kinesthetic, Sense, Proprioception, and Motor Control for 23 minutes. The following activities were included:  Prone Lvl 1 LT [id number]"  Prone Lvl 2 Mt [id number]"   Supine lat mobility drill 2x10    Home Exercises and Patient Education Provided     Education provided:   - Reviwed precautions and timelines for progression    Written Home Exercises Provided: yes.  Exercises were reviewed and Felix was able to demonstrate them prior to the end of the session.  Felix demonstrated good  understanding of the education provided.     See EMR under patient instructions for exercises given.     Assessment     Pt presents with appropriate ROM and tolerates progression to 50 degrees flexion with no c/o pain. He has full passive elbow flexion. Mod flexor/pronator stiffness that improved with hold/relax and foam rolling. Improved parascapular control today.     Felix Is progressing well towards his goals.     Pt will continue to benefit from skilled outpatient physical therapy to address the deficits listed in the problem list box on initial evaluation, provide pt/family education and to maximize pt's level of independence in the home and community environment. Pt prognosis is Good.     Pt's spiritual, cultural and educational needs considered and pt agreeable to plan of care and goals.    Anticipated barriers to physical therapy: None    Goals:  Short Term Goals: 8 weeks  1. Pt will be compliant with HEP 50% of prescribed amount.   2. The pt to demo improvement in Elbow " ROM to full compared to uninvolved side.   3.  Pt will tolerate being out of brace for 24 hours without pain  4. Pt will improve FOTO score to meet or exceed MCID.      Long Term Goals: 26 weeks   Pt will be compliant with % of prescribed amount.   Pt will demo  strength and isometric elbow strength within 90% LSI measured via HHD.   Pt will report ability to perform a full work day without pain or limitation.   Pt will improve FOTO score to meet or exceed predicted outcome.   The pt will report full participation in ADLs and IADLs without restrictions related to L elbow.     Plan     Outpatient Physical Therapy 2 times weekly for 26 weeks to include the following interventions: Electrical Stimulation IFC/TENS, Manual Therapy, Moist Heat/ Ice, Neuromuscular Re-ed, Patient Education, Self Care, Therapeutic Activities, Therapeutic Exercise, and Dry Needling .     Progress ROM 10 degrees per week per Dr. Samayoa's instructions    Oren Holcomb, PT , DPT, SCS

## 2023-02-07 ENCOUNTER — CLINICAL SUPPORT (OUTPATIENT)
Dept: REHABILITATION | Facility: HOSPITAL | Age: 43
End: 2023-02-07
Payer: COMMERCIAL

## 2023-02-07 DIAGNOSIS — R29.898 WEAKNESS OF LEFT UPPER EXTREMITY: ICD-10-CM

## 2023-02-07 DIAGNOSIS — M25.622 DECREASED RANGE OF MOTION OF LEFT ELBOW: Primary | ICD-10-CM

## 2023-02-07 DIAGNOSIS — R52 PAIN AGGRAVATED BY LIFTING: ICD-10-CM

## 2023-02-07 PROCEDURE — 97112 NEUROMUSCULAR REEDUCATION: CPT

## 2023-02-07 PROCEDURE — 97140 MANUAL THERAPY 1/> REGIONS: CPT

## 2023-02-07 PROCEDURE — 97110 THERAPEUTIC EXERCISES: CPT

## 2023-02-07 NOTE — PROGRESS NOTES
Physical Therapy Daily Treatment Note     Name: Felix Issa [facility]  Clinic Number: [id number]    Therapy Diagnosis:   Encounter Diagnoses   Name Primary?    Decreased range of motion of left elbow Yes    Weakness of left upper extremity     Pain aggravated by lifting      Physician: Marcin Viera III, *    Visit Date: 2/7/2023  Physician Orders: PT Eval and Treat  Medical Diagnosis from Referral:   S46.212A (ICD-10-CM) - Rupture of left distal biceps tendon, initial encounter   G89.18 (ICD-10-CM) - Post-operative pain      Evaluation Date: 1/17/2023  Authorization Period Expiration: 12/20/2023  Plan of Care Expiration: 06/01/2023  Visit # / Visits authorized: 1 / 40 (8 total)      Time In: 0908   Time Out: 1005  Total Billable Time: 57 minutes    Precautions: Standard       DOS: 12/8/2022     PROCEDURE PERFORMED:    1. Open revision repair of Left distal biceps tendon (CPT 16432). (complex, -22 modifier)  2. Open Lysis of adhesions to left elbow  3. Removal of hardware left elbow     POSTOPERATIVE CARE: Will keep patient in splint for approximately 14 days. Then brace locked at 90 x 14 days.  At that time, will take out and begin dynamic splinting.      Possible staged need for allograft recon discussed due to the shortened tendon with scar.     Keep at  90° x 4 weeks, then increase range of motion (extension) by 10° per week with only forearm rotation allowed with fully elbow flexed X4 weeks.    Subjective     Pt reports: He is continuing to tolerate progressive ROM without any issues.     He was compliant with home exercise program.  Response to previous treatment: Good tolerance to new ROM    Pain: Not verbalized /10  Location: L elbow     Objective     Daily Measurements:     L Elbow PROM: 0-      Daily Treatment       Felix received therapeutic exercises to develop strength, endurance, ROM, and flexibility for 30 minutes including:  Elbow Flexion ext AROM in brace 2x20  Elbow Pro/sup in brace at 90  "degrees flex 2x20  Lat stretch in brace 4x30"  Foam roll flexor/pronator mass x3 mins  Prone row in unlocked brace 2x20   Prone Extension in unlocked brace 2x20   S/L ER 3# cuff wt 2x15      Felix received the following manual therapy techniques: were applied for 9 minutes, including:  Brace adjustment  Elbow PROM  Lat Hold/relax   Pronator hold/relax  RH gapping       Felix participated in neuromuscular re-education activities to improve: Coordination, Kinesthetic, Sense, Proprioception, and Motor Control for 18 minutes. The following activities were included:  Prone Lvl 1 LT [id number]"  Prone Lvl 2 Mt [id number]"       Home Exercises and Patient Education Provided     Education provided:   - Reviwed precautions and timelines for progression    Written Home Exercises Provided: yes.  Exercises were reviewed and Felix was able to demonstrate them prior to the end of the session.  Felix demonstrated good  understanding of the education provided.     See EMR under patient instructions for exercises given.     Assessment     Pt able to progress extension to 40 degrees pain free. He was observed weight bearing and extending his arm during a transfer when out of the brace. This was corrected and he was educated on his precautions. Continued with parascapular re-education training.     Felix Is progressing well towards his goals.     Pt will continue to benefit from skilled outpatient physical therapy to address the deficits listed in the problem list box on initial evaluation, provide pt/family education and to maximize pt's level of independence in the home and community environment. Pt prognosis is Good.     Pt's spiritual, cultural and educational needs considered and pt agreeable to plan of care and goals.    Anticipated barriers to physical therapy: None    Goals:  Short Term Goals: 8 weeks  1. Pt will be compliant with HEP 50% of prescribed amount.   2. The pt to demo improvement in Elbow ROM to full compared to " uninvolved side.   3.  Pt will tolerate being out of brace for 24 hours without pain  4. Pt will improve FOTO score to meet or exceed MCID.      Long Term Goals: 26 weeks   Pt will be compliant with % of prescribed amount.   Pt will demo  strength and isometric elbow strength within 90% LSI measured via HHD.   Pt will report ability to perform a full work day without pain or limitation.   Pt will improve FOTO score to meet or exceed predicted outcome.   The pt will report full participation in ADLs and IADLs without restrictions related to L elbow.     Plan     Outpatient Physical Therapy 2 times weekly for 26 weeks to include the following interventions: Electrical Stimulation IFC/TENS, Manual Therapy, Moist Heat/ Ice, Neuromuscular Re-ed, Patient Education, Self Care, Therapeutic Activities, Therapeutic Exercise, and Dry Needling .     Progress ROM 10 degrees per week per Dr. Samayoa's instructions    Oren Holcomb, PT , DPT, SCS

## 2023-02-10 ENCOUNTER — OFFICE VISIT (OUTPATIENT)
Dept: PRIMARY CARE CLINIC | Facility: CLINIC | Age: 43
End: 2023-02-10
Payer: COMMERCIAL

## 2023-02-10 VITALS
HEART RATE: 87 BPM | SYSTOLIC BLOOD PRESSURE: 162 MMHG | DIASTOLIC BLOOD PRESSURE: 110 MMHG | BODY MASS INDEX: 28.03 KG/M2 | OXYGEN SATURATION: 97 % | WEIGHT: 174.38 LBS | HEIGHT: 66 IN

## 2023-02-10 DIAGNOSIS — I10 HTN (HYPERTENSION), BENIGN: Primary | ICD-10-CM

## 2023-02-10 DIAGNOSIS — G43.909 MIGRAINE WITHOUT STATUS MIGRAINOSUS, NOT INTRACTABLE, UNSPECIFIED MIGRAINE TYPE: ICD-10-CM

## 2023-02-10 PROCEDURE — 3080F PR MOST RECENT DIASTOLIC BLOOD PRESSURE >= 90 MM HG: ICD-10-PCS | Mod: CPTII,S$GLB,, | Performed by: STUDENT IN AN ORGANIZED HEALTH CARE EDUCATION/TRAINING PROGRAM

## 2023-02-10 PROCEDURE — 1160F PR REVIEW ALL MEDS BY PRESCRIBER/CLIN PHARMACIST DOCUMENTED: ICD-10-PCS | Mod: CPTII,S$GLB,, | Performed by: STUDENT IN AN ORGANIZED HEALTH CARE EDUCATION/TRAINING PROGRAM

## 2023-02-10 PROCEDURE — 99999 PR PBB SHADOW E&M-EST. PATIENT-LVL V: ICD-10-PCS | Mod: PBBFAC,,, | Performed by: STUDENT IN AN ORGANIZED HEALTH CARE EDUCATION/TRAINING PROGRAM

## 2023-02-10 PROCEDURE — 1159F MED LIST DOCD IN RCRD: CPT | Mod: CPTII,S$GLB,, | Performed by: STUDENT IN AN ORGANIZED HEALTH CARE EDUCATION/TRAINING PROGRAM

## 2023-02-10 PROCEDURE — 3008F BODY MASS INDEX DOCD: CPT | Mod: CPTII,S$GLB,, | Performed by: STUDENT IN AN ORGANIZED HEALTH CARE EDUCATION/TRAINING PROGRAM

## 2023-02-10 PROCEDURE — 4010F ACE/ARB THERAPY RXD/TAKEN: CPT | Mod: CPTII,S$GLB,, | Performed by: STUDENT IN AN ORGANIZED HEALTH CARE EDUCATION/TRAINING PROGRAM

## 2023-02-10 PROCEDURE — 4010F PR ACE/ARB THEARPY RXD/TAKEN: ICD-10-PCS | Mod: CPTII,S$GLB,, | Performed by: STUDENT IN AN ORGANIZED HEALTH CARE EDUCATION/TRAINING PROGRAM

## 2023-02-10 PROCEDURE — 3008F PR BODY MASS INDEX (BMI) DOCUMENTED: ICD-10-PCS | Mod: CPTII,S$GLB,, | Performed by: STUDENT IN AN ORGANIZED HEALTH CARE EDUCATION/TRAINING PROGRAM

## 2023-02-10 PROCEDURE — 1159F PR MEDICATION LIST DOCUMENTED IN MEDICAL RECORD: ICD-10-PCS | Mod: CPTII,S$GLB,, | Performed by: STUDENT IN AN ORGANIZED HEALTH CARE EDUCATION/TRAINING PROGRAM

## 2023-02-10 PROCEDURE — 3077F SYST BP >= 140 MM HG: CPT | Mod: CPTII,S$GLB,, | Performed by: STUDENT IN AN ORGANIZED HEALTH CARE EDUCATION/TRAINING PROGRAM

## 2023-02-10 PROCEDURE — 3080F DIAST BP >= 90 MM HG: CPT | Mod: CPTII,S$GLB,, | Performed by: STUDENT IN AN ORGANIZED HEALTH CARE EDUCATION/TRAINING PROGRAM

## 2023-02-10 PROCEDURE — 99214 PR OFFICE/OUTPT VISIT, EST, LEVL IV, 30-39 MIN: ICD-10-PCS | Mod: S$GLB,,, | Performed by: STUDENT IN AN ORGANIZED HEALTH CARE EDUCATION/TRAINING PROGRAM

## 2023-02-10 PROCEDURE — 99999 PR PBB SHADOW E&M-EST. PATIENT-LVL V: CPT | Mod: PBBFAC,,, | Performed by: STUDENT IN AN ORGANIZED HEALTH CARE EDUCATION/TRAINING PROGRAM

## 2023-02-10 PROCEDURE — 3077F PR MOST RECENT SYSTOLIC BLOOD PRESSURE >= 140 MM HG: ICD-10-PCS | Mod: CPTII,S$GLB,, | Performed by: STUDENT IN AN ORGANIZED HEALTH CARE EDUCATION/TRAINING PROGRAM

## 2023-02-10 PROCEDURE — 1160F RVW MEDS BY RX/DR IN RCRD: CPT | Mod: CPTII,S$GLB,, | Performed by: STUDENT IN AN ORGANIZED HEALTH CARE EDUCATION/TRAINING PROGRAM

## 2023-02-10 PROCEDURE — 99214 OFFICE O/P EST MOD 30 MIN: CPT | Mod: S$GLB,,, | Performed by: STUDENT IN AN ORGANIZED HEALTH CARE EDUCATION/TRAINING PROGRAM

## 2023-02-10 RX ORDER — SUMATRIPTAN SUCCINATE 25 MG/1
50 TABLET ORAL EVERY 6 HOURS PRN
Qty: 15 TABLET | Refills: 0 | Status: SHIPPED | OUTPATIENT
Start: 2023-02-10 | End: 2023-08-05 | Stop reason: SDUPTHER

## 2023-02-10 RX ORDER — LOSARTAN POTASSIUM 100 MG/1
100 TABLET ORAL DAILY
Qty: 90 TABLET | Refills: 3 | Status: SHIPPED | OUTPATIENT
Start: 2023-02-10 | End: 2024-02-02

## 2023-02-10 NOTE — PROGRESS NOTES
SUBJECTIVE     Chief Complaint   Patient presents with    Follow-up     Check vitamin D & calcium deficiency       HPI  Felix Stern is a 42 y.o. male with medical diagnoses as listed in the medical history and problem list that presents for annual exam. Pt has been doing well since our last appointment.    Pt {IS/IS NOT:82985} UTD on age appropriate CA screening.    Family, social, surgical Hx reviewed     Health Maintenance         Date Due Completion Date    Hepatitis C Screening Never done ---    Lipid Panel Never done ---    COVID-19 Vaccine (1) Never done ---    HIV Screening Never done ---    TETANUS VACCINE Never done ---    Sign Pain Contract Never done ---    Complete Opioid Risk Tool Never done ---    Hemoglobin A1c (Diabetic Prevention Screening) Never done ---    Influenza Vaccine (1) Never done ---                  PAST MEDICAL HISTORY:  Past Medical History:   Diagnosis Date    GERD (gastroesophageal reflux disease)     HTN (hypertension), benign 2/10/2023       PAST SURGICAL HISTORY:  Past Surgical History:   Procedure Laterality Date    HAND SURGERY      REPAIR,TENDON,DISTAL PROXIMAL Left 12/8/2022    Procedure: REPAIR,TENDON,DISTAL PROXIMAL;  Surgeon: Jossy Samayoa MD;  Location: [facility];  Service: Orthopedics;  Laterality: Left;  Distal bicep repair    VASECTOMY         SOCIAL HISTORY:  Social History     Socioeconomic History    Marital status:    Tobacco Use    Smoking status: Never    Smokeless tobacco: Never   Substance and Sexual Activity    Alcohol use: Yes     Comment: occasionally    Drug use: No       FAMILY HISTORY:  Family History   Problem Relation Age of Onset    Colon cancer Neg Hx     Esophageal cancer Neg Hx     Stomach cancer Neg Hx     Rectal cancer Neg Hx        ALLERGIES AND MEDICATIONS: updated and reviewed.  Review of patient's allergies indicates:  No Known Allergies  Current Outpatient Medications   Medication Sig Dispense Refill    acyclovir  (ZOVIRAX) 400 MG tablet       citalopram (CELEXA) 20 MG tablet Take 20 mg by mouth once daily.      esomeprazole magnesium (NEXIUM) 10 mg suspension Take 10 mg by mouth before breakfast.      famotidine (PEPCID) 10 MG tablet Take 10 mg by mouth 2 (two) times daily.      ibuprofen (ADVIL,MOTRIN) 600 MG tablet Take 600 mg by mouth 3 (three) times daily.      multivitamin (THERAGRAN) per tablet Take 1 tablet by mouth once daily.      traMADoL (ULTRAM) 50 mg tablet Take 1-2 tablets ( mg total) by mouth every 6 (six) hours as needed for Pain. 21 tablet 0    VENTOLIN HFA 90 mcg/actuation inhaler       aspirin (ECOTRIN) 81 MG EC tablet Take 1 tablet (81 mg total) by mouth once daily. For 4 weeks starting the day after surgery. (Patient not taking: Reported on 2/10/2023) 28 tablet 0    losartan (COZAAR) 100 MG tablet Take 1 tablet (100 mg total) by mouth once daily. 90 tablet 3    oxyCODONE-acetaminophen (PERCOCET)  mg per tablet Take 1 tablet by mouth every 4-6 hours as needed for pain. Take stool softener with this medication. (Patient not taking: Reported on 1/23/2023) 21 tablet 0    pantoprazole (PROTONIX) 40 MG tablet Take 40 mg by mouth once daily.      sulfamethoxazole-trimethoprim 800-160mg (BACTRIM DS) 800-160 mg Tab Take 1 tablet by mouth 2 (two) times daily. (Patient not taking: Reported on 2/10/2023) 14 tablet 0    sumatriptan (IMITREX) 25 MG Tab Take 2 tablets (50 mg total) by mouth every 6 (six) hours as needed (migraine). 15 tablet 0     No current facility-administered medications for this visit.       ROS  Review of Systems   Constitutional:  Negative for fever and weight loss.   Respiratory:  Negative for cough and shortness of breath.    Cardiovascular:  Negative for chest pain and palpitations.   Gastrointestinal:  Negative for abdominal pain, constipation, diarrhea, nausea and vomiting.   Genitourinary:  Negative for dysuria.   Musculoskeletal:  Negative for back pain and joint  "pain.   Skin:  Negative for rash.   Neurological:  Negative for dizziness, weakness and headaches.   Psychiatric/Behavioral:  Negative for depression. The patient is not nervous/anxious.        OBJECTIVE     Physical Exam  Vitals:    02/10/23 0938   BP: (!) 162/110   Pulse: 87    Body mass index is 28.15 kg/m².  Weight: 79.1 kg (174 lb 6.1 oz)   Height: 5' 6" (167.6 cm)     Physical Exam  HENT:      Head: Normocephalic and atraumatic.      Nose: Nose normal.      Mouth/Throat:      Mouth: Mucous membranes are moist.      Pharynx: Oropharynx is clear.   Eyes:      Extraocular Movements: Extraocular movements intact.      Conjunctiva/sclera: Conjunctivae normal.      Pupils: Pupils are equal, round, and reactive to light.   Cardiovascular:      Rate and Rhythm: Normal rate and regular rhythm.   Pulmonary:      Effort: Pulmonary effort is normal.      Breath sounds: Normal breath sounds.   Abdominal:      General: There is no distension.      Palpations: Abdomen is soft.      Tenderness: There is no abdominal tenderness.   Musculoskeletal:         General: No swelling. Normal range of motion.      Cervical back: Normal range of motion.      Right lower leg: No edema.      Left lower leg: No edema.   Skin:     General: Skin is warm.      Findings: No lesion or rash.   Neurological:      General: No focal deficit present.      Mental Status: He is alert and oriented to person, place, and time.      Motor: No weakness.   Psychiatric:         Mood and Affect: Mood normal.         Thought Content: Thought content normal.             ASSESSMENT     42 y.o. male with     1. HTN (hypertension), benign        PLAN:     1. HTN (hypertension), benign  -     losartan (COZAAR) 100 MG tablet; Take 1 tablet (100 mg total) by mouth once daily.  Dispense: 90 tablet; Refill: 3    Other orders  -     sumatriptan (IMITREX) 25 MG Tab; Take 2 tablets (50 mg total) by mouth every 6 (six) hours as needed (migraine).  Dispense: 15 tablet; " Refill: 0        Discussed age and gender appropriate screenings at this visit and encouraged a healthy diet low in simple carbohydrates, and increased physical activity.  Counseled on medically appropriate vaccines based on age and current health condition.  Screening test reviewed and discussed with patient.      RTC in 1 year     Vilma Muñoz MD  02/12/2023 9:53 AM

## 2023-02-12 PROBLEM — G43.909 MIGRAINE WITHOUT STATUS MIGRAINOSUS, NOT INTRACTABLE: Status: ACTIVE | Noted: 2023-02-12

## 2023-02-13 ENCOUNTER — PATIENT MESSAGE (OUTPATIENT)
Dept: REHABILITATION | Facility: HOSPITAL | Age: 43
End: 2023-02-13
Payer: COMMERCIAL

## 2023-02-13 ENCOUNTER — PATIENT MESSAGE (OUTPATIENT)
Dept: PRIMARY CARE CLINIC | Facility: CLINIC | Age: 43
End: 2023-02-13
Payer: COMMERCIAL

## 2023-02-13 DIAGNOSIS — R79.89 LOW TSH LEVEL: Primary | ICD-10-CM

## 2023-02-13 NOTE — TELEPHONE ENCOUNTER
Please let him know that headaches can definitely happen with quick drops in blood sugar.  It can also be associated with high blood pressure! After reviewing his labs, I need to repeat his thyroid labs.  I have placed these orders and he may have them done at any time.  He does not need to be fasting to have them done.

## 2023-02-13 NOTE — PROGRESS NOTES
Subjective:       Patient ID: Felix Stern is a 42 y.o. male.   Chief Complaint: Follow-up (Check vitamin D & calcium deficiency)      HTN -   Not currently on medication, has never been rx  Patient not currently checking BP at home.  +headaches  Denies  vision changes, CP, palpitations, or other concerning symptoms.   No results found for: MICALBCREAT  BP Readings from Last 3 Encounters:   02/10/23 (!) 162/110   01/30/23 (!) 151/109   01/18/23 (!) 160/110     Migraine headaches:  <10 headache days/month  Frontal headache  Nsaids/Fioricet prn for migraines  No aura  +nausea, no vomiting    Review of Systems   Constitutional:  Negative for fever.   Respiratory:  Negative for shortness of breath.    Cardiovascular:  Negative for chest pain.   Genitourinary:  Negative for dysuria, flank pain and urgency.   Musculoskeletal:  Negative for back pain, gait problem and leg pain.   Integumentary:  Negative for rash.   Neurological:  Positive for headaches. Negative for weakness and numbness.            Objective:        Physical Exam  HENT:      Head: Normocephalic and atraumatic.      Nose: Nose normal.      Mouth/Throat:      Mouth: Mucous membranes are moist.      Pharynx: Oropharynx is clear.   Eyes:      Extraocular Movements: Extraocular movements intact.      Conjunctiva/sclera: Conjunctivae normal.      Pupils: Pupils are equal, round, and reactive to light.   Cardiovascular:      Rate and Rhythm: Normal rate and regular rhythm.   Pulmonary:      Effort: Pulmonary effort is normal.   Musculoskeletal:         General: No swelling. Normal range of motion.      Cervical back: Normal range of motion.      Right lower leg: No edema.      Left lower leg: No edema.   Skin:     General: Skin is warm.      Findings: No lesion or rash.   Neurological:      General: No focal deficit present.      Mental Status: He is alert and oriented to person, place, and time.      Motor: No weakness.       Assessment:          Problem List Items Addressed This Visit          Neuro    Migraine without status migrainosus, not intractable     rx sumatriptan prn for migraines            Cardiac/Vascular    HTN (hypertension), benign - Primary    Relevant Medications    losartan (COZAAR) 100 MG tablet           Plan:         1. HTN (hypertension), benign  Overview:  Start Losartan 100mg   Keep bp log for 1 mo  Follow up in 1 month for bp check and med titration    Orders:  -     losartan (COZAAR) 100 MG tablet; Take 1 tablet (100 mg total) by mouth once daily.  Dispense: 90 tablet; Refill: 3    2. Migraine without status migrainosus, not intractable, unspecified migraine type  Overview:  Tylenol prn headaches    Assessment & Plan:  rx sumatriptan prn for migraines      Other orders  -     sumatriptan (IMITREX) 25 MG Tab; Take 2 tablets (50 mg total) by mouth every 6 (six) hours as needed (migraine).  Dispense: 15 tablet; Refill: 0          Follow up in 1 month    Vilma Muñoz MD

## 2023-02-15 DIAGNOSIS — I10 HTN (HYPERTENSION), BENIGN: ICD-10-CM

## 2023-02-17 ENCOUNTER — PATIENT MESSAGE (OUTPATIENT)
Dept: PRIMARY CARE CLINIC | Facility: CLINIC | Age: 43
End: 2023-02-17
Payer: COMMERCIAL

## 2023-02-17 ENCOUNTER — OFFICE VISIT (OUTPATIENT)
Dept: SPORTS MEDICINE | Facility: CLINIC | Age: 43
End: 2023-02-17
Payer: COMMERCIAL

## 2023-02-17 ENCOUNTER — CLINICAL SUPPORT (OUTPATIENT)
Dept: REHABILITATION | Facility: HOSPITAL | Age: 43
End: 2023-02-17
Payer: COMMERCIAL

## 2023-02-17 ENCOUNTER — LAB VISIT (OUTPATIENT)
Dept: LAB | Facility: HOSPITAL | Age: 43
End: 2023-02-17
Attending: STUDENT IN AN ORGANIZED HEALTH CARE EDUCATION/TRAINING PROGRAM
Payer: COMMERCIAL

## 2023-02-17 VITALS
HEIGHT: 66 IN | WEIGHT: 174 LBS | DIASTOLIC BLOOD PRESSURE: 103 MMHG | BODY MASS INDEX: 27.97 KG/M2 | HEART RATE: 90 BPM | SYSTOLIC BLOOD PRESSURE: 145 MMHG

## 2023-02-17 DIAGNOSIS — R29.898 WEAKNESS OF LEFT UPPER EXTREMITY: ICD-10-CM

## 2023-02-17 DIAGNOSIS — R52 PAIN AGGRAVATED BY LIFTING: ICD-10-CM

## 2023-02-17 DIAGNOSIS — S46.212D RUPTURE OF LEFT DISTAL BICEPS TENDON, SUBSEQUENT ENCOUNTER: Primary | ICD-10-CM

## 2023-02-17 DIAGNOSIS — M25.622 DECREASED RANGE OF MOTION OF LEFT ELBOW: Primary | ICD-10-CM

## 2023-02-17 DIAGNOSIS — I10 HTN (HYPERTENSION), BENIGN: ICD-10-CM

## 2023-02-17 DIAGNOSIS — R79.89 LOW TSH LEVEL: ICD-10-CM

## 2023-02-17 LAB
ALBUMIN SERPL BCP-MCNC: 4 G/DL (ref 3.5–5.2)
ALP SERPL-CCNC: 69 U/L (ref 55–135)
ALT SERPL W/O P-5'-P-CCNC: 33 U/L (ref 10–44)
ANION GAP SERPL CALC-SCNC: 8 MMOL/L (ref 8–16)
AST SERPL-CCNC: 23 U/L (ref 10–40)
BILIRUB SERPL-MCNC: 0.5 MG/DL (ref 0.1–1)
BUN SERPL-MCNC: 16 MG/DL (ref 6–20)
CALCIUM SERPL-MCNC: 9.5 MG/DL (ref 8.7–10.5)
CHLORIDE SERPL-SCNC: 103 MMOL/L (ref 95–110)
CO2 SERPL-SCNC: 29 MMOL/L (ref 23–29)
CREAT SERPL-MCNC: 1.3 MG/DL (ref 0.5–1.4)
EST. GFR  (NO RACE VARIABLE): >60 ML/MIN/1.73 M^2
GLUCOSE SERPL-MCNC: 101 MG/DL (ref 70–110)
POTASSIUM SERPL-SCNC: 4.5 MMOL/L (ref 3.5–5.1)
PROT SERPL-MCNC: 7.3 G/DL (ref 6–8.4)
SODIUM SERPL-SCNC: 140 MMOL/L (ref 136–145)
T4 FREE SERPL-MCNC: 0.68 NG/DL (ref 0.71–1.51)
TSH SERPL DL<=0.005 MIU/L-ACNC: 0.76 UIU/ML (ref 0.4–4)

## 2023-02-17 PROCEDURE — 3080F PR MOST RECENT DIASTOLIC BLOOD PRESSURE >= 90 MM HG: ICD-10-PCS | Mod: CPTII,S$GLB,, | Performed by: ORTHOPAEDIC SURGERY

## 2023-02-17 PROCEDURE — 97110 THERAPEUTIC EXERCISES: CPT

## 2023-02-17 PROCEDURE — 97112 NEUROMUSCULAR REEDUCATION: CPT

## 2023-02-17 PROCEDURE — 99024 PR POST-OP FOLLOW-UP VISIT: ICD-10-PCS | Mod: S$GLB,,, | Performed by: ORTHOPAEDIC SURGERY

## 2023-02-17 PROCEDURE — 4010F ACE/ARB THERAPY RXD/TAKEN: CPT | Mod: CPTII,S$GLB,, | Performed by: ORTHOPAEDIC SURGERY

## 2023-02-17 PROCEDURE — 84443 ASSAY THYROID STIM HORMONE: CPT | Performed by: STUDENT IN AN ORGANIZED HEALTH CARE EDUCATION/TRAINING PROGRAM

## 2023-02-17 PROCEDURE — 84439 ASSAY OF FREE THYROXINE: CPT | Performed by: STUDENT IN AN ORGANIZED HEALTH CARE EDUCATION/TRAINING PROGRAM

## 2023-02-17 PROCEDURE — 1159F PR MEDICATION LIST DOCUMENTED IN MEDICAL RECORD: ICD-10-PCS | Mod: CPTII,S$GLB,, | Performed by: ORTHOPAEDIC SURGERY

## 2023-02-17 PROCEDURE — 99999 PR PBB SHADOW E&M-EST. PATIENT-LVL III: ICD-10-PCS | Mod: PBBFAC,,, | Performed by: ORTHOPAEDIC SURGERY

## 2023-02-17 PROCEDURE — 3077F SYST BP >= 140 MM HG: CPT | Mod: CPTII,S$GLB,, | Performed by: ORTHOPAEDIC SURGERY

## 2023-02-17 PROCEDURE — 99024 POSTOP FOLLOW-UP VISIT: CPT | Mod: S$GLB,,, | Performed by: ORTHOPAEDIC SURGERY

## 2023-02-17 PROCEDURE — 3008F PR BODY MASS INDEX (BMI) DOCUMENTED: ICD-10-PCS | Mod: CPTII,S$GLB,, | Performed by: ORTHOPAEDIC SURGERY

## 2023-02-17 PROCEDURE — 99999 PR PBB SHADOW E&M-EST. PATIENT-LVL III: CPT | Mod: PBBFAC,,, | Performed by: ORTHOPAEDIC SURGERY

## 2023-02-17 PROCEDURE — 1159F MED LIST DOCD IN RCRD: CPT | Mod: CPTII,S$GLB,, | Performed by: ORTHOPAEDIC SURGERY

## 2023-02-17 PROCEDURE — 97140 MANUAL THERAPY 1/> REGIONS: CPT

## 2023-02-17 PROCEDURE — 3008F BODY MASS INDEX DOCD: CPT | Mod: CPTII,S$GLB,, | Performed by: ORTHOPAEDIC SURGERY

## 2023-02-17 PROCEDURE — 3077F PR MOST RECENT SYSTOLIC BLOOD PRESSURE >= 140 MM HG: ICD-10-PCS | Mod: CPTII,S$GLB,, | Performed by: ORTHOPAEDIC SURGERY

## 2023-02-17 PROCEDURE — 80053 COMPREHEN METABOLIC PANEL: CPT | Performed by: STUDENT IN AN ORGANIZED HEALTH CARE EDUCATION/TRAINING PROGRAM

## 2023-02-17 PROCEDURE — 4010F PR ACE/ARB THEARPY RXD/TAKEN: ICD-10-PCS | Mod: CPTII,S$GLB,, | Performed by: ORTHOPAEDIC SURGERY

## 2023-02-17 PROCEDURE — 36415 COLL VENOUS BLD VENIPUNCTURE: CPT | Performed by: STUDENT IN AN ORGANIZED HEALTH CARE EDUCATION/TRAINING PROGRAM

## 2023-02-17 PROCEDURE — 3080F DIAST BP >= 90 MM HG: CPT | Mod: CPTII,S$GLB,, | Performed by: ORTHOPAEDIC SURGERY

## 2023-02-17 NOTE — PROGRESS NOTES
Physical Therapy Daily Treatment Note     Name: Felix Issa [facility]  Clinic Number: [id number]    Therapy Diagnosis:   Encounter Diagnoses   Name Primary?    Decreased range of motion of left elbow Yes    Weakness of left upper extremity     Pain aggravated by lifting      Physician: Marcin Viera III, *    Visit Date: 2/17/2023  Physician Orders: PT Eval and Treat  Medical Diagnosis from Referral:   S46.212A (ICD-10-CM) - Rupture of left distal biceps tendon, initial encounter   G89.18 (ICD-10-CM) - Post-operative pain      Evaluation Date: 1/17/2023  Authorization Period Expiration: 12/20/2023  Plan of Care Expiration: 06/01/2023  Visit # / Visits authorized: 2 / 40 (9 total)      Time In: 0902  Time Out: 0957  Total Billable Time: 55 minutes    Precautions: Standard       DOS: 12/8/2022     PROCEDURE PERFORMED:    1. Open revision repair of Left distal biceps tendon (CPT 60239). (complex, -22 modifier)  2. Open Lysis of adhesions to left elbow  3. Removal of hardware left elbow     POSTOPERATIVE CARE: Will keep patient in splint for approximately 14 days. Then brace locked at 90 x 14 days.  At that time, will take out and begin dynamic splinting.      Possible staged need for allograft recon discussed due to the shortened tendon with scar.     Keep at  90° x 4 weeks, then increase range of motion (extension) by 10° per week with only forearm rotation allowed with fully elbow flexed X4 weeks.    Subjective     Pt reports: He saw Dr. Samayoa this morning who told him to progress his ROM to full and he only needed to wear his brace when working as reminder to be cautious with it. He notes mild tightness in his antecubital la and anterior forearm at EROM elbow extension.      He was compliant with home exercise program.  Response to previous treatment: Good tolerance to new ROM    Pain: Not verbalized /10  Location: L elbow     Objective     Daily Measurements:           Daily Treatment       Felix received  "therapeutic exercises to develop strength, endurance, ROM, and flexibility for 30 minutes including:  Elbow Flexion ext AROM in brace 2x20  Elbow Pro/sup in brace at 90 degrees flex 2x20  Lat stretch in brace 4x30"  Foam roll flexor/pronator mass x3 mins  S/L ER 3# cuff wt 2x15  Median N glides at wall 2x15       Felix received the following manual therapy techniques: were applied for 9 minutes, including:  Elbow PROM  Lat Hold/relax   Pronator hold/relax  RH gapping       Felix participated in neuromuscular re-education activities to improve: Coordination, Kinesthetic, Sense, Proprioception, and Motor Control for 16 minutes. The following activities were included:  Prone Lvl 3 LT 3x10x3"  Prone Lvl 3 Mt 3x10x3"       Home Exercises and Patient Education Provided     Education provided:   - Reviwed precautions and timelines for progression    Written Home Exercises Provided: yes.  Exercises were reviewed and Felix was able to demonstrate them prior to the end of the session.  Felix demonstrated good  understanding of the education provided.     See EMR under patient instructions for exercises given.     Assessment     Pt presents with full elbow extension and very mild restriction of accessory mobility with slightly limited end feel at full extension. Intro'd median nerve glides which were tolerated well. I will round with Dr. Samayoa to confirm timelines to begin elbow strengthening before next appointment.     Felix Is progressing well towards his goals.     Pt will continue to benefit from skilled outpatient physical therapy to address the deficits listed in the problem list box on initial evaluation, provide pt/family education and to maximize pt's level of independence in the home and community environment. Pt prognosis is Good.     Pt's spiritual, cultural and educational needs considered and pt agreeable to plan of care and goals.    Anticipated barriers to physical therapy: None    Goals:  Short Term Goals: 8 " weeks  1. Pt will be compliant with HEP 50% of prescribed amount.   2. The pt to demo improvement in Elbow ROM to full compared to uninvolved side.   3.  Pt will tolerate being out of brace for 24 hours without pain  4. Pt will improve FOTO score to meet or exceed MCID.      Long Term Goals: 26 weeks   Pt will be compliant with % of prescribed amount.   Pt will demo  strength and isometric elbow strength within 90% LSI measured via HHD.   Pt will report ability to perform a full work day without pain or limitation.   Pt will improve FOTO score to meet or exceed predicted outcome.   The pt will report full participation in ADLs and IADLs without restrictions related to L elbow.     Plan     Outpatient Physical Therapy 2 times weekly for 26 weeks to include the following interventions: Electrical Stimulation IFC/TENS, Manual Therapy, Moist Heat/ Ice, Neuromuscular Re-ed, Patient Education, Self Care, Therapeutic Activities, Therapeutic Exercise, and Dry Needling .     Progress ROM 10 degrees per week per Dr. Samayoa's instructions    Oren Holcomb, PT , DPT, SCS

## 2023-02-20 ENCOUNTER — PATIENT MESSAGE (OUTPATIENT)
Dept: ORTHOPEDICS | Facility: CLINIC | Age: 43
End: 2023-02-20
Payer: COMMERCIAL

## 2023-02-22 NOTE — PROGRESS NOTES
Chief Complaint:  left elbow pain    HISTORY OF PRESENT ILLNESS:   Pt is here today for post-operative followup of distal biceps repair.  he is doing well.  We have reviewed patient's findings and discussed plan of care and future treatment options.      Patient notes swelling over the posterior forearm incision site that has progressively increased over the past 2 weeks. Yesterday he describes minimal white/yellow pus draining from the proximal end of his incision that has since stopped     Patient has been attending physical therapy at the [facility] location, working with [facility]. His first visit was yesterday     He does have some numbness of his lateral forearm that does not go into his hand or fingers. This has improved signifcantly but is not yet resolved     DATE OF PROCEDURE: 11/01/2022  SURGEON: Jossy Samayoa M.D.   PROCEDURE PERFORMED:    1. Open repair of Left distal biceps tendon (CPT 84558).       Review of Systems   Constitution: Negative. Negative for chills, fever and night sweats.   HENT: Negative for congestion and headaches.    Eyes: Negative for blurred vision, left vision loss and right vision loss.   Cardiovascular: Negative for chest pain and syncope.   Respiratory: Negative for cough and shortness of breath.    Endocrine: Negative for polydipsia, polyphagia and polyuria.   Hematologic/Lymphatic: Negative for bleeding problem. Does not bruise/bleed easily.   Skin: Negative for dry skin, itching and rash.   Musculoskeletal: Negative for falls and muscle weakness.   Gastrointestinal: Negative for abdominal pain and bowel incontinence.   Genitourinary: Negative for bladder incontinence and nocturia.   Neurological: Negative for disturbances in coordination, loss of balance and seizures.   Psychiatric/Behavioral: Negative for depression. The patient does not have insomnia.    Allergic/Immunologic: Negative for hives and persistent infections.                                                                                  PHYSICAL EXAMINATION:     Incision sites healed well  No evidence of any erythema, infection or induration  elbow Range of motion: flexion: 140, Extension: lacking 30  No effusion  2+ Radial pulses    + improved swelling at the site of the posterior forearm incision site, small scab on proximal end                                                                               ASSESSMENT:                                                                                                                                               1. Status post above, doing well.                                                                                                                               PLAN:                                                                                                                                                     1. Continue PT, case discussed with physical therapist   2. Continue hinged elbow brace, unlock per therapy progression   3. I have discussed return to activity in detail. No lifting over 1 pound with surgical arm. No stressing upper arm with any force.   4. Patient will see us back in 4 weeks.                                      5. Patient was placed in a bio sleeve with felt pad underneath for compression, he was placed on Bactrim DS for 7 days  6. All questions were answered, surgical technique was reviewed and interpreted, and patient should contact us with any questions or concerns in the interim.                                                                                                   POSTOPERATIVE CARE: Will keep patient in splint for approximately 14 days. Then brace locked at 90 x 14 days.  At that time, will take out and begin dynamic splinting.      Possible staged need for allograft recon discussed due to the shortened tendon with scar.     Keep at  90° x 4 weeks, then increase range of motion (extension) by 10° per week with only forearm  rotation allowed with fully elbow flexed X4 weeks.

## 2023-02-24 ENCOUNTER — CLINICAL SUPPORT (OUTPATIENT)
Dept: REHABILITATION | Facility: HOSPITAL | Age: 43
End: 2023-02-24
Payer: COMMERCIAL

## 2023-02-24 DIAGNOSIS — M25.622 DECREASED RANGE OF MOTION OF LEFT ELBOW: Primary | ICD-10-CM

## 2023-02-24 DIAGNOSIS — R52 PAIN AGGRAVATED BY LIFTING: ICD-10-CM

## 2023-02-24 DIAGNOSIS — R29.898 WEAKNESS OF LEFT UPPER EXTREMITY: ICD-10-CM

## 2023-02-24 PROCEDURE — 97140 MANUAL THERAPY 1/> REGIONS: CPT

## 2023-02-24 PROCEDURE — 97110 THERAPEUTIC EXERCISES: CPT

## 2023-02-24 NOTE — PROGRESS NOTES
Physical Therapy Daily Treatment Note     Name: Felix Issa [facility]  Clinic Number: [id number]    Therapy Diagnosis:   Encounter Diagnoses   Name Primary?    Decreased range of motion of left elbow Yes    Weakness of left upper extremity     Pain aggravated by lifting      Physician: Marcin Viera III, *    Visit Date: 2/24/2023  Physician Orders: PT Eval and Treat  Medical Diagnosis from Referral:   S46.212A (ICD-10-CM) - Rupture of left distal biceps tendon, initial encounter   G89.18 (ICD-10-CM) - Post-operative pain      Evaluation Date: 1/17/2023  Authorization Period Expiration: 12/20/2023  Plan of Care Expiration: 06/01/2023  Visit # / Visits authorized: 3 / 40 (10 total)      Time In: 0915 (Pt arrived late)  Time Out: 1004  Total Billable Time: 41 minutes    Precautions: Standard       DOS: 12/8/2022     PROCEDURE PERFORMED:    1. Open revision repair of Left distal biceps tendon (CPT 58745). (complex, -22 modifier)  2. Open Lysis of adhesions to left elbow  3. Removal of hardware left elbow     POSTOPERATIVE CARE: Will keep patient in splint for approximately 14 days. Then brace locked at 90 x 14 days.  At that time, will take out and begin dynamic splinting.      Possible staged need for allograft recon discussed due to the shortened tendon with scar.     Keep at  90° x 4 weeks, then increase range of motion (extension) by 10° per week with only forearm rotation allowed with fully elbow flexed X4 weeks.    Subjective     Pt reports: His elbow feels good. He notes some mild numbness at his anterior elbow and forearm that seems to be improving.     He was compliant with home exercise program.  Response to previous treatment: Good tolerance to new ROM    Pain: Not verbalized /10  Location: L elbow     Objective     Daily Measurements:           Daily Treatment       Felix received therapeutic exercises to develop strength, endurance, ROM, and flexibility for 32 minutes including:  UBE completed for 3/3  "min forward & backward to increase ROM, endurance and decrease pain to improve tolerance to ADLs and age related activities.   OTB ER 3x12  Isometric Elbow Flx/Ext at 90 degrees 5x30" ea 4/10 RPE  Bicep curls 5# 3x15 ea.   Median N glides at wall 2x15       Felix received the following manual therapy techniques: were applied for 9 minutes, including:  Elbow PROM  Lat Hold/relax   Pronator hold/relax  RH gapping       Felix participated in neuromuscular re-education activities to improve: Coordination, Kinesthetic, Sense, Proprioception, and Motor Control for 00 minutes. The following activities were included:    Not performed:   Prone Lvl 3 LT 3x10x3"  Prone Lvl 3 Mt 3x10x3"       Home Exercises and Patient Education Provided     Education provided:   - Reviwed precautions and timelines for progression    Written Home Exercises Provided: yes.  Exercises were reviewed and Felix was able to demonstrate them prior to the end of the session.  Felix demonstrated good  understanding of the education provided.     See EMR under patient instructions for exercises given.     Assessment     Intro'd low level biceps loading today which was tolerated pain free. Emphasis on sub max isometric contractions with RPE of 4/10 and low weight high reps currently for curls. Pt verbalized agreement to not attempt progressing weight until next appointment.     Felix Is progressing well towards his goals.     Pt will continue to benefit from skilled outpatient physical therapy to address the deficits listed in the problem list box on initial evaluation, provide pt/family education and to maximize pt's level of independence in the home and community environment. Pt prognosis is Good.     Pt's spiritual, cultural and educational needs considered and pt agreeable to plan of care and goals.    Anticipated barriers to physical therapy: None    Goals:  Short Term Goals: 8 weeks  1. Pt will be compliant with HEP 50% of prescribed amount.   2. The " pt to demo improvement in Elbow ROM to full compared to uninvolved side.   3.  Pt will tolerate being out of brace for 24 hours without pain  4. Pt will improve FOTO score to meet or exceed MCID.      Long Term Goals: 26 weeks   Pt will be compliant with % of prescribed amount.   Pt will demo  strength and isometric elbow strength within 90% LSI measured via HHD.   Pt will report ability to perform a full work day without pain or limitation.   Pt will improve FOTO score to meet or exceed predicted outcome.   The pt will report full participation in ADLs and IADLs without restrictions related to L elbow.     Plan     Outpatient Physical Therapy 2 times weekly for 26 weeks to include the following interventions: Electrical Stimulation IFC/TENS, Manual Therapy, Moist Heat/ Ice, Neuromuscular Re-ed, Patient Education, Self Care, Therapeutic Activities, Therapeutic Exercise, and Dry Needling .     Progress ROM 10 degrees per week per Dr. Samayoa's instructions    Oren Holcomb, PT , DPT, SCS

## 2023-02-27 ENCOUNTER — OFFICE VISIT (OUTPATIENT)
Dept: ORTHOPEDICS | Facility: CLINIC | Age: 43
End: 2023-02-27
Payer: COMMERCIAL

## 2023-02-27 ENCOUNTER — HOSPITAL ENCOUNTER (OUTPATIENT)
Dept: RADIOLOGY | Facility: HOSPITAL | Age: 43
Discharge: HOME | End: 2023-02-27
Attending: ORTHOPAEDIC SURGERY
Payer: COMMERCIAL

## 2023-02-27 VITALS — BODY MASS INDEX: 27.95 KG/M2 | WEIGHT: 173.94 LBS | HEIGHT: 66 IN

## 2023-02-27 DIAGNOSIS — M84.375A STRESS FRACTURE OF METATARSAL BONE OF LEFT FOOT, INITIAL ENCOUNTER: ICD-10-CM

## 2023-02-27 DIAGNOSIS — M84.375D STRESS FRACTURE OF METATARSAL BONE OF LEFT FOOT WITH ROUTINE HEALING, SUBSEQUENT ENCOUNTER: Primary | ICD-10-CM

## 2023-02-27 PROCEDURE — 99212 PR OFFICE/OUTPT VISIT, EST, LEVL II, 10-19 MIN: ICD-10-PCS | Mod: S$GLB,,, | Performed by: ORTHOPAEDIC SURGERY

## 2023-02-27 PROCEDURE — 4010F ACE/ARB THERAPY RXD/TAKEN: CPT | Mod: CPTII,S$GLB,, | Performed by: ORTHOPAEDIC SURGERY

## 2023-02-27 PROCEDURE — 3008F PR BODY MASS INDEX (BMI) DOCUMENTED: ICD-10-PCS | Mod: CPTII,S$GLB,, | Performed by: ORTHOPAEDIC SURGERY

## 2023-02-27 PROCEDURE — 4010F PR ACE/ARB THEARPY RXD/TAKEN: ICD-10-PCS | Mod: CPTII,S$GLB,, | Performed by: ORTHOPAEDIC SURGERY

## 2023-02-27 PROCEDURE — 1159F PR MEDICATION LIST DOCUMENTED IN MEDICAL RECORD: ICD-10-PCS | Mod: CPTII,S$GLB,, | Performed by: ORTHOPAEDIC SURGERY

## 2023-02-27 PROCEDURE — 73630 X-RAY EXAM OF FOOT: CPT | Mod: 26,LT,, | Performed by: RADIOLOGY

## 2023-02-27 PROCEDURE — 99999 PR PBB SHADOW E&M-EST. PATIENT-LVL III: CPT | Mod: PBBFAC,,, | Performed by: ORTHOPAEDIC SURGERY

## 2023-02-27 PROCEDURE — 1159F MED LIST DOCD IN RCRD: CPT | Mod: CPTII,S$GLB,, | Performed by: ORTHOPAEDIC SURGERY

## 2023-02-27 PROCEDURE — 99999 PR PBB SHADOW E&M-EST. PATIENT-LVL III: ICD-10-PCS | Mod: PBBFAC,,, | Performed by: ORTHOPAEDIC SURGERY

## 2023-02-27 PROCEDURE — 99212 OFFICE O/P EST SF 10 MIN: CPT | Mod: S$GLB,,, | Performed by: ORTHOPAEDIC SURGERY

## 2023-02-27 PROCEDURE — 73630 X-RAY EXAM OF FOOT: CPT | Mod: TC,LT

## 2023-02-27 PROCEDURE — 73630 XR FOOT COMPLETE 3 VIEW LEFT: ICD-10-PCS | Mod: 26,LT,, | Performed by: RADIOLOGY

## 2023-02-27 PROCEDURE — 3008F BODY MASS INDEX DOCD: CPT | Mod: CPTII,S$GLB,, | Performed by: ORTHOPAEDIC SURGERY

## 2023-02-27 NOTE — PROGRESS NOTES
Felix Stern  Checked in 20 minutes late for appointment     This is a 42-year-old male with a history of hallux rigidus of his right big toe who I last saw on 01/23/2023 and he had been having about a two week history of pain in the middle of his foot.  An x-ray of the left foot revealed a complete fracture of the 2nd metatarsal distal shaft with slight angulation.  I recommended close treatment and he is now about eight weeks post onset of symptoms.  He returns today and reports that his symptoms are improved.    Examination:  The left foot reveals no significant swelling.  He has some prominence around the 2nd metatarsal shaft with some mild tenderness.    Imaging:  I ordered reviewed an x-ray of the left foot today.  X-ray reveals abundant new bone formation around the 2nd metatarsal stress fracture site.    Impression:  1. Stress fracture of 2nd metatarsal bone of left foot with routine healing, subsequent encounter   X-Ray Foot Complete Left            Recommendation:  He is healing as expected.  I would allow him to wean out of the boot at this point.  He should continue to protect his weight-bearing to avoid any pain.    Follow-up in six weeks if necessary

## 2023-02-28 ENCOUNTER — CLINICAL SUPPORT (OUTPATIENT)
Dept: REHABILITATION | Facility: HOSPITAL | Age: 43
End: 2023-02-28
Payer: COMMERCIAL

## 2023-02-28 DIAGNOSIS — R52 PAIN AGGRAVATED BY LIFTING: ICD-10-CM

## 2023-02-28 DIAGNOSIS — R29.898 WEAKNESS OF LEFT UPPER EXTREMITY: ICD-10-CM

## 2023-02-28 DIAGNOSIS — M25.622 DECREASED RANGE OF MOTION OF LEFT ELBOW: Primary | ICD-10-CM

## 2023-02-28 PROCEDURE — 97112 NEUROMUSCULAR REEDUCATION: CPT

## 2023-02-28 PROCEDURE — 97530 THERAPEUTIC ACTIVITIES: CPT

## 2023-02-28 PROCEDURE — 97110 THERAPEUTIC EXERCISES: CPT

## 2023-02-28 NOTE — PROGRESS NOTES
"  Physical Therapy Daily Treatment Note     Name: Felix Issa [facility]  Clinic Number: [id number]    Therapy Diagnosis:   Encounter Diagnoses   Name Primary?    Decreased range of motion of left elbow Yes    Weakness of left upper extremity     Pain aggravated by lifting      Physician: Marcin Viera III, *    Visit Date: 2/28/2023  Physician Orders: PT Eval and Treat  Medical Diagnosis from Referral:   S46.212A (ICD-10-CM) - Rupture of left distal biceps tendon, initial encounter   G89.18 (ICD-10-CM) - Post-operative pain      Evaluation Date: 1/17/2023  Authorization Period Expiration: 12/20/2023  Plan of Care Expiration: 06/01/2023  Visit # / Visits authorized: 4 / 40 (11 total)      Time In: 1000  Time Out: 1058  Total Billable Time: 58 minutes    Precautions: Standard       DOS: 12/8/2022     PROCEDURE PERFORMED:    1. Open revision repair of Left distal biceps tendon (CPT 50741). (complex, -22 modifier)  2. Open Lysis of adhesions to left elbow  3. Removal of hardware left elbow     POSTOPERATIVE CARE: Will keep patient in splint for approximately 14 days. Then brace locked at 90 x 14 days.  At that time, will take out and begin dynamic splinting.      Possible staged need for allograft recon discussed due to the shortened tendon with scar.     Keep at  90° x 4 weeks, then increase range of motion (extension) by 10° per week with only forearm rotation allowed with fully elbow flexed X4 weeks.    Subjective     Pt reports: "It feels better every day".    He was compliant with home exercise program.  Response to previous treatment: Good tolerance to new ROM    Pain: Not verbalized /10  Location: L elbow     Objective     Daily Measurements:           Daily Treatment       Felix received therapeutic exercises to develop strength, endurance, ROM, and flexibility for 27 minutes including:  UBE completed for 4/4 min forward & backward to increase ROM, endurance and decrease pain to improve tolerance to ADLs and " "age related activities.   Bicep curls 7# 3x15 ea.   Cable triceps extension 13# 3x12   Median N glides at wall 2x15     Not performed:   OTB ER 3x12      Felix received the following manual therapy techniques: were applied for 00 minutes, including:      Felix participated in neuromuscular re-education activities to improve: Coordination, Kinesthetic, Sense, Proprioception, and Motor Control for 19 minutes. The following activities were included:  Supine Dynamic Lat mobility drill 2x15   Prone Lvl 3 LT 3x12x3"  Prone Lvl 3 Mt 3x12x3"     Felix participated in dynamic functional therapeutic activities to improve functional performance for 12  minutes, including:  Bent over rows 7# 3x20  Walking Farmer carry 10# <-> 60 ft 5x        Home Exercises and Patient Education Provided     Education provided:   - Reviwed precautions and timelines for progression    Written Home Exercises Provided: yes.  Exercises were reviewed and Felix was able to demonstrate them prior to the end of the session.  Felix demonstrated good  understanding of the education provided.     See EMR under patient instructions for exercises given.     Assessment     Felix is tolerating gentle progressive loading for elbow musculature very well. He reported fatigue with bicep curls at weight listed above due to decreased muscular endurance bot no pain. Farmer carry and row initiated for job specific training due to demands of his occupation.     Felix Is progressing well towards his goals.     Pt will continue to benefit from skilled outpatient physical therapy to address the deficits listed in the problem list box on initial evaluation, provide pt/family education and to maximize pt's level of independence in the home and community environment. Pt prognosis is Good.     Pt's spiritual, cultural and educational needs considered and pt agreeable to plan of care and goals.    Anticipated barriers to physical therapy: None    Goals:  Short Term Goals: 8 " weeks  1. Pt will be compliant with HEP 50% of prescribed amount.   2. The pt to demo improvement in Elbow ROM to full compared to uninvolved side.   3.  Pt will tolerate being out of brace for 24 hours without pain  4. Pt will improve FOTO score to meet or exceed MCID.      Long Term Goals: 26 weeks   Pt will be compliant with % of prescribed amount.   Pt will demo  strength and isometric elbow strength within 90% LSI measured via HHD.   Pt will report ability to perform a full work day without pain or limitation.   Pt will improve FOTO score to meet or exceed predicted outcome.   The pt will report full participation in ADLs and IADLs without restrictions related to L elbow.     Plan     Outpatient Physical Therapy 2 times weekly for 26 weeks to include the following interventions: Electrical Stimulation IFC/TENS, Manual Therapy, Moist Heat/ Ice, Neuromuscular Re-ed, Patient Education, Self Care, Therapeutic Activities, Therapeutic Exercise, and Dry Needling .     Oren Holcomb, PT , DPT, SCS

## 2023-03-01 ENCOUNTER — OFFICE VISIT (OUTPATIENT)
Dept: ORTHOPEDICS | Facility: CLINIC | Age: 43
End: 2023-03-01
Payer: COMMERCIAL

## 2023-03-01 ENCOUNTER — HOSPITAL ENCOUNTER (OUTPATIENT)
Dept: RADIOLOGY | Facility: HOSPITAL | Age: 43
Discharge: HOME | End: 2023-03-01
Attending: NURSE PRACTITIONER
Payer: COMMERCIAL

## 2023-03-01 VITALS — BODY MASS INDEX: 27.95 KG/M2 | WEIGHT: 173.94 LBS | HEIGHT: 66 IN

## 2023-03-01 DIAGNOSIS — M25.562 ACUTE PAIN OF BOTH KNEES: Primary | ICD-10-CM

## 2023-03-01 DIAGNOSIS — M25.562 LEFT KNEE PAIN, UNSPECIFIED CHRONICITY: Primary | ICD-10-CM

## 2023-03-01 DIAGNOSIS — M25.562 ACUTE PAIN OF BOTH KNEES: ICD-10-CM

## 2023-03-01 DIAGNOSIS — M25.561 ACUTE PAIN OF BOTH KNEES: ICD-10-CM

## 2023-03-01 DIAGNOSIS — M25.561 ACUTE PAIN OF BOTH KNEES: Primary | ICD-10-CM

## 2023-03-01 PROCEDURE — 73564 X-RAY EXAM KNEE 4 OR MORE: CPT | Mod: 26,,, | Performed by: RADIOLOGY

## 2023-03-01 PROCEDURE — 73564 XR KNEE ORTHO BILAT WITH FLEXION: ICD-10-PCS | Mod: 26,,, | Performed by: RADIOLOGY

## 2023-03-01 PROCEDURE — 73564 X-RAY EXAM KNEE 4 OR MORE: CPT | Mod: TC,50

## 2023-03-01 PROCEDURE — 1160F PR REVIEW ALL MEDS BY PRESCRIBER/CLIN PHARMACIST DOCUMENTED: ICD-10-PCS | Mod: CPTII,S$GLB,, | Performed by: NURSE PRACTITIONER

## 2023-03-01 PROCEDURE — 99999 PR PBB SHADOW E&M-EST. PATIENT-LVL III: CPT | Mod: PBBFAC,,, | Performed by: NURSE PRACTITIONER

## 2023-03-01 PROCEDURE — 99213 OFFICE O/P EST LOW 20 MIN: CPT | Mod: S$GLB,,, | Performed by: NURSE PRACTITIONER

## 2023-03-01 PROCEDURE — 99999 PR PBB SHADOW E&M-EST. PATIENT-LVL III: ICD-10-PCS | Mod: PBBFAC,,, | Performed by: NURSE PRACTITIONER

## 2023-03-01 PROCEDURE — 99213 PR OFFICE/OUTPT VISIT, EST, LEVL III, 20-29 MIN: ICD-10-PCS | Mod: S$GLB,,, | Performed by: NURSE PRACTITIONER

## 2023-03-01 PROCEDURE — 3008F BODY MASS INDEX DOCD: CPT | Mod: CPTII,S$GLB,, | Performed by: NURSE PRACTITIONER

## 2023-03-01 PROCEDURE — 4010F ACE/ARB THERAPY RXD/TAKEN: CPT | Mod: CPTII,S$GLB,, | Performed by: NURSE PRACTITIONER

## 2023-03-01 PROCEDURE — 1159F PR MEDICATION LIST DOCUMENTED IN MEDICAL RECORD: ICD-10-PCS | Mod: CPTII,S$GLB,, | Performed by: NURSE PRACTITIONER

## 2023-03-01 PROCEDURE — 1159F MED LIST DOCD IN RCRD: CPT | Mod: CPTII,S$GLB,, | Performed by: NURSE PRACTITIONER

## 2023-03-01 PROCEDURE — 3008F PR BODY MASS INDEX (BMI) DOCUMENTED: ICD-10-PCS | Mod: CPTII,S$GLB,, | Performed by: NURSE PRACTITIONER

## 2023-03-01 PROCEDURE — 1160F RVW MEDS BY RX/DR IN RCRD: CPT | Mod: CPTII,S$GLB,, | Performed by: NURSE PRACTITIONER

## 2023-03-01 PROCEDURE — 4010F PR ACE/ARB THEARPY RXD/TAKEN: ICD-10-PCS | Mod: CPTII,S$GLB,, | Performed by: NURSE PRACTITIONER

## 2023-03-01 RX ORDER — MELOXICAM 15 MG/1
15 TABLET ORAL DAILY
Qty: 30 TABLET | Refills: 2 | Status: SHIPPED | OUTPATIENT
Start: 2023-03-01 | End: 2024-01-04

## 2023-03-01 NOTE — PROGRESS NOTES
SUBJECTIVE:     Chief Complaint & History of Present Illness:  Felix Stern is a New 42 y.o. year old male patient here with a history of intermittent bilateral knee pain which started a few weeks ago.  There is not a history of trauma.  The pain is located in the lateral aspect of the knee.  The pain is described as shooting, 5-6/10.  There is not radiation.  There is not catching or locking.  Aggravating factors include kneeling.  Associated symptoms include none.  There is not numbness or tingling of the lower extremity.  There is not back pain. Previous treatments include nothing which have provided minimal relief.  There is not a history of previous injury or surgery to the knee.  The patient does not use an assistive device.    In summary patient complains of numbing sensation to the lateral aspect of his knees worse on the left only when kneeling.  He states he is trying to remain active as he has an 8-year-old at home that he is trying to stay healthy for.  Was previously treated for stress fractures in his foot by Dr. Olivas and had a biceps tendon repair by Dr. Samayoa last year.    Review of patient's allergies indicates:  No Known Allergies      Current Outpatient Medications   Medication Sig Dispense Refill    acyclovir (ZOVIRAX) 400 MG tablet       aspirin (ECOTRIN) 81 MG EC tablet Take 1 tablet (81 mg total) by mouth once daily. For 4 weeks starting the day after surgery. 28 tablet 0    citalopram (CELEXA) 20 MG tablet Take 20 mg by mouth once daily.      esomeprazole magnesium (NEXIUM) 10 mg suspension Take 10 mg by mouth before breakfast.      famotidine (PEPCID) 10 MG tablet Take 10 mg by mouth 2 (two) times daily.      ibuprofen (ADVIL,MOTRIN) 600 MG tablet Take 600 mg by mouth 3 (three) times daily.      losartan (COZAAR) 100 MG tablet Take 1 tablet (100 mg total) by mouth once daily. 90 tablet 3    multivitamin (THERAGRAN) per tablet Take 1 tablet by mouth once daily.       oxyCODONE-acetaminophen (PERCOCET)  mg per tablet Take 1 tablet by mouth every 4-6 hours as needed for pain. Take stool softener with this medication. 21 tablet 0    pantoprazole (PROTONIX) 40 MG tablet Take 40 mg by mouth once daily.      sulfamethoxazole-trimethoprim 800-160mg (BACTRIM DS) 800-160 mg Tab Take 1 tablet by mouth 2 (two) times daily. 14 tablet 0    sumatriptan (IMITREX) 25 MG Tab Take 2 tablets (50 mg total) by mouth every 6 (six) hours as needed (migraine). 15 tablet 0    traMADoL (ULTRAM) 50 mg tablet Take 1-2 tablets ( mg total) by mouth every 6 (six) hours as needed for Pain. 21 tablet 0    VENTOLIN HFA 90 mcg/actuation inhaler        No current facility-administered medications for this visit.       Past Medical History:   Diagnosis Date    GERD (gastroesophageal reflux disease)     HTN (hypertension), benign 2/10/2023       Past Surgical History:   Procedure Laterality Date    HAND SURGERY      REPAIR,TENDON,DISTAL PROXIMAL Left 12/8/2022    Procedure: REPAIR,TENDON,DISTAL PROXIMAL;  Surgeon: Jossy Samayoa MD;  Location: [facility];  Service: Orthopedics;  Laterality: Left;  Distal bicep repair    VASECTOMY         Family History   Problem Relation Age of Onset    Colon cancer Neg Hx     Esophageal cancer Neg Hx     Stomach cancer Neg Hx     Rectal cancer Neg Hx      Review of Systems:  ROS:  Constitutional: no fever or chills  Eyes: no visual changes  ENT: no nasal congestion or sore throat  Respiratory: no cough or shortness of breath  Cardiovascular: no chest pain or palpitations  Gastrointestinal: no nausea or vomiting, tolerating diet  Genitourinary: no hematuria or dysuria  Integument/Breast: no rash or pruritis  Hematologic/Lymphatic: no easy bruising or lymphadenopathy  Musculoskeletal:  Bilateral knee pain  Neurological: no seizures or tremors  Behavioral/Psych: no auditory or visual hallucinations  Endocrine: no heat or cold intolerance      OBJECTIVE:     PHYSICAL EXAM:  Vital  "Signs (Most Recent)  There were no vitals filed for this visit.  Height: 5' 6" (167.6 cm) Weight: 78.9 kg (173 lb 15.1 oz),   Estimated body mass index is 28.08 kg/m² as calculated from the following:    Height as of this encounter: 5' 6" (1.676 m).    Weight as of this encounter: 78.9 kg (173 lb 15.1 oz).   General Appearance: Well nourished, well developed, in no acute distress.  HENT: Normal cephalic, oropharynx pink and moist  Eyes: PERRLA bilaterally and EOM x 4  Respiratory: Even and unlabored  Skin: Warm and Dry.   Psychiatric: AAO x 4, Mood & affect are normal.    bilateral  Knee Exam:  Knee Range of Motion:normal   Effusion:none  Condition of skin:intact  Location of tenderness:Lateral joint line   Strength:normal  Stability:  stable to testing, Lachman: stable, LCL: stable, MCL: stable, and PCL: stable  Varus /Valgus stress:  normal  Alba:   negative    Hip Examination:  normal    RADIOGRAPHS:  X-ray of his bilateral knees were obtained.  No acute fracture seen.  Mild medial tibial femoral joint space narrowing noted.  All radiographs were personally reviewed by me.    ASSESSMENT/PLAN:       ICD-10-CM ICD-9-CM   1. Left knee pain, unspecified chronicity  M25.562 719.46     -Felix Stern presents to clinic today with c/c bilateral knee pain for the past several weeks, only feels when kneeling.  Describes the pain as a "burning" pain.  -X-ray as above.  -Recommend RICE therapy.  -I will treat him with Mobic 15 mg PO daily.  Advised if this fails to improve, will consider steroid injection and/or formal PT or both.  I want to avoid steroids given recent stress fractures in his foot.  -Follow up in 6 weeks, ok to cancel appt if pain improves.    Future Appointments   Date Time Provider Department Center   3/7/2023  9:00 AM Oren Holcomb PT [facility] OP RHB1 [place]   3/10/2023  3:30 PM Vilma Muñoz MD [facility] PRICRE [place]   3/29/2023  9:00 AM Jossy Samayoa MD [facility] SPORTS [place]   4/11/2023 " 10:00 AM Ran Olivas MD [facility] ORTHO [place]   4/17/2023  3:30 PM Joshua Anderson NP [facility] ORTHO [place]

## 2023-03-07 ENCOUNTER — PATIENT MESSAGE (OUTPATIENT)
Dept: REHABILITATION | Facility: HOSPITAL | Age: 43
End: 2023-03-07

## 2023-03-07 ENCOUNTER — CLINICAL SUPPORT (OUTPATIENT)
Dept: REHABILITATION | Facility: HOSPITAL | Age: 43
End: 2023-03-07
Payer: COMMERCIAL

## 2023-03-07 DIAGNOSIS — M25.622 DECREASED RANGE OF MOTION OF LEFT ELBOW: Primary | ICD-10-CM

## 2023-03-07 DIAGNOSIS — R29.898 WEAKNESS OF LEFT UPPER EXTREMITY: ICD-10-CM

## 2023-03-07 DIAGNOSIS — R52 PAIN AGGRAVATED BY LIFTING: ICD-10-CM

## 2023-03-07 PROCEDURE — 97110 THERAPEUTIC EXERCISES: CPT

## 2023-03-07 PROCEDURE — 97530 THERAPEUTIC ACTIVITIES: CPT

## 2023-03-07 NOTE — PROGRESS NOTES
Physical Therapy Daily Treatment Note     Name: Felix Issa [facility]  Clinic Number: [id number]    Therapy Diagnosis:   Encounter Diagnoses   Name Primary?    Decreased range of motion of left elbow Yes    Weakness of left upper extremity     Pain aggravated by lifting      Physician: Marcin Viera III, *    Visit Date: 3/7/2023  Physician Orders: PT Eval and Treat  Medical Diagnosis from Referral:   S46.212A (ICD-10-CM) - Rupture of left distal biceps tendon, initial encounter   G89.18 (ICD-10-CM) - Post-operative pain      Evaluation Date: 1/17/2023  Authorization Period Expiration: 12/20/2023  Plan of Care Expiration: 06/01/2023  Visit # / Visits authorized: 5 / 40 (12 total)      Time In: 0910  Time Out: 1000  Total Billable Time: 47 minutes    Precautions: Standard       DOS: 12/8/2022     PROCEDURE PERFORMED:    1. Open revision repair of Left distal biceps tendon (CPT 96224). (complex, -22 modifier)  2. Open Lysis of adhesions to left elbow  3. Removal of hardware left elbow     POSTOPERATIVE CARE: Will keep patient in splint for approximately 14 days. Then brace locked at 90 x 14 days.  At that time, will take out and begin dynamic splinting.      Possible staged need for allograft recon discussed due to the shortened tendon with scar.     Keep at  90° x 4 weeks, then increase range of motion (extension) by 10° per week with only forearm rotation allowed with fully elbow flexed X4 weeks.    Subjective     Pt reports: No c/o pain or notable soreness following intro of progressive biceps loading.     He was compliant with home exercise program.  Response to previous treatment: Good tolerance to new ROM    Pain: Not verbalized /10  Location: L elbow     Objective     Daily Measurements:           Daily Treatment       Felix received therapeutic exercises to develop strength, endurance, ROM, and flexibility for 32 minutes including:  UBE completed for 4/4 min forward & backward to increase ROM, endurance  "and decrease pain to improve tolerance to ADLs and age related activities.   Bicep curls 10# 3x15 ea. W/fat    Cable triceps extension 13# 3x15  Median N glides at wall 2x15     Not performed:   OTB ER 3x12      Felix received the following manual therapy techniques: were applied for 00 minutes, including:      Felix participated in neuromuscular re-education activities to improve: Coordination, Kinesthetic, Sense, Proprioception, and Motor Control for 00 minutes. The following activities were included:    Not performed:  Supine Dynamic Lat mobility drill 2x15   Prone Lvl 3 LT 3x12x3"  Prone Lvl 3 Mt 3x12x3"       Felix participated in dynamic functional therapeutic activities to improve functional performance for 15  minutes, including:  Bent over rows 10# 3x20  Walking Farmer carry 15# w/fat  <-> 60 ft 5x        Home Exercises and Patient Education Provided     Education provided:   - Reviwed precautions and timelines for progression    Written Home Exercises Provided: yes.  Exercises were reviewed and Felix was able to demonstrate them prior to the end of the session.  Felix demonstrated good  understanding of the education provided.     See EMR under patient instructions for exercises given.     Assessment     Felix reported decreased fatigue of musculature in spite of weight progressions today. Good control noted through full range with biceps and triceps loading. Utilize fat  to facilitate increased loading of elbow/wrist musculature which was well tolerated.     Felix Is progressing well towards his goals.     Pt will continue to benefit from skilled outpatient physical therapy to address the deficits listed in the problem list box on initial evaluation, provide pt/family education and to maximize pt's level of independence in the home and community environment. Pt prognosis is Good.     Pt's spiritual, cultural and educational needs considered and pt agreeable to plan of care and " goals.    Anticipated barriers to physical therapy: None    Goals:  Short Term Goals: 8 weeks  1. Pt will be compliant with HEP 50% of prescribed amount. (Met)  2. The pt to demo improvement in Elbow ROM to full compared to uninvolved side. (Met)  3.  Pt will tolerate being out of brace for 24 hours without pain (Met)  4. Pt will improve FOTO score to meet or exceed MCID. (Met)     Long Term Goals: 26 weeks   Pt will be compliant with % of prescribed amount.   Pt will demo  strength and isometric elbow strength within 90% LSI measured via HHD.   Pt will report ability to perform a full work day without pain or limitation.   Pt will improve FOTO score to meet or exceed predicted outcome.   The pt will report full participation in ADLs and IADLs without restrictions related to L elbow.     Plan     Outpatient Physical Therapy 2 times weekly for 26 weeks to include the following interventions: Electrical Stimulation IFC/TENS, Manual Therapy, Moist Heat/ Ice, Neuromuscular Re-ed, Patient Education, Self Care, Therapeutic Activities, Therapeutic Exercise, and Dry Needling .     Oren Holcomb, PT , DPT, SCS

## 2023-03-15 ENCOUNTER — PATIENT MESSAGE (OUTPATIENT)
Dept: REHABILITATION | Facility: HOSPITAL | Age: 43
End: 2023-03-15

## 2023-03-21 ENCOUNTER — CLINICAL SUPPORT (OUTPATIENT)
Dept: REHABILITATION | Facility: HOSPITAL | Age: 43
End: 2023-03-21
Payer: COMMERCIAL

## 2023-03-21 DIAGNOSIS — M25.622 DECREASED RANGE OF MOTION OF LEFT ELBOW: Primary | ICD-10-CM

## 2023-03-21 DIAGNOSIS — R29.898 WEAKNESS OF LEFT UPPER EXTREMITY: ICD-10-CM

## 2023-03-21 DIAGNOSIS — R52 PAIN AGGRAVATED BY LIFTING: ICD-10-CM

## 2023-03-21 PROCEDURE — 97110 THERAPEUTIC EXERCISES: CPT

## 2023-03-21 PROCEDURE — 97530 THERAPEUTIC ACTIVITIES: CPT

## 2023-03-21 NOTE — PROGRESS NOTES
"  Physical Therapy Daily Treatment Note     Name: Felix Issa [facility]  Clinic Number: [id number]    Therapy Diagnosis:   Encounter Diagnoses   Name Primary?    Decreased range of motion of left elbow Yes    Weakness of left upper extremity     Pain aggravated by lifting      Physician: Marcin Viera III, *    Visit Date: 3/21/2023  Physician Orders: PT Eval and Treat  Medical Diagnosis from Referral:   S46.212A (ICD-10-CM) - Rupture of left distal biceps tendon, initial encounter   G89.18 (ICD-10-CM) - Post-operative pain      Evaluation Date: 1/17/2023  Authorization Period Expiration: 12/20/2023  Plan of Care Expiration: 06/01/2023  Visit # / Visits authorized: 6 / 40 (13 total)      Time In: 1008  Time Out: 1104  Total Billable Time: 56 minutes    Precautions: Standard       DOS: 12/8/2022     PROCEDURE PERFORMED:    1. Open revision repair of Left distal biceps tendon (CPT 35329). (complex, -22 modifier)  2. Open Lysis of adhesions to left elbow  3. Removal of hardware left elbow     POSTOPERATIVE CARE: Will keep patient in splint for approximately 14 days. Then brace locked at 90 x 14 days.  At that time, will take out and begin dynamic splinting.      Possible staged need for allograft recon discussed due to the shortened tendon with scar.     Keep at  90° x 4 weeks, then increase range of motion (extension) by 10° per week with only forearm rotation allowed with fully elbow flexed X4 weeks.    Subjective     Pt reports: "I feel great. No pain."    He was compliant with home exercise program.  Response to previous treatment: Good tolerance to new ROM    Pain: Not verbalized /10  Location: L elbow     Objective     Daily Measurements:           Daily Treatment       Felix received therapeutic exercises to develop strength, endurance, ROM, and flexibility for 39 minutes including:  UBE completed for 4/4 min forward & backward to increase ROM, endurance and decrease pain to improve tolerance to ADLs and age " "related activities.   Sagittal plane 90/90 ER IR OTB/BTB 3x12 ea.   DB Head hold pro/supination 3x15 5#  DB Chest Press 20# 3x15   Bicep curls 15# 3x15 ea.   Cable triceps extension 13# 3x15  90/90 Sagittal plane ER/IR OTB/BTB 3x10 ea.     Not performed:   OTB ER 3x12      Felix received the following manual therapy techniques: were applied for 00 minutes, including:      Felix participated in neuromuscular re-education activities to improve: Coordination, Kinesthetic, Sense, Proprioception, and Motor Control for 00 minutes. The following activities were included:    Not performed:  Supine Dynamic Lat mobility drill 2x15   Prone Lvl 3 LT 3x12x3"  Prone Lvl 3 Mt 3x12x3"       Felix participated in dynamic functional therapeutic activities to improve functional performance for 17  minutes, including:  Bent over rows 20# 3x20  Walking Farmer carry 20# w/fat  <-> 60 ft 5x        Home Exercises and Patient Education Provided     Education provided:   - Reviwed precautions and timelines for progression    Written Home Exercises Provided: yes.  Exercises were reviewed and Felix was able to demonstrate them prior to the end of the session.  Felix demonstrated good  understanding of the education provided.     See EMR under patient instructions for exercises given.     Assessment     Felix continues to tolerate progressive loading without adverse response. Progressed biceps loading as well as work specific task training without adverse response.     Felix Is progressing well towards his goals.     Pt will continue to benefit from skilled outpatient physical therapy to address the deficits listed in the problem list box on initial evaluation, provide pt/family education and to maximize pt's level of independence in the home and community environment. Pt prognosis is Good.     Pt's spiritual, cultural and educational needs considered and pt agreeable to plan of care and goals.    Anticipated barriers to physical therapy: " None    Goals:  Short Term Goals: 8 weeks  1. Pt will be compliant with HEP 50% of prescribed amount. (Met)  2. The pt to demo improvement in Elbow ROM to full compared to uninvolved side. (Met)  3.  Pt will tolerate being out of brace for 24 hours without pain (Met)  4. Pt will improve FOTO score to meet or exceed MCID. (Met)     Long Term Goals: 26 weeks   Pt will be compliant with % of prescribed amount.   Pt will demo  strength and isometric elbow strength within 90% LSI measured via HHD.   Pt will report ability to perform a full work day without pain or limitation.   Pt will improve FOTO score to meet or exceed predicted outcome.   The pt will report full participation in ADLs and IADLs without restrictions related to L elbow.     Plan     Outpatient Physical Therapy 2 times weekly for 26 weeks to include the following interventions: Electrical Stimulation IFC/TENS, Manual Therapy, Moist Heat/ Ice, Neuromuscular Re-ed, Patient Education, Self Care, Therapeutic Activities, Therapeutic Exercise, and Dry Needling .     Oren Holcomb, PT , DPT, SCS

## 2023-03-22 ENCOUNTER — PATIENT MESSAGE (OUTPATIENT)
Dept: REHABILITATION | Facility: HOSPITAL | Age: 43
End: 2023-03-22
Payer: COMMERCIAL

## 2023-03-28 ENCOUNTER — PATIENT MESSAGE (OUTPATIENT)
Dept: REHABILITATION | Facility: HOSPITAL | Age: 43
End: 2023-03-28

## 2023-04-04 ENCOUNTER — CLINICAL SUPPORT (OUTPATIENT)
Dept: REHABILITATION | Facility: HOSPITAL | Age: 43
End: 2023-04-04
Payer: COMMERCIAL

## 2023-04-04 DIAGNOSIS — M25.622 DECREASED RANGE OF MOTION OF LEFT ELBOW: Primary | ICD-10-CM

## 2023-04-04 DIAGNOSIS — R29.898 WEAKNESS OF LEFT UPPER EXTREMITY: ICD-10-CM

## 2023-04-04 DIAGNOSIS — R52 PAIN AGGRAVATED BY LIFTING: ICD-10-CM

## 2023-04-04 PROCEDURE — 97530 THERAPEUTIC ACTIVITIES: CPT

## 2023-04-04 PROCEDURE — 97110 THERAPEUTIC EXERCISES: CPT
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
## 2023-04-05 ENCOUNTER — PATIENT MESSAGE (OUTPATIENT)
Dept: REHABILITATION | Facility: HOSPITAL | Age: 43
End: 2023-04-05
Payer: COMMERCIAL

## 2023-04-05 ENCOUNTER — PATIENT MESSAGE (OUTPATIENT)
Dept: PRIMARY CARE CLINIC | Facility: CLINIC | Age: 43
End: 2023-04-05
Payer: COMMERCIAL

## 2023-04-10 ENCOUNTER — PATIENT MESSAGE (OUTPATIENT)
Dept: PRIMARY CARE CLINIC | Facility: CLINIC | Age: 43
End: 2023-04-10
Payer: COMMERCIAL

## 2023-04-11 ENCOUNTER — PATIENT MESSAGE (OUTPATIENT)
Dept: ORTHOPEDICS | Facility: CLINIC | Age: 43
End: 2023-04-11
Payer: COMMERCIAL

## 2023-04-17 ENCOUNTER — PATIENT MESSAGE (OUTPATIENT)
Dept: ORTHOPEDICS | Facility: CLINIC | Age: 43
End: 2023-04-17
Payer: COMMERCIAL

## 2023-04-18 ENCOUNTER — CLINICAL SUPPORT (OUTPATIENT)
Dept: REHABILITATION | Facility: HOSPITAL | Age: 43
End: 2023-04-18
Payer: COMMERCIAL

## 2023-04-18 DIAGNOSIS — M25.622 DECREASED RANGE OF MOTION OF LEFT ELBOW: Primary | ICD-10-CM

## 2023-04-18 DIAGNOSIS — R29.898 WEAKNESS OF LEFT UPPER EXTREMITY: ICD-10-CM

## 2023-04-18 DIAGNOSIS — R52 PAIN AGGRAVATED BY LIFTING: ICD-10-CM

## 2023-04-18 PROCEDURE — 97110 THERAPEUTIC EXERCISES: CPT

## 2023-04-18 PROCEDURE — 97530 THERAPEUTIC ACTIVITIES: CPT

## 2023-04-18 NOTE — PROGRESS NOTES
Physical Therapy Daily Treatment Note     Name: Felix Issa [facility]  Clinic Number: [id number]    Therapy Diagnosis:   Encounter Diagnoses   Name Primary?    Decreased range of motion of left elbow Yes    Weakness of left upper extremity     Pain aggravated by lifting      Physician: Marcin Viera III, *    Visit Date: 4/18/2023  Physician Orders: PT Eval and Treat  Medical Diagnosis from Referral:   S46.212A (ICD-10-CM) - Rupture of left distal biceps tendon, initial encounter   G89.18 (ICD-10-CM) - Post-operative pain      Evaluation Date: 1/17/2023  Authorization Period Expiration: 12/20/2023  Plan of Care Expiration: 06/01/2023  Visit # / Visits authorized: 8 / 40 (15 total)      Time In: 1121 (Pt arrived late)  Time Out: 1200  Total Billable Time: 39 minutes    Precautions: Standard       DOS: 12/8/2022     PROCEDURE PERFORMED:    1. Open revision repair of Left distal biceps tendon (CPT 33429). (complex, -22 modifier)  2. Open Lysis of adhesions to left elbow  3. Removal of hardware left elbow     POSTOPERATIVE CARE: Will keep patient in splint for approximately 14 days. Then brace locked at 90 x 14 days.  At that time, will take out and begin dynamic splinting.      Possible staged need for allograft recon discussed due to the shortened tendon with scar.     Keep at  90° x 4 weeks, then increase range of motion (extension) by 10° per week with only forearm rotation allowed with fully elbow flexed X4 weeks.    Subjective     Pt reports: His elbow is feeling great. He is late today because he got held up at work.    He was compliant with home exercise program.  Response to previous treatment: Good tolerance to new ROM    Pain: Not verbalized /10  Location: L elbow     Objective     Daily Measurements:           Daily Treatment       Felix received therapeutic exercises to develop strength, endurance, ROM, and flexibility for 24 minutes including:  UBE completed for 4/4 min forward & backward to increase  "ROM, endurance and decrease pain to improve tolerance to ADLs and age related activities.   Sagittal plane 90/90 ER IR OTB/BTB 3x12 ea.   Bicep curls 15# 3x15 ea.   Cable triceps extension 13# 3x15      Not performed:   OTB ER 3x12  DB Head hold pro/supination 3x15 5#  DB Chest Press 20# 3x15       Felix received the following manual therapy techniques: were applied for 00 minutes, including:      Felix participated in neuromuscular re-education activities to improve: Coordination, Kinesthetic, Sense, Proprioception, and Motor Control for 00 minutes. The following activities were included:    Not performed:  Supine Dynamic Lat mobility drill 2x15   Prone Lvl 3 LT 3x12x3"  Prone Lvl 3 Mt 3x12x3"       Felix participated in dynamic functional therapeutic activities to improve functional performance for 15  minutes, including:  Bent over rows 25# 3x15  Walking Farmer carry 25# w/fat  <-> 60 ft 5x  Retro Sled Pull 45# <-> 60 ft 3x         Home Exercises and Patient Education Provided     Education provided:   - Reviwed precautions and timelines for progression    Written Home Exercises Provided: yes.  Exercises were reviewed and Felix was able to demonstrate them prior to the end of the session.  Felix demonstrated good  understanding of the education provided.     See EMR under patient instructions for exercises given.     Assessment     Continued with functional training progressions with no c/o pain or other adverse response. Pt continues to maintain full ROM.       Felix Is progressing well towards his goals.     Pt will continue to benefit from skilled outpatient physical therapy to address the deficits listed in the problem list box on initial evaluation, provide pt/family education and to maximize pt's level of independence in the home and community environment. Pt prognosis is Good.     Pt's spiritual, cultural and educational needs considered and pt agreeable to plan of care and goals.    Anticipated " barriers to physical therapy: None    Goals:  Short Term Goals: 8 weeks  1. Pt will be compliant with HEP 50% of prescribed amount. (Met)  2. The pt to demo improvement in Elbow ROM to full compared to uninvolved side. (Met)  3.  Pt will tolerate being out of brace for 24 hours without pain (Met)  4. Pt will improve FOTO score to meet or exceed MCID. (Met)     Long Term Goals: 26 weeks   Pt will be compliant with % of prescribed amount.   Pt will demo  strength and isometric elbow strength within 90% LSI measured via HHD.   Pt will report ability to perform a full work day without pain or limitation.   Pt will improve FOTO score to meet or exceed predicted outcome.   The pt will report full participation in ADLs and IADLs without restrictions related to L elbow.     Plan     Outpatient Physical Therapy 2 times weekly for 26 weeks to include the following interventions: Electrical Stimulation IFC/TENS, Manual Therapy, Moist Heat/ Ice, Neuromuscular Re-ed, Patient Education, Self Care, Therapeutic Activities, Therapeutic Exercise, and Dry Needling .     Oren Holcomb, PT , DPT, SCS

## 2023-04-19 ENCOUNTER — LAB VISIT (OUTPATIENT)
Dept: LAB | Facility: HOSPITAL | Age: 43
End: 2023-04-19
Attending: STUDENT IN AN ORGANIZED HEALTH CARE EDUCATION/TRAINING PROGRAM
Payer: COMMERCIAL

## 2023-04-19 ENCOUNTER — OFFICE VISIT (OUTPATIENT)
Dept: PRIMARY CARE CLINIC | Facility: CLINIC | Age: 43
End: 2023-04-19
Payer: COMMERCIAL

## 2023-04-19 VITALS
WEIGHT: 175.5 LBS | HEART RATE: 78 BPM | HEIGHT: 66 IN | OXYGEN SATURATION: 96 % | BODY MASS INDEX: 28.21 KG/M2 | SYSTOLIC BLOOD PRESSURE: 146 MMHG | DIASTOLIC BLOOD PRESSURE: 102 MMHG

## 2023-04-19 DIAGNOSIS — I10 HTN (HYPERTENSION), BENIGN: Primary | ICD-10-CM

## 2023-04-19 DIAGNOSIS — R79.89 LOW T4: ICD-10-CM

## 2023-04-19 DIAGNOSIS — G43.909 MIGRAINE WITHOUT STATUS MIGRAINOSUS, NOT INTRACTABLE, UNSPECIFIED MIGRAINE TYPE: ICD-10-CM

## 2023-04-19 LAB — TSH SERPL DL<=0.005 MIU/L-ACNC: 0.69 UIU/ML (ref 0.4–4)

## 2023-04-19 PROCEDURE — 1159F MED LIST DOCD IN RCRD: CPT | Mod: CPTII,S$GLB,, | Performed by: STUDENT IN AN ORGANIZED HEALTH CARE EDUCATION/TRAINING PROGRAM

## 2023-04-19 PROCEDURE — 99214 PR OFFICE/OUTPT VISIT, EST, LEVL IV, 30-39 MIN: ICD-10-PCS | Mod: S$GLB,,, | Performed by: STUDENT IN AN ORGANIZED HEALTH CARE EDUCATION/TRAINING PROGRAM

## 2023-04-19 PROCEDURE — 1160F PR REVIEW ALL MEDS BY PRESCRIBER/CLIN PHARMACIST DOCUMENTED: ICD-10-PCS | Mod: CPTII,S$GLB,, | Performed by: STUDENT IN AN ORGANIZED HEALTH CARE EDUCATION/TRAINING PROGRAM

## 2023-04-19 PROCEDURE — 3077F PR MOST RECENT SYSTOLIC BLOOD PRESSURE >= 140 MM HG: ICD-10-PCS | Mod: CPTII,S$GLB,, | Performed by: STUDENT IN AN ORGANIZED HEALTH CARE EDUCATION/TRAINING PROGRAM

## 2023-04-19 PROCEDURE — 99999 PR PBB SHADOW E&M-EST. PATIENT-LVL IV: CPT | Mod: PBBFAC,,, | Performed by: STUDENT IN AN ORGANIZED HEALTH CARE EDUCATION/TRAINING PROGRAM

## 2023-04-19 PROCEDURE — 3080F DIAST BP >= 90 MM HG: CPT | Mod: CPTII,S$GLB,, | Performed by: STUDENT IN AN ORGANIZED HEALTH CARE EDUCATION/TRAINING PROGRAM

## 2023-04-19 PROCEDURE — 99999 PR PBB SHADOW E&M-EST. PATIENT-LVL IV: ICD-10-PCS | Mod: PBBFAC,,, | Performed by: STUDENT IN AN ORGANIZED HEALTH CARE EDUCATION/TRAINING PROGRAM

## 2023-04-19 PROCEDURE — 3077F SYST BP >= 140 MM HG: CPT | Mod: CPTII,S$GLB,, | Performed by: STUDENT IN AN ORGANIZED HEALTH CARE EDUCATION/TRAINING PROGRAM

## 2023-04-19 PROCEDURE — 3080F PR MOST RECENT DIASTOLIC BLOOD PRESSURE >= 90 MM HG: ICD-10-PCS | Mod: CPTII,S$GLB,, | Performed by: STUDENT IN AN ORGANIZED HEALTH CARE EDUCATION/TRAINING PROGRAM

## 2023-04-19 PROCEDURE — 3008F PR BODY MASS INDEX (BMI) DOCUMENTED: ICD-10-PCS | Mod: CPTII,S$GLB,, | Performed by: STUDENT IN AN ORGANIZED HEALTH CARE EDUCATION/TRAINING PROGRAM

## 2023-04-19 PROCEDURE — 36415 COLL VENOUS BLD VENIPUNCTURE: CPT | Performed by: STUDENT IN AN ORGANIZED HEALTH CARE EDUCATION/TRAINING PROGRAM

## 2023-04-19 PROCEDURE — 84443 ASSAY THYROID STIM HORMONE: CPT | Performed by: STUDENT IN AN ORGANIZED HEALTH CARE EDUCATION/TRAINING PROGRAM

## 2023-04-19 PROCEDURE — 4010F PR ACE/ARB THEARPY RXD/TAKEN: ICD-10-PCS | Mod: CPTII,S$GLB,, | Performed by: STUDENT IN AN ORGANIZED HEALTH CARE EDUCATION/TRAINING PROGRAM

## 2023-04-19 PROCEDURE — 1160F RVW MEDS BY RX/DR IN RCRD: CPT | Mod: CPTII,S$GLB,, | Performed by: STUDENT IN AN ORGANIZED HEALTH CARE EDUCATION/TRAINING PROGRAM

## 2023-04-19 PROCEDURE — 1159F PR MEDICATION LIST DOCUMENTED IN MEDICAL RECORD: ICD-10-PCS | Mod: CPTII,S$GLB,, | Performed by: STUDENT IN AN ORGANIZED HEALTH CARE EDUCATION/TRAINING PROGRAM

## 2023-04-19 PROCEDURE — 84436 ASSAY OF TOTAL THYROXINE: CPT | Performed by: STUDENT IN AN ORGANIZED HEALTH CARE EDUCATION/TRAINING PROGRAM

## 2023-04-19 PROCEDURE — 3008F BODY MASS INDEX DOCD: CPT | Mod: CPTII,S$GLB,, | Performed by: STUDENT IN AN ORGANIZED HEALTH CARE EDUCATION/TRAINING PROGRAM

## 2023-04-19 PROCEDURE — 99214 OFFICE O/P EST MOD 30 MIN: CPT | Mod: S$GLB,,, | Performed by: STUDENT IN AN ORGANIZED HEALTH CARE EDUCATION/TRAINING PROGRAM

## 2023-04-19 PROCEDURE — 4010F ACE/ARB THERAPY RXD/TAKEN: CPT | Mod: CPTII,S$GLB,, | Performed by: STUDENT IN AN ORGANIZED HEALTH CARE EDUCATION/TRAINING PROGRAM

## 2023-04-19 RX ORDER — FLUTICASONE PROPIONATE 50 MCG
2 SPRAY, SUSPENSION (ML) NASAL
COMMUNITY
Start: 2022-10-26 | End: 2024-01-04

## 2023-04-19 RX ORDER — IBUPROFEN 800 MG/1
800 TABLET ORAL 3 TIMES DAILY
COMMUNITY
Start: 2022-12-05 | End: 2024-01-04

## 2023-04-19 RX ORDER — AMOXICILLIN AND CLAVULANATE POTASSIUM 875; 125 MG/1; MG/1
1 TABLET, FILM COATED ORAL EVERY 12 HOURS
COMMUNITY
Start: 2022-10-26 | End: 2023-04-28

## 2023-04-19 RX ORDER — AZITHROMYCIN 250 MG/1
TABLET, FILM COATED ORAL
COMMUNITY
Start: 2022-12-05 | End: 2023-04-28

## 2023-04-19 RX ORDER — AMLODIPINE BESYLATE 10 MG/1
10 TABLET ORAL DAILY
Qty: 90 TABLET | Refills: 3 | Status: SHIPPED | OUTPATIENT
Start: 2023-04-19 | End: 2024-04-18

## 2023-04-19 NOTE — PROGRESS NOTES
Subjective:       Patient ID: Felix Stern is a 42 y.o. male with hypertension, mild intermittent asthma, chronic seasonal allergies, migraines, and GERD.  Chief Complaint: Follow-up      HTN -   Losartan 100 mg daily  Patient not currently checking BP at home.  + occasional headaches  Denies  vision changes, CP, palpitations, or other concerning symptoms.   No results found for: MICALBCREAT  BP Readings from Last 3 Encounters:   04/19/23 (!) 146/102   02/17/23 (!) 145/103   02/10/23 (!) 162/110     Recent free T4 on blood work 02/17/2023 low.  Due for repeat level.    Migraine headaches:  <10 headache days/month  Frontal headache  Nsaids/Fioricet prn for migraines  No aura  +nausea, no vomiting    Review of Systems   Constitutional:  Negative for fever.   Respiratory:  Negative for shortness of breath.    Cardiovascular:  Negative for chest pain.   Genitourinary:  Negative for dysuria, flank pain and urgency.   Musculoskeletal:  Negative for back pain, gait problem and leg pain.   Integumentary:  Negative for rash.   Neurological:  Positive for headaches. Negative for weakness and numbness.            Objective:        Physical Exam  HENT:      Head: Normocephalic and atraumatic.      Nose: Nose normal.      Mouth/Throat:      Mouth: Mucous membranes are moist.      Pharynx: Oropharynx is clear.   Eyes:      Extraocular Movements: Extraocular movements intact.      Conjunctiva/sclera: Conjunctivae normal.      Pupils: Pupils are equal, round, and reactive to light.   Cardiovascular:      Rate and Rhythm: Normal rate and regular rhythm.   Pulmonary:      Effort: Pulmonary effort is normal.   Musculoskeletal:         General: No swelling. Normal range of motion.      Cervical back: Normal range of motion.      Right lower leg: No edema.      Left lower leg: No edema.   Skin:     General: Skin is warm.      Findings: No lesion or rash.   Neurological:      General: No focal deficit present.      Mental  Status: He is alert and oriented to person, place, and time.      Motor: No weakness.       Assessment:         Problem List Items Addressed This Visit          Neuro    Migraine without status migrainosus, not intractable     Stable on medications, continue regimen                Cardiac/Vascular    HTN (hypertension), benign - Primary    Relevant Medications    amLODIPine (NORVASC) 10 MG tablet     Other Visit Diagnoses       Low T4        Relevant Orders    T4    TSH                Plan:         1. HTN (hypertension), benign  Overview:  Losartan 100mg   Add amlodipine 10 mg daily.  Keep bp log for 1 mo  Follow up in 1 month for bp check and med titration    Orders:  -     amLODIPine (NORVASC) 10 MG tablet; Take 1 tablet (10 mg total) by mouth once daily.  Dispense: 90 tablet; Refill: 3    2. Low T4  -     T4; Future; Expected date: 04/19/2023  -     TSH; Future; Expected date: 04/19/2023    3. Migraine without status migrainosus, not intractable, unspecified migraine type  Overview:  Tylenol prn headaches    Assessment & Plan:  Stable on medications, continue regimen              Follow up in 1 month    Vilma Mñuoz MD

## 2023-04-20 LAB — T4 SERPL-MCNC: 5.1 UG/DL (ref 4.5–11.5)

## 2023-04-25 ENCOUNTER — CLINICAL SUPPORT (OUTPATIENT)
Dept: REHABILITATION | Facility: HOSPITAL | Age: 43
End: 2023-04-25
Payer: COMMERCIAL

## 2023-04-25 DIAGNOSIS — R29.898 WEAKNESS OF LEFT UPPER EXTREMITY: ICD-10-CM

## 2023-04-25 DIAGNOSIS — R52 PAIN AGGRAVATED BY LIFTING: ICD-10-CM

## 2023-04-25 DIAGNOSIS — M25.622 DECREASED RANGE OF MOTION OF LEFT ELBOW: Primary | ICD-10-CM

## 2023-04-25 PROCEDURE — 97530 THERAPEUTIC ACTIVITIES: CPT

## 2023-04-25 PROCEDURE — 97112 NEUROMUSCULAR REEDUCATION: CPT

## 2023-04-25 PROCEDURE — 97110 THERAPEUTIC EXERCISES: CPT

## 2023-04-25 NOTE — PROGRESS NOTES
Physical Therapy Daily Treatment Note     Name: Felix Issa [facility]  Clinic Number: [id number]    Therapy Diagnosis:   Encounter Diagnoses   Name Primary?    Decreased range of motion of left elbow Yes    Weakness of left upper extremity     Pain aggravated by lifting      Physician: Marcin Viera III, *    Visit Date: 4/25/2023  Physician Orders: PT Eval and Treat  Medical Diagnosis from Referral:   S46.212A (ICD-10-CM) - Rupture of left distal biceps tendon, initial encounter   G89.18 (ICD-10-CM) - Post-operative pain      Evaluation Date: 1/17/2023  Authorization Period Expiration: 12/20/2023  Plan of Care Expiration: 06/01/2023  Visit # / Visits authorized: 9 / 40 (16 total)      Time In: 0907  Time Out: 1000  Total Billable Time: 53 minutes    Precautions: Standard       DOS: 12/8/2022     PROCEDURE PERFORMED:    1. Open revision repair of Left distal biceps tendon (CPT 79780). (complex, -22 modifier)  2. Open Lysis of adhesions to left elbow  3. Removal of hardware left elbow     POSTOPERATIVE CARE: Will keep patient in splint for approximately 14 days. Then brace locked at 90 x 14 days.  At that time, will take out and begin dynamic splinting.      Possible staged need for allograft recon discussed due to the shortened tendon with scar.     Keep at  90° x 4 weeks, then increase range of motion (extension) by 10° per week with only forearm rotation allowed with fully elbow flexed X4 weeks.    Subjective     Pt reports: No new complaints. He had no soreness with last week's progressions.     He was compliant with home exercise program.  Response to previous treatment: Good tolerance to new ROM    Pain: Not verbalized /10  Location: L elbow     Objective     Daily Measurements:           Daily Treatment       Felix received therapeutic exercises to develop strength, endurance, ROM, and flexibility for 26 minutes including:  UBE completed for 4/4 min forward & backward to increase ROM, endurance and  "decrease pain to improve tolerance to ADLs and age related activities.   Sagittal plane 90/90 ER IR OTB/BTB 3x12 ea.   Seated OH Shoulder press 15# (B) 3x10       Not performed:   OTB ER 3x12  DB Head hold pro/supination 3x15 5#  DB Chest Press 20# 3x15   Bicep curls 15# 3x15 ea.   Cable triceps extension 13# 3x15      Felix received the following manual therapy techniques: were applied for 00 minutes, including:      Felix participated in neuromuscular re-education activities to improve: Coordination, Kinesthetic, Sense, Proprioception, and Motor Control for 12 minutes. The following activities were included:  Prone Lvl 3 LT 3x12x3"  Prone Lvl 3 Mt 3x12x3"     Not performed:  Supine Dynamic Lat mobility drill 2x15         Felix participated in dynamic functional therapeutic activities to improve functional performance for 15  minutes, including:  Bent over rows 25# 3x15  Retro Sled Pull 45# <-> 60 ft 3x     Not performed:   Walking Farmer carry 25# w/fat  <-> 60 ft 5x    Home Exercises and Patient Education Provided     Education provided:   - Reviwed precautions and timelines for progression    Written Home Exercises Provided: yes.  Exercises were reviewed and Felix was able to demonstrate them prior to the end of the session.  Felix demonstrated good  understanding of the education provided.     See EMR under patient instructions for exercises given.     Assessment     Continued with functional training progressions with no c/o pain or other adverse response. Pt continues to maintain full ROM. Pt advised to increased work related tasks as tolerated.       Felix Is progressing well towards his goals.     Pt will continue to benefit from skilled outpatient physical therapy to address the deficits listed in the problem list box on initial evaluation, provide pt/family education and to maximize pt's level of independence in the home and community environment. Pt prognosis is Good.     Pt's spiritual, cultural " and educational needs considered and pt agreeable to plan of care and goals.    Anticipated barriers to physical therapy: None    Goals:  Short Term Goals: 8 weeks  1. Pt will be compliant with HEP 50% of prescribed amount. (Met)  2. The pt to demo improvement in Elbow ROM to full compared to uninvolved side. (Met)  3.  Pt will tolerate being out of brace for 24 hours without pain (Met)  4. Pt will improve FOTO score to meet or exceed MCID. (Met)     Long Term Goals: 26 weeks   Pt will be compliant with % of prescribed amount.   Pt will demo  strength and isometric elbow strength within 90% LSI measured via HHD.   Pt will report ability to perform a full work day without pain or limitation.   Pt will improve FOTO score to meet or exceed predicted outcome.   The pt will report full participation in ADLs and IADLs without restrictions related to L elbow.     Plan     Outpatient Physical Therapy 2 times weekly for 26 weeks to include the following interventions: Electrical Stimulation IFC/TENS, Manual Therapy, Moist Heat/ Ice, Neuromuscular Re-ed, Patient Education, Self Care, Therapeutic Activities, Therapeutic Exercise, and Dry Needling .     Oren Holcomb, PT , DPT, SCS

## 2023-04-28 ENCOUNTER — HOSPITAL ENCOUNTER (OUTPATIENT)
Dept: RADIOLOGY | Facility: HOSPITAL | Age: 43
Discharge: HOME | End: 2023-04-28
Attending: NURSE PRACTITIONER
Payer: COMMERCIAL

## 2023-04-28 ENCOUNTER — OFFICE VISIT (OUTPATIENT)
Dept: ORTHOPEDICS | Facility: CLINIC | Age: 43
End: 2023-04-28
Payer: COMMERCIAL

## 2023-04-28 VITALS — HEIGHT: 66 IN | BODY MASS INDEX: 28.21 KG/M2 | WEIGHT: 175.5 LBS

## 2023-04-28 DIAGNOSIS — M25.561 ACUTE PAIN OF BOTH KNEES: ICD-10-CM

## 2023-04-28 DIAGNOSIS — M25.562 ACUTE PAIN OF BOTH KNEES: Primary | ICD-10-CM

## 2023-04-28 DIAGNOSIS — M25.562 ACUTE PAIN OF BOTH KNEES: ICD-10-CM

## 2023-04-28 DIAGNOSIS — M79.2 NEURALGIA OF LEFT LOWER EXTREMITY: Primary | ICD-10-CM

## 2023-04-28 DIAGNOSIS — M25.561 ACUTE PAIN OF BOTH KNEES: Primary | ICD-10-CM

## 2023-04-28 PROCEDURE — 99214 PR OFFICE/OUTPT VISIT, EST, LEVL IV, 30-39 MIN: ICD-10-PCS | Mod: S$GLB,,, | Performed by: NURSE PRACTITIONER

## 2023-04-28 PROCEDURE — 3008F PR BODY MASS INDEX (BMI) DOCUMENTED: ICD-10-PCS | Mod: CPTII,S$GLB,, | Performed by: NURSE PRACTITIONER

## 2023-04-28 PROCEDURE — 1159F PR MEDICATION LIST DOCUMENTED IN MEDICAL RECORD: ICD-10-PCS | Mod: CPTII,S$GLB,, | Performed by: NURSE PRACTITIONER

## 2023-04-28 PROCEDURE — 1159F MED LIST DOCD IN RCRD: CPT | Mod: CPTII,S$GLB,, | Performed by: NURSE PRACTITIONER

## 2023-04-28 PROCEDURE — 99999 PR PBB SHADOW E&M-EST. PATIENT-LVL IV: ICD-10-PCS | Mod: PBBFAC,,, | Performed by: NURSE PRACTITIONER

## 2023-04-28 PROCEDURE — 99999 PR PBB SHADOW E&M-EST. PATIENT-LVL IV: CPT | Mod: PBBFAC,,, | Performed by: NURSE PRACTITIONER

## 2023-04-28 PROCEDURE — 1160F RVW MEDS BY RX/DR IN RCRD: CPT | Mod: CPTII,S$GLB,, | Performed by: NURSE PRACTITIONER

## 2023-04-28 PROCEDURE — 4010F ACE/ARB THERAPY RXD/TAKEN: CPT | Mod: CPTII,S$GLB,, | Performed by: NURSE PRACTITIONER

## 2023-04-28 PROCEDURE — 4010F PR ACE/ARB THEARPY RXD/TAKEN: ICD-10-PCS | Mod: CPTII,S$GLB,, | Performed by: NURSE PRACTITIONER

## 2023-04-28 PROCEDURE — 1160F PR REVIEW ALL MEDS BY PRESCRIBER/CLIN PHARMACIST DOCUMENTED: ICD-10-PCS | Mod: CPTII,S$GLB,, | Performed by: NURSE PRACTITIONER

## 2023-04-28 PROCEDURE — 3008F BODY MASS INDEX DOCD: CPT | Mod: CPTII,S$GLB,, | Performed by: NURSE PRACTITIONER

## 2023-04-28 PROCEDURE — 99214 OFFICE O/P EST MOD 30 MIN: CPT | Mod: S$GLB,,, | Performed by: NURSE PRACTITIONER

## 2023-04-28 NOTE — PROGRESS NOTES
"  SUBJECTIVE:     Chief Complaint & History of Present Illness:  Felix Stern is a Established 42 y.o. year old male patient here for follow up for his left knee issues.  He was seen last month for bilateral knee pain.  He noticed the pain when kneeling.  He describes the pain as a "numbness" on the lateral side of the knee.  It does not radiate.  No reports of trauma.  At his last visit, he was prescribed Mobic which helped but did not relieve his symptoms.      There is not catching or locking.  Aggravating factors include kneeling.  Associated symptoms include none.  There is not numbness or tingling of the lower extremity.  There is not back pain.  There is not a history of previous injury or surgery to the knee.  The patient does not use an assistive device.    In summary patient complains of numbing sensation to the lateral aspect of his knees worse on the left only when kneeling.  He states he is trying to remain active as he has an 8-year-old at home that he is trying to stay healthy for.      Review of patient's allergies indicates:  No Known Allergies      Current Outpatient Medications   Medication Sig Dispense Refill    acyclovir (ZOVIRAX) 400 MG tablet       amLODIPine (NORVASC) 10 MG tablet Take 1 tablet (10 mg total) by mouth once daily. 90 tablet 3    amoxicillin-clavulanate 875-125mg (AUGMENTIN) 875-125 mg per tablet Take 1 tablet by mouth every 12 (twelve) hours.      azithromycin (Z-COLTON) 250 MG tablet TAKE 2 TABLETS BY MOUTH TODAY, THEN TAKE 1 TABLET DAILY FOR 4 DAYS      citalopram (CELEXA) 20 MG tablet Take 20 mg by mouth once daily.      esomeprazole magnesium (NEXIUM) 10 mg suspension Take 10 mg by mouth before breakfast.      famotidine (PEPCID) 10 MG tablet Take 10 mg by mouth 2 (two) times daily.      fluticasone propionate (FLONASE) 50 mcg/actuation nasal spray 2 sprays by Each Nostril route.      ibuprofen (ADVIL,MOTRIN) 800 MG tablet Take 800 mg by mouth 3 (three) times daily.  "     losartan (COZAAR) 100 MG tablet Take 1 tablet (100 mg total) by mouth once daily. 90 tablet 3    meloxicam (MOBIC) 15 MG tablet Take 1 tablet (15 mg total) by mouth once daily. 30 tablet 2    multivitamin (THERAGRAN) per tablet Take 1 tablet by mouth once daily.      oxyCODONE-acetaminophen (PERCOCET)  mg per tablet Take 1 tablet by mouth every 4-6 hours as needed for pain. Take stool softener with this medication. 21 tablet 0    traMADoL (ULTRAM) 50 mg tablet Take 1-2 tablets ( mg total) by mouth every 6 (six) hours as needed for Pain. 21 tablet 0    VENTOLIN HFA 90 mcg/actuation inhaler       sumatriptan (IMITREX) 25 MG Tab Take 2 tablets (50 mg total) by mouth every 6 (six) hours as needed (migraine). 15 tablet 0     No current facility-administered medications for this visit.       Past Medical History:   Diagnosis Date    GERD (gastroesophageal reflux disease)     HTN (hypertension), benign 2/10/2023       Past Surgical History:   Procedure Laterality Date    HAND SURGERY      REPAIR,TENDON,DISTAL PROXIMAL Left 12/8/2022    Procedure: REPAIR,TENDON,DISTAL PROXIMAL;  Surgeon: Jossy Samayoa MD;  Location: [facility];  Service: Orthopedics;  Laterality: Left;  Distal bicep repair    VASECTOMY         Family History   Problem Relation Age of Onset    Colon cancer Neg Hx     Esophageal cancer Neg Hx     Stomach cancer Neg Hx     Rectal cancer Neg Hx      Review of Systems:  ROS:  Constitutional: no fever or chills  Eyes: no visual changes  ENT: no nasal congestion or sore throat  Respiratory: no cough or shortness of breath  Cardiovascular: no chest pain or palpitations  Gastrointestinal: no nausea or vomiting, tolerating diet  Genitourinary: no hematuria or dysuria  Integument/Breast: no rash or pruritis  Hematologic/Lymphatic: no easy bruising or lymphadenopathy  Musculoskeletal: no arthralgias or myalgias  Neurological: no seizures or tremors  Behavioral/Psych: no auditory or visual  "hallucinations  Endocrine: no heat or cold intolerance      OBJECTIVE:     PHYSICAL EXAM:  Vital Signs (Most Recent)  There were no vitals filed for this visit.  Height: 5' 6" (167.6 cm) Weight: 79.6 kg (175 lb 7.8 oz),   Estimated body mass index is 28.32 kg/m² as calculated from the following:    Height as of this encounter: 5' 6" (1.676 m).    Weight as of this encounter: 79.6 kg (175 lb 7.8 oz).   General Appearance: Well nourished, well developed, in no acute distress.  HENT: Normal cephalic, oropharynx pink and moist  Eyes: PERRLA bilaterally and EOM x 4  Respiratory: Even and unlabored  Skin: Warm and Dry.   Psychiatric: AAO x 4, Mood & affect are normal.    Left  Knee Exam:  Knee Range of Motion:normal   Effusion:none  Condition of skin:intact  Location of tenderness:Lateral joint line   Strength:normal  Stability:  stable to testing, Lachman: stable, LCL: stable, MCL: stable, and PCL: stable  Varus /Valgus stress:  normal  Alba:   negative    Hip Examination:  normal    RADIOGRAPHS:  none    ASSESSMENT/PLAN:       ICD-10-CM ICD-9-CM   1. Neuralgia of left lower extremity  M79.2 355.8       -Felixnaty Stern presents to clinic today with c/c left knee numbness that he only feels when the area his "hit" or when he kneels.    His symptoms appears to be nerve related.  -Continue Mobic 15 mg PO daily.   -Refer to Neurology  Future Appointments   Date Time Provider Department Center   5/2/2023  9:00 AM [facility]   5/8/2023  1:00 PM Jossy Samayoa MD [facility] SP[facility] [place]   5/9/2023  9:00 AM [facility]comb [facility]   5/16/2023  9:00 AM [facility]comb [facility]           "

## 2023-05-02 ENCOUNTER — CLINICAL SUPPORT (OUTPATIENT)
Dept: REHABILITATION | Facility: HOSPITAL | Age: 43
End: 2023-05-02
Payer: COMMERCIAL

## 2023-05-02 DIAGNOSIS — R29.898 WEAKNESS OF LEFT UPPER EXTREMITY: ICD-10-CM

## 2023-05-02 DIAGNOSIS — R52 PAIN AGGRAVATED BY LIFTING: ICD-10-CM

## 2023-05-02 DIAGNOSIS — M25.622 DECREASED RANGE OF MOTION OF LEFT ELBOW: Primary | ICD-10-CM

## 2023-05-02 PROCEDURE — 97110 THERAPEUTIC EXERCISES: CPT

## 2023-05-02 PROCEDURE — 97530 THERAPEUTIC ACTIVITIES: CPT

## 2023-05-02 NOTE — PROGRESS NOTES
Physical Therapy Daily Treatment Note     Name: Felix Issa [facility]  Clinic Number: [id number]    Therapy Diagnosis:   Encounter Diagnoses   Name Primary?    Decreased range of motion of left elbow Yes    Weakness of left upper extremity     Pain aggravated by lifting      Physician: Marcin Viera III, *    Visit Date: 5/2/2023  Physician Orders: PT Eval and Treat  Medical Diagnosis from Referral:   S46.212A (ICD-10-CM) - Rupture of left distal biceps tendon, initial encounter   G89.18 (ICD-10-CM) - Post-operative pain      Evaluation Date: 1/17/2023  Authorization Period Expiration: 12/20/2023  Plan of Care Expiration: 06/01/2023  Visit # / Visits authorized: 10 / 40 (17 total)      Time In: 0906  Time Out: 1000  Total Billable Time: 54 minutes    Precautions: Standard       DOS: 12/8/2022     PROCEDURE PERFORMED:    1. Open revision repair of Left distal biceps tendon (CPT 46767). (complex, -22 modifier)  2. Open Lysis of adhesions to left elbow  3. Removal of hardware left elbow     POSTOPERATIVE CARE: Will keep patient in splint for approximately 14 days. Then brace locked at 90 x 14 days.  At that time, will take out and begin dynamic splinting.      Possible staged need for allograft recon discussed due to the shortened tendon with scar.     Keep at  90° x 4 weeks, then increase range of motion (extension) by 10° per week with only forearm rotation allowed with fully elbow flexed X4 weeks.    Subjective     Pt reports: His elbow feels great. He does report some mild tightness in his anterior-medial forearm but no pain. He has been increasing his activity level at work but making sure not to lift or carry anything heavier than he does with his PT exercises.     He was compliant with home exercise program.  Response to previous treatment: Good tolerance to new ROM    Pain: Not verbalized /10  Location: L elbow     Objective     Daily Measurements:           Daily Treatment       Felix received therapeutic  "exercises to develop strength, endurance, ROM, and flexibility for 42 minutes including:  Sagittal plane 90/90 ER IR OTB/BTB 3x12 ea.   Seated OH Shoulder press 15# (B) 3x10   Eccentric Biceps flexion 3# 3x12   Cable triceps extension 13# 3x15  Bicep curls 15# 3x15 ea.   Flexor/pronator stretch 4x20"  Flexor Pronator foam roll x3 mins    Not performed:   DB Head hold pro/supination 3x15 5#  DB Chest Press 20# 3x15         Felix received the following manual therapy techniques: were applied for 00 minutes, including:      Felix participated in neuromuscular re-education activities to improve: Coordination, Kinesthetic, Sense, Proprioception, and Motor Control for 00 minutes. The following activities were included:    Not performed:  Supine Dynamic Lat mobility drill 2x15   Prone Lvl 3 LT 3x12x3"  Prone Lvl 3 Mt 3x12x3"         Felix participated in dynamic functional therapeutic activities to improve functional performance for 12  minutes, including:  Walking El carry 25# w/fat  <-> 60 ft 5x    Not performed:   Bent over rows 25# 3x15  Retro Sled Pull 45# <-> 60 ft 3x     Home Exercises and Patient Education Provided     Education provided:   - Reviwed precautions and timelines for progression    Written Home Exercises Provided: yes.  Exercises were reviewed and Felix was able to demonstrate them prior to the end of the session.  Felix demonstrated good  understanding of the education provided.     See EMR under patient instructions for exercises given.     Assessment     Felix farnsworth's mild flexor/pronator decreased extensibility which improved with self-MFR and flexor/pronator gentle stretching. Continued with functional strengthening with no adverse response. HEP updated to include flexor/pronator extensibility interventions.       Felix Is progressing well towards his goals.     Pt will continue to benefit from skilled outpatient physical therapy to address the deficits listed in the problem list box on " initial evaluation, provide pt/family education and to maximize pt's level of independence in the home and community environment. Pt prognosis is Good.     Pt's spiritual, cultural and educational needs considered and pt agreeable to plan of care and goals.    Anticipated barriers to physical therapy: None    Goals:  Short Term Goals: 8 weeks  1. Pt will be compliant with HEP 50% of prescribed amount. (Met)  2. The pt to demo improvement in Elbow ROM to full compared to uninvolved side. (Met)  3.  Pt will tolerate being out of brace for 24 hours without pain (Met)  4. Pt will improve FOTO score to meet or exceed MCID. (Met)     Long Term Goals: 26 weeks   Pt will be compliant with % of prescribed amount.   Pt will demo  strength and isometric elbow strength within 90% LSI measured via HHD.   Pt will report ability to perform a full work day without pain or limitation.   Pt will improve FOTO score to meet or exceed predicted outcome.   The pt will report full participation in ADLs and IADLs without restrictions related to L elbow.     Plan     Outpatient Physical Therapy 2 times weekly for 26 weeks to include the following interventions: Electrical Stimulation IFC/TENS, Manual Therapy, Moist Heat/ Ice, Neuromuscular Re-ed, Patient Education, Self Care, Therapeutic Activities, Therapeutic Exercise, and Dry Needling .     Oren Holcomb, PT , DPT, SCS

## 2023-05-08 ENCOUNTER — OFFICE VISIT (OUTPATIENT)
Dept: SPORTS MEDICINE | Facility: CLINIC | Age: 43
End: 2023-05-08
Payer: COMMERCIAL

## 2023-05-08 VITALS
DIASTOLIC BLOOD PRESSURE: 98 MMHG | HEART RATE: 88 BPM | HEIGHT: 66 IN | SYSTOLIC BLOOD PRESSURE: 150 MMHG | WEIGHT: 176 LBS | BODY MASS INDEX: 28.28 KG/M2

## 2023-05-08 DIAGNOSIS — Z09 S/P ORTHOPEDIC SURGERY, FOLLOW-UP EXAM: ICD-10-CM

## 2023-05-08 DIAGNOSIS — S46.212D RUPTURE OF LEFT DISTAL BICEPS TENDON, SUBSEQUENT ENCOUNTER: Primary | ICD-10-CM

## 2023-05-08 PROCEDURE — 3080F DIAST BP >= 90 MM HG: CPT | Mod: CPTII,S$GLB,, | Performed by: ORTHOPAEDIC SURGERY

## 2023-05-08 PROCEDURE — 3077F SYST BP >= 140 MM HG: CPT | Mod: CPTII,S$GLB,, | Performed by: ORTHOPAEDIC SURGERY

## 2023-05-08 PROCEDURE — 1159F PR MEDICATION LIST DOCUMENTED IN MEDICAL RECORD: ICD-10-PCS | Mod: CPTII,S$GLB,, | Performed by: ORTHOPAEDIC SURGERY

## 2023-05-08 PROCEDURE — 3080F PR MOST RECENT DIASTOLIC BLOOD PRESSURE >= 90 MM HG: ICD-10-PCS | Mod: CPTII,S$GLB,, | Performed by: ORTHOPAEDIC SURGERY

## 2023-05-08 PROCEDURE — 99999 PR PBB SHADOW E&M-EST. PATIENT-LVL III: CPT | Mod: PBBFAC,,, | Performed by: ORTHOPAEDIC SURGERY

## 2023-05-08 PROCEDURE — 99214 OFFICE O/P EST MOD 30 MIN: CPT | Mod: S$GLB,,, | Performed by: ORTHOPAEDIC SURGERY

## 2023-05-08 PROCEDURE — 99999 PR PBB SHADOW E&M-EST. PATIENT-LVL III: ICD-10-PCS | Mod: PBBFAC,,, | Performed by: ORTHOPAEDIC SURGERY

## 2023-05-08 PROCEDURE — 4010F PR ACE/ARB THEARPY RXD/TAKEN: ICD-10-PCS | Mod: CPTII,S$GLB,, | Performed by: ORTHOPAEDIC SURGERY

## 2023-05-08 PROCEDURE — 3077F PR MOST RECENT SYSTOLIC BLOOD PRESSURE >= 140 MM HG: ICD-10-PCS | Mod: CPTII,S$GLB,, | Performed by: ORTHOPAEDIC SURGERY

## 2023-05-08 PROCEDURE — 99214 PR OFFICE/OUTPT VISIT, EST, LEVL IV, 30-39 MIN: ICD-10-PCS | Mod: S$GLB,,, | Performed by: ORTHOPAEDIC SURGERY

## 2023-05-08 PROCEDURE — 3008F BODY MASS INDEX DOCD: CPT | Mod: CPTII,S$GLB,, | Performed by: ORTHOPAEDIC SURGERY

## 2023-05-08 PROCEDURE — 3008F PR BODY MASS INDEX (BMI) DOCUMENTED: ICD-10-PCS | Mod: CPTII,S$GLB,, | Performed by: ORTHOPAEDIC SURGERY

## 2023-05-08 PROCEDURE — 1159F MED LIST DOCD IN RCRD: CPT | Mod: CPTII,S$GLB,, | Performed by: ORTHOPAEDIC SURGERY

## 2023-05-08 PROCEDURE — 4010F ACE/ARB THERAPY RXD/TAKEN: CPT | Mod: CPTII,S$GLB,, | Performed by: ORTHOPAEDIC SURGERY

## 2023-05-08 NOTE — PROGRESS NOTES
Chief Complaint:  left elbow pain    HISTORY OF PRESENT ILLNESS:   Pt is here today for post-operative followup of distal biceps repair.  he is doing well.  We have reviewed patient's findings and discussed plan of care and future treatment options.      Patient has been attending physical therapy at the [facility] location, working with Oren [facility].    He notes that swelling on his posterior forearm has improved   The numbness on his anterior forearm is still slowly improving     He describes a recent soreness in his anterior elbow but also notes increasing his therapy exercises, going to [employer], and playing in a pool recently that likely contributed       DATE OF PROCEDURE: 12/08/2022  PROCEDURE PERFORMED:    1. Open revision repair of Left distal biceps tendon (CPT 77567). (complex, -22 modifier)  2. Open Lysis of adhesions to left elbow  3. Removal of hardware left elbow    DATE OF PROCEDURE: 11/01/2022  SURGEON: Jossy Samayoa M.D.   PROCEDURE PERFORMED:    1. Open repair of Left distal biceps tendon (CPT 45962).       Review of Systems   Constitution: Negative. Negative for chills, fever and night sweats.   HENT: Negative for congestion and headaches.    Eyes: Negative for blurred vision, left vision loss and right vision loss.   Cardiovascular: Negative for chest pain and syncope.   Respiratory: Negative for cough and shortness of breath.    Endocrine: Negative for polydipsia, polyphagia and polyuria.   Hematologic/Lymphatic: Negative for bleeding problem. Does not bruise/bleed easily.   Skin: Negative for dry skin, itching and rash.   Musculoskeletal: Negative for falls and muscle weakness.   Gastrointestinal: Negative for abdominal pain and bowel incontinence.   Genitourinary: Negative for bladder incontinence and nocturia.   Neurological: Negative for disturbances in coordination, loss of balance and seizures.   Psychiatric/Behavioral: Negative for depression. The patient does not have insomnia.     Allergic/Immunologic: Negative for hives and persistent infections.                           PAST MEDICAL HISTORY:   Past Medical History:   Diagnosis Date    GERD (gastroesophageal reflux disease)     HTN (hypertension), benign 2/10/2023     PAST SURGICAL HISTORY:   Past Surgical History:   Procedure Laterality Date    HAND SURGERY      REPAIR,TENDON,DISTAL PROXIMAL Left 12/8/2022    Procedure: REPAIR,TENDON,DISTAL PROXIMAL;  Surgeon: Jossy Samayoa MD;  Location: [facility];  Service: Orthopedics;  Laterality: Left;  Distal bicep repair    VASECTOMY       FAMILY HISTORY:   Family History   Problem Relation Age of Onset    Colon cancer Neg Hx     Esophageal cancer Neg Hx     Stomach cancer Neg Hx     Rectal cancer Neg Hx      SOCIAL HISTORY:   Social History     Socioeconomic History    Marital status:    Tobacco Use    Smoking status: Never    Smokeless tobacco: Never   Substance and Sexual Activity    Alcohol use: Yes     Comment: occasionally    Drug use: No       MEDICATIONS:   Current Outpatient Medications:     acyclovir (ZOVIRAX) 400 MG tablet, , Disp: , Rfl:     amLODIPine (NORVASC) 10 MG tablet, Take 1 tablet (10 mg total) by mouth once daily., Disp: 90 tablet, Rfl: 3    citalopram (CELEXA) 20 MG tablet, Take 20 mg by mouth once daily., Disp: , Rfl:     esomeprazole magnesium (NEXIUM) 10 mg suspension, Take 10 mg by mouth before breakfast., Disp: , Rfl:     famotidine (PEPCID) 10 MG tablet, Take 10 mg by mouth 2 (two) times daily., Disp: , Rfl:     fluticasone propionate (FLONASE) 50 mcg/actuation nasal spray, 2 sprays by Each Nostril route., Disp: , Rfl:     ibuprofen (ADVIL,MOTRIN) 800 MG tablet, Take 800 mg by mouth 3 (three) times daily., Disp: , Rfl:     losartan (COZAAR) 100 MG tablet, Take 1 tablet (100 mg total) by mouth once daily., Disp: 90 tablet, Rfl: 3    meloxicam (MOBIC) 15 MG tablet, Take 1 tablet (15 mg total) by mouth once daily., Disp: 30 tablet, Rfl: 2    multivitamin (THERAGRAN)  "per tablet, Take 1 tablet by mouth once daily., Disp: , Rfl:     traMADoL (ULTRAM) 50 mg tablet, Take 1-2 tablets ( mg total) by mouth every 6 (six) hours as needed for Pain., Disp: 21 tablet, Rfl: 0    VENTOLIN HFA 90 mcg/actuation inhaler, , Disp: , Rfl:     sumatriptan (IMITREX) 25 MG Tab, Take 2 tablets (50 mg total) by mouth every 6 (six) hours as needed (migraine)., Disp: 15 tablet, Rfl: 0  ALLERGIES: Review of patient's allergies indicates:  No Known Allergies    VITAL SIGNS: BP (!) 150/98   Pulse 88   Ht 5' 6" (1.676 m)   Wt 79.8 kg (176 lb)   BMI 28.41 kg/m²                                                                PHYSICAL EXAMINATION:     Incision sites healed well  No evidence of any erythema, infection or induration  elbow Range of motion flexion: 140, Extension: 0, supination: 60  No effusion  2+ Radial pulses  Mild to moderate bicep atrophy     I made the decision to obtain old records of the patient including previous notes and imaging. I independently reviewed and interpreted the radiographs and/or MRIs today as well as prior imaging.                                                                                 ASSESSMENT:                                                                                                                                               1. Status post above, doing well.                                                                                                                               PLAN:                                                                                                                                                     1. Continue PT, case discussed with physical therapist   2. I have discussed return to activity in detail.  3.  Patient will see us back in 4 weeks.   4.  All questions were answered, surgical technique was reviewed and interpreted, and patient should contact us with any questions or concerns in the interim.          "                                                                                                                           POSTOPERATIVE CARE: Will keep patient in splint for approximately 14 days. Then brace locked at 90 x 14 days.  At that time, will take out and begin dynamic splinting.      Possible staged need for allograft recon discussed due to the shortened tendon with scar.     Keep at  90° x 4 weeks, then increase range of motion (extension) by 10° per week with only forearm rotation allowed with fully elbow flexed X4 weeks.

## 2023-05-16 ENCOUNTER — CLINICAL SUPPORT (OUTPATIENT)
Dept: REHABILITATION | Facility: HOSPITAL | Age: 43
End: 2023-05-16
Payer: COMMERCIAL

## 2023-05-16 DIAGNOSIS — R52 PAIN AGGRAVATED BY LIFTING: ICD-10-CM

## 2023-05-16 DIAGNOSIS — R29.898 WEAKNESS OF LEFT UPPER EXTREMITY: ICD-10-CM

## 2023-05-16 DIAGNOSIS — M25.622 DECREASED RANGE OF MOTION OF LEFT ELBOW: Primary | ICD-10-CM

## 2023-05-16 PROCEDURE — 97110 THERAPEUTIC EXERCISES: CPT

## 2023-05-16 PROCEDURE — 97530 THERAPEUTIC ACTIVITIES: CPT

## 2023-05-16 NOTE — PROGRESS NOTES
Physical Therapy Daily Treatment Note     Name: Felix Issa [facility]  Clinic Number: [id number]    Therapy Diagnosis:   Encounter Diagnoses   Name Primary?    Decreased range of motion of left elbow Yes    Weakness of left upper extremity     Pain aggravated by lifting      Physician: Marcin Viera III, *    Visit Date: 5/16/2023  Physician Orders: PT Eval and Treat  Medical Diagnosis from Referral:   S46.212A (ICD-10-CM) - Rupture of left distal biceps tendon, initial encounter   G89.18 (ICD-10-CM) - Post-operative pain      Evaluation Date: 1/17/2023  Authorization Period Expiration: 12/20/2023  Plan of Care Expiration: 06/01/2023  Visit # / Visits authorized: 11 / 40 (18 total)      Time In: 0920 (Pt arrived late)  Time Out: 1000  Total Billable Time: 40 minutes    Precautions: Standard       DOS: 12/8/2022     PROCEDURE PERFORMED:    1. Open revision repair of Left distal biceps tendon (CPT 28728). (complex, -22 modifier)  2. Open Lysis of adhesions to left elbow  3. Removal of hardware left elbow     POSTOPERATIVE CARE: Will keep patient in splint for approximately 14 days. Then brace locked at 90 x 14 days.  At that time, will take out and begin dynamic splinting.      Possible staged need for allograft recon discussed due to the shortened tendon with scar.     Keep at  90° x 4 weeks, then increase range of motion (extension) by 10° per week with only forearm rotation allowed with fully elbow flexed X4 weeks.    Subjective     Pt reports: The new exercises have helped with some of the tightness.     He was compliant with home exercise program.  Response to previous treatment: Good tolerance to new ROM    Pain: Not verbalized /10  Location: L elbow     Objective     Daily Measurements:     NT      Daily Treatment       Felix received therapeutic exercises to develop strength, endurance, ROM, and flexibility for 352minutes including:  UBE completed for 4/4 min forward & backward to increase ROM, endurance  "and decrease pain to improve tolerance to ADLs and age related activities.   Seated OH Shoulder press 20# (B) 3x10   Bent over T 3# 3x10 ea.      Not performed:   DB Head hold pro/supination 3x15 5#  DB Chest Press 20# 3x15   Eccentric Biceps flexion 3# 3x12   Cable triceps extension 13# 3x15  Bicep curls 15# 3x15 ea.   Flexor/pronator stretch 4x20"  Flexor Pronator foam roll x3 mins  Sagittal plane 90/90 ER IR OTB/BTB 3x12 ea.         Felix received the following manual therapy techniques: were applied for 00 minutes, including:      Felix participated in neuromuscular re-education activities to improve: Coordination, Kinesthetic, Sense, Proprioception, and Motor Control for 00 minutes. The following activities were included:    Not performed:  Supine Dynamic Lat mobility drill 2x15   Prone Lvl 3 LT 3x12x3"  Prone Lvl 3 Mt 3x12x3"         Felix participated in dynamic functional therapeutic activities to improve functional performance for 18  minutes, including:  Walking El carry 30# w/fat  <-> 60 ft 3x  Bent over Row 30# 3x15    Not performed:   Retro Sled Pull 45# <-> 60 ft 3x     Home Exercises and Patient Education Provided     Education provided:   - Reviwed precautions and timelines for progression    Written Home Exercises Provided: yes.  Exercises were reviewed and Felix was able to demonstrate them prior to the end of the session.  Felix demonstrated good  understanding of the education provided.     See EMR under patient instructions for exercises given.     Assessment     Progressed functional training/strengthening without adverse response and and good technique from patient. Anterior forearm tightness is responding well to flexibility focused interventions.       Felix Is progressing well towards his goals.     Pt will continue to benefit from skilled outpatient physical therapy to address the deficits listed in the problem list box on initial evaluation, provide pt/family education and to " maximize pt's level of independence in the home and community environment. Pt prognosis is Good.     Pt's spiritual, cultural and educational needs considered and pt agreeable to plan of care and goals.    Anticipated barriers to physical therapy: None    Goals:  Short Term Goals: 8 weeks  1. Pt will be compliant with HEP 50% of prescribed amount. (Met)  2. The pt to demo improvement in Elbow ROM to full compared to uninvolved side. (Met)  3.  Pt will tolerate being out of brace for 24 hours without pain (Met)  4. Pt will improve FOTO score to meet or exceed MCID. (Met)     Long Term Goals: 26 weeks   Pt will be compliant with % of prescribed amount.   Pt will demo  strength and isometric elbow strength within 90% LSI measured via HHD.   Pt will report ability to perform a full work day without pain or limitation.   Pt will improve FOTO score to meet or exceed predicted outcome.   The pt will report full participation in ADLs and IADLs without restrictions related to L elbow.     Plan     Outpatient Physical Therapy 2 times weekly for 26 weeks to include the following interventions: Electrical Stimulation IFC/TENS, Manual Therapy, Moist Heat/ Ice, Neuromuscular Re-ed, Patient Education, Self Care, Therapeutic Activities, Therapeutic Exercise, and Dry Needling .     Oren Holcomb, PT , DPT, SCS

## 2023-05-23 ENCOUNTER — CLINICAL SUPPORT (OUTPATIENT)
Dept: REHABILITATION | Facility: HOSPITAL | Age: 43
End: 2023-05-23
Payer: COMMERCIAL

## 2023-05-23 DIAGNOSIS — R29.898 WEAKNESS OF LEFT UPPER EXTREMITY: ICD-10-CM

## 2023-05-23 DIAGNOSIS — R52 PAIN AGGRAVATED BY LIFTING: ICD-10-CM

## 2023-05-23 DIAGNOSIS — M25.622 DECREASED RANGE OF MOTION OF LEFT ELBOW: Primary | ICD-10-CM

## 2023-05-23 PROCEDURE — 97110 THERAPEUTIC EXERCISES: CPT

## 2023-05-23 PROCEDURE — 97112 NEUROMUSCULAR REEDUCATION: CPT

## 2023-05-23 PROCEDURE — 97530 THERAPEUTIC ACTIVITIES: CPT

## 2023-05-23 NOTE — PROGRESS NOTES
"  Physical Therapy Daily Treatment Note     Name: Felix Issa [facility]  Clinic Number: [id number]    Therapy Diagnosis:   Encounter Diagnoses   Name Primary?    Decreased range of motion of left elbow Yes    Weakness of left upper extremity     Pain aggravated by lifting      Physician: Marcin Viera III, *    Visit Date: 5/23/2023  Physician Orders: PT Eval and Treat  Medical Diagnosis from Referral:   S46.212A (ICD-10-CM) - Rupture of left distal biceps tendon, initial encounter   G89.18 (ICD-10-CM) - Post-operative pain      Evaluation Date: 1/17/2023  Authorization Period Expiration: 12/20/2023  Plan of Care Expiration: 06/01/2023  Visit # / Visits authorized: 12 / 40 (19 total)      Time In: 0920 (Pt arrived late)  Time Out: 1019  Total Billable Time: 54 minutes    Precautions: Standard       DOS: 12/8/2022     PROCEDURE PERFORMED:    1. Open revision repair of Left distal biceps tendon (CPT 46302). (complex, -22 modifier)  2. Open Lysis of adhesions to left elbow  3. Removal of hardware left elbow     POSTOPERATIVE CARE: Will keep patient in splint for approximately 14 days. Then brace locked at 90 x 14 days.  At that time, will take out and begin dynamic splinting.      Possible staged need for allograft recon discussed due to the shortened tendon with scar.     Keep at  90° x 4 weeks, then increase range of motion (extension) by 10° per week with only forearm rotation allowed with fully elbow flexed X4 weeks.    Subjective     Pt reports: "My arm feels really good. I was thinking about how good it felt while I was swimming and working last week."    He was compliant with home exercise program.  Response to previous treatment: Good tolerance to new ROM    Pain: Not verbalized /10  Location: L elbow     Objective     Daily Measurements:      Strength: (MIKAL Dynamometer in lbs.) Average 3 trials, Position II:     5/23/2023 5/23/2023    Left Right   Elbow Flexed 89# 85#   Elbow Extended 105# 102#   90 " "Deg Shoulder Flexion   89#   81#        Isometric elbow flexion strength via in line dynamometer (lbs):  R: 77.2  R: 44.5      Daily Treatment       Felix received therapeutic exercises to develop strength, endurance, ROM, and flexibility for 25 minutes including:  UBE completed for 4/4 min forward & backward to increase ROM, endurance and decrease pain to improve tolerance to ADLs and age related activities.   Bent over T 3# 3x10 ea.  Bicep curls 15# 3x15 ea.     Not performed:   DB Head hold pro/supination 3x15 5#  DB Chest Press 20# 3x15   Eccentric Biceps flexion 3# 3x12   Cable triceps extension 13# 3x15  Sagittal plane 90/90 ER IR OTB/BTB 3x12 ea.   Seated OH Shoulder press 20# (B) 3x10         Felix received the following manual therapy techniques: were applied for 00 minutes, including:      Felix participated in neuromuscular re-education activities to improve: Coordination, Kinesthetic, Sense, Proprioception, and Motor Control for 18 minutes. The following activities were included:  Prone Lvl 3 MT 3x12x3" 3#  High to ER OTB 3x12     Not performed:  Supine Dynamic Lat mobility drill 2x15   Prone Lvl 3 LT 3x12x3"    Felix participated in dynamic functional therapeutic activities to improve functional performance for 11  minutes, including:  Bent over Row 30# 3x15    Not performed:   Retro Sled Pull 45# <-> 60 ft 3x   Walking Farmer carry 30# w/fat  <-> 60 ft 3x    Home Exercises and Patient Education Provided     Education provided:   - Reviwed precautions and timelines for progression    Written Home Exercises Provided: yes.  Exercises were reviewed and Felix was able to demonstrate them prior to the end of the session.  Felix demonstrated good  understanding of the education provided.     See EMR under patient instructions for exercises given.     Assessment     Pt presents with no c/o anterior forearm tightness. Objective strength assessment shows excellent  strength and LSI for elbow flexion " strength of 57% which will need to be addressed for optimal outcome. Functional training and resistance based strengthening progressed today and HEP updated.       Felix Is progressing well towards his goals.     Pt will continue to benefit from skilled outpatient physical therapy to address the deficits listed in the problem list box on initial evaluation, provide pt/family education and to maximize pt's level of independence in the home and community environment. Pt prognosis is Good.     Pt's spiritual, cultural and educational needs considered and pt agreeable to plan of care and goals.    Anticipated barriers to physical therapy: None    Goals:  Short Term Goals: 8 weeks  1. Pt will be compliant with HEP 50% of prescribed amount. (Met)  2. The pt to demo improvement in Elbow ROM to full compared to uninvolved side. (Met)  3.  Pt will tolerate being out of brace for 24 hours without pain (Met)  4. Pt will improve FOTO score to meet or exceed MCID. (Met)     Long Term Goals: 26 weeks   Pt will be compliant with % of prescribed amount.   Pt will demo  strength and isometric elbow strength within 80% LSI measured via HHD.   Pt will report ability to perform a full work day without pain or limitation.   Pt will improve FOTO score to meet or exceed predicted outcome.   The pt will report full participation in ADLs and IADLs without restrictions related to L elbow.     Plan     Outpatient Physical Therapy 2 times weekly for 26 weeks to include the following interventions: Electrical Stimulation IFC/TENS, Manual Therapy, Moist Heat/ Ice, Neuromuscular Re-ed, Patient Education, Self Care, Therapeutic Activities, Therapeutic Exercise, and Dry Needling .     Oren Holcomb, PT , DPT, SCS

## 2023-05-30 ENCOUNTER — PATIENT MESSAGE (OUTPATIENT)
Dept: REHABILITATION | Facility: HOSPITAL | Age: 43
End: 2023-05-30

## 2023-06-06 ENCOUNTER — CLINICAL SUPPORT (OUTPATIENT)
Dept: REHABILITATION | Facility: HOSPITAL | Age: 43
End: 2023-06-06
Payer: COMMERCIAL

## 2023-06-06 DIAGNOSIS — M25.622 DECREASED RANGE OF MOTION OF LEFT ELBOW: Primary | ICD-10-CM

## 2023-06-06 DIAGNOSIS — R29.898 WEAKNESS OF LEFT UPPER EXTREMITY: ICD-10-CM

## 2023-06-06 DIAGNOSIS — R52 PAIN AGGRAVATED BY LIFTING: ICD-10-CM

## 2023-06-06 PROCEDURE — 97530 THERAPEUTIC ACTIVITIES: CPT

## 2023-06-06 PROCEDURE — 97110 THERAPEUTIC EXERCISES: CPT

## 2023-06-06 NOTE — PROGRESS NOTES
Physical Therapy Daily Treatment Note     Name: Felix Issa [facility]  Clinic Number: [id number]    Therapy Diagnosis:   Encounter Diagnoses   Name Primary?    Decreased range of motion of left elbow Yes    Weakness of left upper extremity     Pain aggravated by lifting      Physician: Marcin Viera III, *    Visit Date: 6/6/2023  Physician Orders: PT Eval and Treat  Medical Diagnosis from Referral:   S46.212A (ICD-10-CM) - Rupture of left distal biceps tendon, initial encounter   G89.18 (ICD-10-CM) - Post-operative pain      Evaluation Date: 1/17/2023  Authorization Period Expiration: 12/20/2023  Plan of Care Expiration: 06/01/2023  Visit # / Visits authorized: 9 / 40 (20 total)      Time In: 0920 (Pt arrived late)  Time Out: 1005  Total Billable Time: 41 minutes    Precautions: Standard       DOS: 12/8/2022     PROCEDURE PERFORMED:    1. Open revision repair of Left distal biceps tendon (CPT 67658). (complex, -22 modifier)  2. Open Lysis of adhesions to left elbow  3. Removal of hardware left elbow     POSTOPERATIVE CARE: Will keep patient in splint for approximately 14 days. Then brace locked at 90 x 14 days.  At that time, will take out and begin dynamic splinting.      Possible staged need for allograft recon discussed due to the shortened tendon with scar.     Keep at  90° x 4 weeks, then increase range of motion (extension) by 10° per week with only forearm rotation allowed with fully elbow flexed X4 weeks.    Subjective     Pt reports: His elbow feels great. No pain. Continues to feel stronger.     He was compliant with home exercise program.  Response to previous treatment: Good tolerance to new ROM    Pain: Not verbalized /10  Location: L elbow     Objective     Daily Measurements:       5/23/23   Strength: (MIKAL Dynamometer in lbs.) Average 3 trials, Position II:     6/6/2023 6/6/2023    Left Right   Elbow Flexed 89# 85#   Elbow Extended 105# 102#   90 Deg Shoulder Flexion   89#   81#     "    Isometric elbow flexion strength via in line dynamometer (lbs):  R: 77.2  R: 44.5      Daily Treatment       Felix received therapeutic exercises to develop strength, endurance, ROM, and flexibility for 20 minutes including:  UBE completed for 4/4 min forward & backward to increase ROM, endurance and decrease pain to improve tolerance to ADLs and age related activities.   Bicep curls 15# 3x15 ea.   Hammer Curls 20# 3x15     Not performed:   DB Head hold pro/supination 3x15 5#  DB Chest Press 20# 3x15   Eccentric Biceps flexion 3# 3x12   Cable triceps extension 13# 3x15  Sagittal plane 90/90 ER IR OTB/BTB 3x12 ea.   Seated OH Shoulder press 20# (B) 3x10   Bent over T 3# 3x10 ea.        Felix received the following manual therapy techniques: were applied for 00 minutes, including:      Felix participated in neuromuscular re-education activities to improve: Coordination, Kinesthetic, Sense, Proprioception, and Motor Control for 00 minutes. The following activities were included:    Not performed:  Supine Dynamic Lat mobility drill 2x15   Prone Lvl 3 LT 3x12x3"  Prone Lvl 3 MT 3x12x3" 3#  High to ER OTB 3x12     Felix participated in dynamic functional therapeutic activities to improve functional performance for 25  minutes, including:  Bent over Row 30# 3x15  Walking Farmer carry 30# w/fat  <-> 60 ft 4x  OH Shoulder Press (B) 20# 3x12    Not performed:   Retro Sled Pull 45# <-> 60 ft 3x     Home Exercises and Patient Education Provided     Education provided:   - Reviwed precautions and timelines for progression    Written Home Exercises Provided: yes.  Exercises were reviewed and Felix was able to demonstrate them prior to the end of the session.  Felix demonstrated good  understanding of the education provided.     See EMR under patient instructions for exercises given.     Assessment     Continued with emphasis on addressing strength deficit compared to uninvolved side with functional task training. No c/o " pain with pt reported appropriate levels of muscular fatigue.       Felix Is progressing well towards his goals.     Pt will continue to benefit from skilled outpatient physical therapy to address the deficits listed in the problem list box on initial evaluation, provide pt/family education and to maximize pt's level of independence in the home and community environment. Pt prognosis is Good.     Pt's spiritual, cultural and educational needs considered and pt agreeable to plan of care and goals.    Anticipated barriers to physical therapy: None    Goals:  Short Term Goals: 8 weeks  1. Pt will be compliant with HEP 50% of prescribed amount. (Met)  2. The pt to demo improvement in Elbow ROM to full compared to uninvolved side. (Met)  3.  Pt will tolerate being out of brace for 24 hours without pain (Met)  4. Pt will improve FOTO score to meet or exceed MCID. (Met)     Long Term Goals: 26 weeks   Pt will be compliant with % of prescribed amount.   Pt will demo  strength and isometric elbow strength within 80% LSI measured via HHD.   Pt will report ability to perform a full work day without pain or limitation.   Pt will improve FOTO score to meet or exceed predicted outcome.   The pt will report full participation in ADLs and IADLs without restrictions related to L elbow.     Plan     Outpatient Physical Therapy 2 times weekly for 26 weeks to include the following interventions: Electrical Stimulation IFC/TENS, Manual Therapy, Moist Heat/ Ice, Neuromuscular Re-ed, Patient Education, Self Care, Therapeutic Activities, Therapeutic Exercise, and Dry Needling .     Oren Holcomb, PT , DPT, SCS

## 2023-06-13 ENCOUNTER — CLINICAL SUPPORT (OUTPATIENT)
Dept: REHABILITATION | Facility: HOSPITAL | Age: 43
End: 2023-06-13
Payer: COMMERCIAL

## 2023-06-13 DIAGNOSIS — R29.898 WEAKNESS OF LEFT UPPER EXTREMITY: ICD-10-CM

## 2023-06-13 DIAGNOSIS — R52 PAIN AGGRAVATED BY LIFTING: ICD-10-CM

## 2023-06-13 DIAGNOSIS — M25.622 DECREASED RANGE OF MOTION OF LEFT ELBOW: Primary | ICD-10-CM

## 2023-06-13 PROCEDURE — 97530 THERAPEUTIC ACTIVITIES: CPT

## 2023-06-13 PROCEDURE — 97110 THERAPEUTIC EXERCISES: CPT

## 2023-06-13 NOTE — PROGRESS NOTES
Physical Therapy Daily Treatment Note     Name: Felix Issa [facility]  Clinic Number: [id number]    Therapy Diagnosis:   Encounter Diagnoses   Name Primary?    Decreased range of motion of left elbow Yes    Weakness of left upper extremity     Pain aggravated by lifting      Physician: Marcin Viera III, *    Visit Date: 6/13/2023  Physician Orders: PT Eval and Treat  Medical Diagnosis from Referral:   S46.212A (ICD-10-CM) - Rupture of left distal biceps tendon, initial encounter   G89.18 (ICD-10-CM) - Post-operative pain      Evaluation Date: 1/17/2023  Authorization Period Expiration: 12/20/2023  Plan of Care Expiration: 06/01/2023  Visit # / Visits authorized: 10 / 40 (21 total)      Time In: 0911 (Pt arrived late)  Time Out: 1000  Total Billable Time: 49 minutes    Precautions: Standard       DOS: 12/8/2022     PROCEDURE PERFORMED:    1. Open revision repair of Left distal biceps tendon (CPT 05383). (complex, -22 modifier)  2. Open Lysis of adhesions to left elbow  3. Removal of hardware left elbow     POSTOPERATIVE CARE: Will keep patient in splint for approximately 14 days. Then brace locked at 90 x 14 days.  At that time, will take out and begin dynamic splinting.      Possible staged need for allograft recon discussed due to the shortened tendon with scar.     Keep at  90° x 4 weeks, then increase range of motion (extension) by 10° per week with only forearm rotation allowed with fully elbow flexed X4 weeks.    Subjective     Pt reports: He rode bikes in [place] this weekend. Just on flat ground. He has no pain. Just noted some muscular fatigue.     He was compliant with home exercise program.    Pain: Not verbalized /10  Location: L elbow     Objective     Daily Measurements:       5/23/23   Strength: (MIKAL Dynamometer in lbs.) Average 3 trials, Position II:     6/13/2023 6/13/2023    Left Right   Elbow Flexed 89# 85#   Elbow Extended 105# 102#   90 Deg Shoulder Flexion   89#   81#        Isometric  "elbow flexion strength via in line dynamometer (lbs):  R: 77.2  R: 44.5      Daily Treatment       Felix received therapeutic exercises to develop strength, endurance, ROM, and flexibility for 18 minutes including:  UBE completed for 4/4 min forward & backward to increase ROM, endurance and decrease pain to improve tolerance to ADLs and age related activities.   Bicep curls 20# 3x10 ea.     Not performed:   DB Chest Press 20# 3x15   Eccentric Biceps flexion 3# 3x12   Cable triceps extension 13# 3x15  Sagittal plane 90/90 ER IR OTB/BTB 3x12 ea.   Bent over T 3# 3x10 ea.  Hammer Curls 20# 3x15         Felix received the following manual therapy techniques: were applied for 00 minutes, including:      Felix participated in neuromuscular re-education activities to improve: Coordination, Kinesthetic, Sense, Proprioception, and Motor Control for 00 minutes. The following activities were included:    Not performed:  Supine Dynamic Lat mobility drill 2x15   Prone Lvl 3 LT 3x12x3"  Prone Lvl 3 MT 3x12x3" 3#  High to ER OTB 3x12     Felix participated in dynamic functional therapeutic activities to improve functional performance for 31  minutes, including:  Bent over Row 30# 3x15  Walking Farmer carry 30# w/fat  <-> 60 ft 4x  OH Shoulder Press (B) 20# 3x12  Retro Sled Pull 90# <-> 60 ft 3x        Home Exercises and Patient Education Provided     Education provided:   - Reviwed precautions and timelines for progression    Written Home Exercises Provided: yes.  Exercises were reviewed and Felix was able to demonstrate them prior to the end of the session.  Felix demonstrated good  understanding of the education provided.     See EMR under patient instructions for exercises given.     Assessment     Felix continues to tolerate progressive loading well. Progressed resistance/reps with report of muscular fatigue but no pain.     Physical therapy technician utilized during parts of treatment while I maintained line of sight " supervision at all times.       Felix Is progressing well towards his goals.     Pt will continue to benefit from skilled outpatient physical therapy to address the deficits listed in the problem list box on initial evaluation, provide pt/family education and to maximize pt's level of independence in the home and community environment. Pt prognosis is Good.     Pt's spiritual, cultural and educational needs considered and pt agreeable to plan of care and goals.    Anticipated barriers to physical therapy: None    Goals:  Short Term Goals: 8 weeks  1. Pt will be compliant with HEP 50% of prescribed amount. (Met)  2. The pt to demo improvement in Elbow ROM to full compared to uninvolved side. (Met)  3.  Pt will tolerate being out of brace for 24 hours without pain (Met)  4. Pt will improve FOTO score to meet or exceed MCID. (Met)     Long Term Goals: 26 weeks   Pt will be compliant with % of prescribed amount.   Pt will demo  strength and isometric elbow strength within 80% LSI measured via HHD.   Pt will report ability to perform a full work day without pain or limitation.   Pt will improve FOTO score to meet or exceed predicted outcome.   The pt will report full participation in ADLs and IADLs without restrictions related to L elbow.     Plan     Outpatient Physical Therapy 2 times weekly for 26 weeks to include the following interventions: Electrical Stimulation IFC/TENS, Manual Therapy, Moist Heat/ Ice, Neuromuscular Re-ed, Patient Education, Self Care, Therapeutic Activities, Therapeutic Exercise, and Dry Needling .     Oren Holcomb, PT , DPT, SCS

## 2023-06-19 ENCOUNTER — PATIENT MESSAGE (OUTPATIENT)
Dept: REHABILITATION | Facility: HOSPITAL | Age: 43
End: 2023-06-19
Payer: COMMERCIAL

## 2023-06-30 ENCOUNTER — PATIENT MESSAGE (OUTPATIENT)
Dept: REHABILITATION | Facility: HOSPITAL | Age: 43
End: 2023-06-30

## 2023-07-11 ENCOUNTER — CLINICAL SUPPORT (OUTPATIENT)
Dept: REHABILITATION | Facility: HOSPITAL | Age: 43
End: 2023-07-11
Payer: COMMERCIAL

## 2023-07-11 DIAGNOSIS — M25.622 DECREASED RANGE OF MOTION OF LEFT ELBOW: Primary | ICD-10-CM

## 2023-07-11 DIAGNOSIS — R29.898 WEAKNESS OF LEFT UPPER EXTREMITY: ICD-10-CM

## 2023-07-11 DIAGNOSIS — R52 PAIN AGGRAVATED BY LIFTING: ICD-10-CM

## 2023-07-11 PROCEDURE — 97110 THERAPEUTIC EXERCISES: CPT

## 2023-07-11 PROCEDURE — 97530 THERAPEUTIC ACTIVITIES: CPT

## 2023-07-11 NOTE — PROGRESS NOTES
Physical Therapy Daily Treatment Note     Name: Felix Issa [facility]  Clinic Number: [id number]    Therapy Diagnosis:   Encounter Diagnoses   Name Primary?    Decreased range of motion of left elbow Yes    Weakness of left upper extremity     Pain aggravated by lifting      Physician: Marcin Viera III, *    Visit Date: 7/11/2023  Physician Orders: PT Eval and Treat  Medical Diagnosis from Referral:   S46.212A (ICD-10-CM) - Rupture of left distal biceps tendon, initial encounter   G89.18 (ICD-10-CM) - Post-operative pain      Evaluation Date: 1/17/2023  Authorization Period Expiration: 12/20/2023  Plan of Care Expiration: 06/01/2023  Visit # / Visits authorized: 11 / 40 (22 total)      Time In: 0907  Time Out: 1000  Total Billable Time: 53 minutes    Precautions: Standard       DOS: 12/8/2022     PROCEDURE PERFORMED:    1. Open revision repair of Left distal biceps tendon (CPT 31559). (complex, -22 modifier)  2. Open Lysis of adhesions to left elbow  3. Removal of hardware left elbow     POSTOPERATIVE CARE: Will keep patient in splint for approximately 14 days. Then brace locked at 90 x 14 days.  At that time, will take out and begin dynamic splinting.      Possible staged need for allograft recon discussed due to the shortened tendon with scar.     Keep at  90° x 4 weeks, then increase range of motion (extension) by 10° per week with only forearm rotation allowed with fully elbow flexed X4 weeks.    Subjective     Pt reports: He feels strong. He occasionally notes some soreness.      He was compliant with home exercise program.    Pain: Not verbalized /10  Location: L elbow     Objective     Daily Measurements:       5/23/23   Strength: (MIKAL Dynamometer in lbs.) Average 3 trials, Position II:     7/11/2023 7/11/2023    Left Right   Elbow Flexed 89# 85#   Elbow Extended 105# 102#   90 Deg Shoulder Flexion   89#   81#        Isometric elbow flexion strength via in line dynamometer (lbs):  R: 77.2  R:  "44.5      Daily Treatment       Felix received therapeutic exercises to develop strength, endurance, ROM, and flexibility for 25 minutes including:  UBE completed for 4/4 min forward & backward to increase ROM, endurance and decrease pain to improve tolerance to ADLs and age related activities.   Bicep curls 20# 3x10 ea.   Cable triceps extension 13# 3x15    Not performed:   DB Chest Press 20# 3x15   Sagittal plane 90/90 ER IR OTB/BTB 3x12 ea.   Bent over T 3# 3x10 ea.  Hammer Curls 20# 3x15         Felix received the following manual therapy techniques: were applied for 00 minutes, including:      Felix participated in neuromuscular re-education activities to improve: Coordination, Kinesthetic, Sense, Proprioception, and Motor Control for 00 minutes. The following activities were included:    Not performed:  Supine Dynamic Lat mobility drill 2x15   Prone Lvl 3 LT 3x12x3"  Prone Lvl 3 MT 3x12x3" 3#  High to ER OTB 3x12     Felix participated in dynamic functional therapeutic activities to improve functional performance for 28  minutes, including:  Bent over Row 30# 3x15  Walking Farmer carry 30# w/fat  <-> 60 ft 4x  OH Shoulder Press (B) 20# 3x12    Not performed:  Retro Sled Pull 90# <-> 60 ft 3x        Home Exercises and Patient Education Provided     Education provided:   - Reviwed precautions and timelines for progression    Written Home Exercises Provided: yes.  Exercises were reviewed and Felix was able to demonstrate them prior to the end of the session.  Felix demonstrated good  understanding of the education provided.     See EMR under patient instructions for exercises given.     Assessment     Felix continues to tolerate progressive loading well. Progressed resistance/reps with report of muscular fatigue but no pain. Still needs continued strengthening and was instructed to perform resistance training to failure to achieve goal of improving strength.    Physical therapy technician utilized during " parts of treatment while I maintained line of sight supervision at all times.       Felix Is progressing well towards his goals.     Pt will continue to benefit from skilled outpatient physical therapy to address the deficits listed in the problem list box on initial evaluation, provide pt/family education and to maximize pt's level of independence in the home and community environment. Pt prognosis is Good.     Pt's spiritual, cultural and educational needs considered and pt agreeable to plan of care and goals.    Anticipated barriers to physical therapy: None    Goals:  Short Term Goals: 8 weeks  1. Pt will be compliant with HEP 50% of prescribed amount. (Met)  2. The pt to demo improvement in Elbow ROM to full compared to uninvolved side. (Met)  3.  Pt will tolerate being out of brace for 24 hours without pain (Met)  4. Pt will improve FOTO score to meet or exceed MCID. (Met)     Long Term Goals: 26 weeks   Pt will be compliant with % of prescribed amount.   Pt will demo  strength and isometric elbow strength within 80% LSI measured via HHD.   Pt will report ability to perform a full work day without pain or limitation.   Pt will improve FOTO score to meet or exceed predicted outcome.   The pt will report full participation in ADLs and IADLs without restrictions related to L elbow.     Plan     Outpatient Physical Therapy 2 times weekly for 26 weeks to include the following interventions: Electrical Stimulation IFC/TENS, Manual Therapy, Moist Heat/ Ice, Neuromuscular Re-ed, Patient Education, Self Care, Therapeutic Activities, Therapeutic Exercise, and Dry Needling .     Oren Holcomb, PT , DPT, SCS

## 2023-07-12 ENCOUNTER — OFFICE VISIT (OUTPATIENT)
Dept: PRIMARY CARE CLINIC | Facility: CLINIC | Age: 43
End: 2023-07-12
Payer: COMMERCIAL

## 2023-07-12 VITALS
HEIGHT: 66 IN | SYSTOLIC BLOOD PRESSURE: 134 MMHG | WEIGHT: 181 LBS | HEART RATE: 102 BPM | DIASTOLIC BLOOD PRESSURE: 86 MMHG | OXYGEN SATURATION: 96 % | BODY MASS INDEX: 29.09 KG/M2

## 2023-07-12 DIAGNOSIS — I10 HTN (HYPERTENSION), BENIGN: Primary | ICD-10-CM

## 2023-07-12 DIAGNOSIS — R53.83 FATIGUE, UNSPECIFIED TYPE: ICD-10-CM

## 2023-07-12 DIAGNOSIS — G43.909 MIGRAINE WITHOUT STATUS MIGRAINOSUS, NOT INTRACTABLE, UNSPECIFIED MIGRAINE TYPE: ICD-10-CM

## 2023-07-12 PROCEDURE — 1160F RVW MEDS BY RX/DR IN RCRD: CPT | Mod: CPTII,S$GLB,, | Performed by: STUDENT IN AN ORGANIZED HEALTH CARE EDUCATION/TRAINING PROGRAM

## 2023-07-12 PROCEDURE — 99214 PR OFFICE/OUTPT VISIT, EST, LEVL IV, 30-39 MIN: ICD-10-PCS | Mod: S$GLB,,, | Performed by: STUDENT IN AN ORGANIZED HEALTH CARE EDUCATION/TRAINING PROGRAM

## 2023-07-12 PROCEDURE — 3079F PR MOST RECENT DIASTOLIC BLOOD PRESSURE 80-89 MM HG: ICD-10-PCS | Mod: CPTII,S$GLB,, | Performed by: STUDENT IN AN ORGANIZED HEALTH CARE EDUCATION/TRAINING PROGRAM

## 2023-07-12 PROCEDURE — 3079F DIAST BP 80-89 MM HG: CPT | Mod: CPTII,S$GLB,, | Performed by: STUDENT IN AN ORGANIZED HEALTH CARE EDUCATION/TRAINING PROGRAM

## 2023-07-12 PROCEDURE — 3075F SYST BP GE 130 - 139MM HG: CPT | Mod: CPTII,S$GLB,, | Performed by: STUDENT IN AN ORGANIZED HEALTH CARE EDUCATION/TRAINING PROGRAM

## 2023-07-12 PROCEDURE — 3075F PR MOST RECENT SYSTOLIC BLOOD PRESS GE 130-139MM HG: ICD-10-PCS | Mod: CPTII,S$GLB,, | Performed by: STUDENT IN AN ORGANIZED HEALTH CARE EDUCATION/TRAINING PROGRAM

## 2023-07-12 PROCEDURE — 4010F PR ACE/ARB THEARPY RXD/TAKEN: ICD-10-PCS | Mod: CPTII,S$GLB,, | Performed by: STUDENT IN AN ORGANIZED HEALTH CARE EDUCATION/TRAINING PROGRAM

## 2023-07-12 PROCEDURE — 99214 OFFICE O/P EST MOD 30 MIN: CPT | Mod: S$GLB,,, | Performed by: STUDENT IN AN ORGANIZED HEALTH CARE EDUCATION/TRAINING PROGRAM

## 2023-07-12 PROCEDURE — 99999 PR PBB SHADOW E&M-EST. PATIENT-LVL IV: ICD-10-PCS | Mod: PBBFAC,,, | Performed by: STUDENT IN AN ORGANIZED HEALTH CARE EDUCATION/TRAINING PROGRAM

## 2023-07-12 PROCEDURE — 4010F ACE/ARB THERAPY RXD/TAKEN: CPT | Mod: CPTII,S$GLB,, | Performed by: STUDENT IN AN ORGANIZED HEALTH CARE EDUCATION/TRAINING PROGRAM

## 2023-07-12 PROCEDURE — 1160F PR REVIEW ALL MEDS BY PRESCRIBER/CLIN PHARMACIST DOCUMENTED: ICD-10-PCS | Mod: CPTII,S$GLB,, | Performed by: STUDENT IN AN ORGANIZED HEALTH CARE EDUCATION/TRAINING PROGRAM

## 2023-07-12 PROCEDURE — 1159F PR MEDICATION LIST DOCUMENTED IN MEDICAL RECORD: ICD-10-PCS | Mod: CPTII,S$GLB,, | Performed by: STUDENT IN AN ORGANIZED HEALTH CARE EDUCATION/TRAINING PROGRAM

## 2023-07-12 PROCEDURE — 3008F PR BODY MASS INDEX (BMI) DOCUMENTED: ICD-10-PCS | Mod: CPTII,S$GLB,, | Performed by: STUDENT IN AN ORGANIZED HEALTH CARE EDUCATION/TRAINING PROGRAM

## 2023-07-12 PROCEDURE — 1159F MED LIST DOCD IN RCRD: CPT | Mod: CPTII,S$GLB,, | Performed by: STUDENT IN AN ORGANIZED HEALTH CARE EDUCATION/TRAINING PROGRAM

## 2023-07-12 PROCEDURE — 99999 PR PBB SHADOW E&M-EST. PATIENT-LVL IV: CPT | Mod: PBBFAC,,, | Performed by: STUDENT IN AN ORGANIZED HEALTH CARE EDUCATION/TRAINING PROGRAM

## 2023-07-12 PROCEDURE — 3008F BODY MASS INDEX DOCD: CPT | Mod: CPTII,S$GLB,, | Performed by: STUDENT IN AN ORGANIZED HEALTH CARE EDUCATION/TRAINING PROGRAM

## 2023-07-12 NOTE — PROGRESS NOTES
Subjective:       Patient ID: Felix Stern is a 42 y.o. male with hypertension, mild intermittent asthma, chronic seasonal allergies, migraines, and GERD.  Chief Complaint: Fatigue    Presents today to discuss fatigue. Has been active for about the last 3 months. Over this time, + increase in stressors. Increased alcohol intake. Increased fatty foods. Decreased water intake.    HTN -   Losartan 100 mg daily and amlodipine 10mg daily  Patient not currently checking BP at home.  + occasional headaches  Denies  vision changes, CP, palpitations, or other concerning symptoms.   No results found for: MICALBCREAT  BP Readings from Last 3 Encounters:   07/19/23 (!) 136/97   07/12/23 134/86   05/08/23 (!) 150/98       Migraine headaches:  <10 headache days/month  Frontal headache  Nsaids/Fioricet prn for migraines  No aura  +nausea, no vomiting    Review of Systems   Constitutional:  Positive for fatigue. Negative for fever.   Respiratory:  Negative for shortness of breath.    Cardiovascular:  Negative for chest pain.   Genitourinary:  Negative for dysuria, flank pain and urgency.   Musculoskeletal:  Negative for back pain, gait problem and leg pain.   Integumentary:  Negative for rash.   Neurological:  Positive for headaches. Negative for weakness and numbness.            Objective:        Physical Exam  HENT:      Head: Normocephalic and atraumatic.      Nose: Nose normal.      Mouth/Throat:      Mouth: Mucous membranes are moist.      Pharynx: Oropharynx is clear.   Eyes:      Extraocular Movements: Extraocular movements intact.      Conjunctiva/sclera: Conjunctivae normal.      Pupils: Pupils are equal, round, and reactive to light.   Cardiovascular:      Rate and Rhythm: Normal rate and regular rhythm.   Pulmonary:      Effort: Pulmonary effort is normal.   Musculoskeletal:         General: No swelling. Normal range of motion.      Cervical back: Normal range of motion.      Right lower leg: No edema.       Left lower leg: No edema.   Skin:     General: Skin is warm.      Findings: No lesion or rash.   Neurological:      General: No focal deficit present.      Mental Status: He is alert and oriented to person, place, and time.      Motor: No weakness.       Assessment:         Problem List Items Addressed This Visit          Neuro    Migraine without status migrainosus, not intractable       Cardiac/Vascular    HTN (hypertension), benign - Primary     Other Visit Diagnoses       Fatigue, unspecified type                    Plan:         1. HTN (hypertension), benign  Overview:  Losartan 100mg and amlodipine 10 mg daily.  Stable on medications, continue regimen        2. Fatigue, unspecified type   Counseled obtaining 7-8 hours of nightly sleep, adequate hydration with water, well balanced diet. And 150+ minutes of exercise/week.  Decrease alcohol intake.    3. Migraine without status migrainosus, not intractable, unspecified migraine type  Overview:  Tylenol prn headaches  Stable on medications, continue regimen        Vilma Muñoz MD

## 2023-07-19 ENCOUNTER — OFFICE VISIT (OUTPATIENT)
Dept: SPORTS MEDICINE | Facility: CLINIC | Age: 43
End: 2023-07-19
Payer: COMMERCIAL

## 2023-07-19 VITALS
HEIGHT: 66 IN | DIASTOLIC BLOOD PRESSURE: 97 MMHG | SYSTOLIC BLOOD PRESSURE: 136 MMHG | WEIGHT: 178.44 LBS | BODY MASS INDEX: 28.68 KG/M2 | HEART RATE: 84 BPM

## 2023-07-19 DIAGNOSIS — M25.522 LEFT ELBOW PAIN: ICD-10-CM

## 2023-07-19 DIAGNOSIS — Z09 S/P ORTHOPEDIC SURGERY, FOLLOW-UP EXAM: Primary | ICD-10-CM

## 2023-07-19 PROCEDURE — 4010F PR ACE/ARB THEARPY RXD/TAKEN: ICD-10-PCS | Mod: CPTII,S$GLB,, | Performed by: ORTHOPAEDIC SURGERY

## 2023-07-19 PROCEDURE — 99214 PR OFFICE/OUTPT VISIT, EST, LEVL IV, 30-39 MIN: ICD-10-PCS | Mod: S$GLB,,, | Performed by: ORTHOPAEDIC SURGERY

## 2023-07-19 PROCEDURE — 99999 PR PBB SHADOW E&M-EST. PATIENT-LVL III: CPT | Mod: PBBFAC,,, | Performed by: ORTHOPAEDIC SURGERY

## 2023-07-19 PROCEDURE — 3008F PR BODY MASS INDEX (BMI) DOCUMENTED: ICD-10-PCS | Mod: CPTII,S$GLB,, | Performed by: ORTHOPAEDIC SURGERY

## 2023-07-19 PROCEDURE — 3075F SYST BP GE 130 - 139MM HG: CPT | Mod: CPTII,S$GLB,, | Performed by: ORTHOPAEDIC SURGERY

## 2023-07-19 PROCEDURE — 99999 PR PBB SHADOW E&M-EST. PATIENT-LVL III: ICD-10-PCS | Mod: PBBFAC,,, | Performed by: ORTHOPAEDIC SURGERY

## 2023-07-19 PROCEDURE — 3080F DIAST BP >= 90 MM HG: CPT | Mod: CPTII,S$GLB,, | Performed by: ORTHOPAEDIC SURGERY

## 2023-07-19 PROCEDURE — 3008F BODY MASS INDEX DOCD: CPT | Mod: CPTII,S$GLB,, | Performed by: ORTHOPAEDIC SURGERY

## 2023-07-19 PROCEDURE — 4010F ACE/ARB THERAPY RXD/TAKEN: CPT | Mod: CPTII,S$GLB,, | Performed by: ORTHOPAEDIC SURGERY

## 2023-07-19 PROCEDURE — 99214 OFFICE O/P EST MOD 30 MIN: CPT | Mod: S$GLB,,, | Performed by: ORTHOPAEDIC SURGERY

## 2023-07-19 PROCEDURE — 1159F MED LIST DOCD IN RCRD: CPT | Mod: CPTII,S$GLB,, | Performed by: ORTHOPAEDIC SURGERY

## 2023-07-19 PROCEDURE — 3075F PR MOST RECENT SYSTOLIC BLOOD PRESS GE 130-139MM HG: ICD-10-PCS | Mod: CPTII,S$GLB,, | Performed by: ORTHOPAEDIC SURGERY

## 2023-07-19 PROCEDURE — 1159F PR MEDICATION LIST DOCUMENTED IN MEDICAL RECORD: ICD-10-PCS | Mod: CPTII,S$GLB,, | Performed by: ORTHOPAEDIC SURGERY

## 2023-07-19 PROCEDURE — 3080F PR MOST RECENT DIASTOLIC BLOOD PRESSURE >= 90 MM HG: ICD-10-PCS | Mod: CPTII,S$GLB,, | Performed by: ORTHOPAEDIC SURGERY

## 2023-07-19 NOTE — PROGRESS NOTES
Chief Complaint:  left elbow pain    HISTORY OF PRESENT ILLNESS:   Pt is here today for post-operative followup of distal biceps repair.  he is doing well.  We have reviewed patient's findings and discussed plan of care and future treatment options.      Early June he notes riding a bike in [place], swimming, vacation, riding a zip line into the water   He notes soreness in his arm and he was worried because he was having pain when flexing his bicep muscle   He was nervous that he reinjured his bicep tendon   He saw Oren and did some strength testing, etc. and Oren recommended continuing therapy      His soreness has since improved     The numbness on his anterior forearm is still slowly improving     Patient has been attending physical therapy at the [facility] location, working with Oren [facility].      DATE OF PROCEDURE: 12/08/2022  PROCEDURE PERFORMED:    1. Open revision repair of Left distal biceps tendon (CPT 82164). (complex, -22 modifier)  2. Open Lysis of adhesions to left elbow  3. Removal of hardware left elbow    DATE OF PROCEDURE: 11/01/2022  SURGEON: Jossy Samayoa M.D.   PROCEDURE PERFORMED:    1. Open repair of Left distal biceps tendon (CPT 65269).       Review of Systems   Constitution: Negative. Negative for chills, fever and night sweats.   HENT: Negative for congestion and headaches.    Eyes: Negative for blurred vision, left vision loss and right vision loss.   Cardiovascular: Negative for chest pain and syncope.   Respiratory: Negative for cough and shortness of breath.    Endocrine: Negative for polydipsia, polyphagia and polyuria.   Hematologic/Lymphatic: Negative for bleeding problem. Does not bruise/bleed easily.   Skin: Negative for dry skin, itching and rash.   Musculoskeletal: Negative for falls and muscle weakness.   Gastrointestinal: Negative for abdominal pain and bowel incontinence.   Genitourinary: Negative for bladder incontinence and nocturia.   Neurological: Negative for  disturbances in coordination, loss of balance and seizures.   Psychiatric/Behavioral: Negative for depression. The patient does not have insomnia.    Allergic/Immunologic: Negative for hives and persistent infections.                           PAST MEDICAL HISTORY:   Past Medical History:   Diagnosis Date    GERD (gastroesophageal reflux disease)     HTN (hypertension), benign 2/10/2023     PAST SURGICAL HISTORY:   Past Surgical History:   Procedure Laterality Date    HAND SURGERY      REPAIR,TENDON,DISTAL PROXIMAL Left 12/8/2022    Procedure: REPAIR,TENDON,DISTAL PROXIMAL;  Surgeon: Jossy Samayoa MD;  Location: [facility];  Service: Orthopedics;  Laterality: Left;  Distal bicep repair    VASECTOMY       FAMILY HISTORY:   Family History   Problem Relation Age of Onset    Colon cancer Neg Hx     Esophageal cancer Neg Hx     Stomach cancer Neg Hx     Rectal cancer Neg Hx      SOCIAL HISTORY:   Social History     Socioeconomic History    Marital status:    Tobacco Use    Smoking status: Never    Smokeless tobacco: Never   Substance and Sexual Activity    Alcohol use: Yes     Comment: occasionally    Drug use: No       MEDICATIONS:   Current Outpatient Medications:     acyclovir (ZOVIRAX) 400 MG tablet, , Disp: , Rfl:     amLODIPine (NORVASC) 10 MG tablet, Take 1 tablet (10 mg total) by mouth once daily., Disp: 90 tablet, Rfl: 3    citalopram (CELEXA) 20 MG tablet, Take 20 mg by mouth once daily., Disp: , Rfl:     esomeprazole magnesium (NEXIUM) 10 mg suspension, Take 10 mg by mouth before breakfast., Disp: , Rfl:     famotidine (PEPCID) 10 MG tablet, Take 10 mg by mouth 2 (two) times daily., Disp: , Rfl:     fluticasone propionate (FLONASE) 50 mcg/actuation nasal spray, 2 sprays by Each Nostril route., Disp: , Rfl:     ibuprofen (ADVIL,MOTRIN) 800 MG tablet, Take 800 mg by mouth 3 (three) times daily., Disp: , Rfl:     losartan (COZAAR) 100 MG tablet, Take 1 tablet (100 mg total) by mouth once daily., Disp: 90  "tablet, Rfl: 3    meloxicam (MOBIC) 15 MG tablet, Take 1 tablet (15 mg total) by mouth once daily. (Patient not taking: Reported on 7/12/2023), Disp: 30 tablet, Rfl: 2    multivitamin (THERAGRAN) per tablet, Take 1 tablet by mouth once daily., Disp: , Rfl:     sumatriptan (IMITREX) 25 MG Tab, Take 2 tablets (50 mg total) by mouth every 6 (six) hours as needed (migraine)., Disp: 15 tablet, Rfl: 0    traMADoL (ULTRAM) 50 mg tablet, Take 1-2 tablets ( mg total) by mouth every 6 (six) hours as needed for Pain. (Patient not taking: Reported on 7/12/2023), Disp: 21 tablet, Rfl: 0    VENTOLIN HFA 90 mcg/actuation inhaler, , Disp: , Rfl:   ALLERGIES: Review of patient's allergies indicates:  No Known Allergies    VITAL SIGNS: BP (!) 136/97   Pulse 84   Ht 5' 6" (1.676 m)   Wt 81 kg (178 lb 7.4 oz)   BMI 28.80 kg/m²                                                                PHYSICAL EXAMINATION:     Incision sites healed well  No evidence of any erythema, infection or induration  elbow Range of motion flexion: 140, Extension: 0, supination: 60  No effusion  2+ Radial pulses  Mild to moderate bicep atrophy     Distal bicep tendin is intact     I made the decision to obtain old records of the patient including previous notes and imaging. I independently reviewed and interpreted the radiographs and/or MRIs today as well as prior imaging.                                                                                 ASSESSMENT:                                                                                                                                               1. Status post above, doing well.                                                                                                                               PLAN:                                                                                                                                                     1. Continue PT, case discussed with " physical therapist, ok to transition to HEP per therapist   2. I have discussed return to activity in detail.  3.  Patient will see us back in 6 weeks.   4.  All questions were answered, surgical technique was reviewed and interpreted, and patient should contact us with any questions or concerns in the interim.                                                                                                                                    POSTOPERATIVE CARE: Will keep patient in splint for approximately 14 days. Then brace locked at 90 x 14 days.  At that time, will take out and begin dynamic splinting.      Possible staged need for allograft recon discussed due to the shortened tendon with scar.     Keep at  90° x 4 weeks, then increase range of motion (extension) by 10° per week with only forearm rotation allowed with fully elbow flexed X4 weeks.

## 2023-08-06 RX ORDER — SUMATRIPTAN SUCCINATE 25 MG/1
50 TABLET ORAL EVERY 6 HOURS PRN
Qty: 15 TABLET | Refills: 0 | Status: SHIPPED | OUTPATIENT
Start: 2023-08-06 | End: 2023-09-05

## 2024-01-04 ENCOUNTER — OFFICE VISIT (OUTPATIENT)
Dept: PRIMARY CARE CLINIC | Facility: CLINIC | Age: 44
End: 2024-01-04
Payer: COMMERCIAL

## 2024-01-04 ENCOUNTER — PATIENT MESSAGE (OUTPATIENT)
Dept: ADMINISTRATIVE | Facility: HOSPITAL | Age: 44
End: 2024-01-04
Payer: COMMERCIAL

## 2024-01-04 VITALS
SYSTOLIC BLOOD PRESSURE: 130 MMHG | DIASTOLIC BLOOD PRESSURE: 88 MMHG | HEART RATE: 100 BPM | WEIGHT: 173.5 LBS | BODY MASS INDEX: 28 KG/M2 | OXYGEN SATURATION: 96 %

## 2024-01-04 DIAGNOSIS — R53.83 FATIGUE, UNSPECIFIED TYPE: ICD-10-CM

## 2024-01-04 DIAGNOSIS — I10 HTN (HYPERTENSION), BENIGN: Primary | ICD-10-CM

## 2024-01-04 DIAGNOSIS — B00.9 HSV INFECTION: ICD-10-CM

## 2024-01-04 DIAGNOSIS — F32.4 MAJOR DEPRESSIVE DISORDER WITH SINGLE EPISODE, IN PARTIAL REMISSION: ICD-10-CM

## 2024-01-04 DIAGNOSIS — E66.3 OVERWEIGHT (BMI 25.0-29.9): ICD-10-CM

## 2024-01-04 PROCEDURE — 3079F DIAST BP 80-89 MM HG: CPT | Mod: CPTII,S$GLB,, | Performed by: STUDENT IN AN ORGANIZED HEALTH CARE EDUCATION/TRAINING PROGRAM

## 2024-01-04 PROCEDURE — 99214 OFFICE O/P EST MOD 30 MIN: CPT | Mod: S$GLB,,, | Performed by: STUDENT IN AN ORGANIZED HEALTH CARE EDUCATION/TRAINING PROGRAM

## 2024-01-04 PROCEDURE — 1160F RVW MEDS BY RX/DR IN RCRD: CPT | Mod: CPTII,S$GLB,, | Performed by: STUDENT IN AN ORGANIZED HEALTH CARE EDUCATION/TRAINING PROGRAM

## 2024-01-04 PROCEDURE — 3075F SYST BP GE 130 - 139MM HG: CPT | Mod: CPTII,S$GLB,, | Performed by: STUDENT IN AN ORGANIZED HEALTH CARE EDUCATION/TRAINING PROGRAM

## 2024-01-04 PROCEDURE — 3008F BODY MASS INDEX DOCD: CPT | Mod: CPTII,S$GLB,, | Performed by: STUDENT IN AN ORGANIZED HEALTH CARE EDUCATION/TRAINING PROGRAM

## 2024-01-04 PROCEDURE — 3044F HG A1C LEVEL LT 7.0%: CPT | Mod: CPTII,S$GLB,, | Performed by: STUDENT IN AN ORGANIZED HEALTH CARE EDUCATION/TRAINING PROGRAM

## 2024-01-04 PROCEDURE — 1159F MED LIST DOCD IN RCRD: CPT | Mod: CPTII,S$GLB,, | Performed by: STUDENT IN AN ORGANIZED HEALTH CARE EDUCATION/TRAINING PROGRAM

## 2024-01-04 PROCEDURE — 99999 PR PBB SHADOW E&M-EST. PATIENT-LVL IV: CPT | Mod: PBBFAC,,, | Performed by: STUDENT IN AN ORGANIZED HEALTH CARE EDUCATION/TRAINING PROGRAM

## 2024-01-04 RX ORDER — ACYCLOVIR 400 MG/1
400 TABLET ORAL DAILY
Qty: 90 TABLET | Refills: 1 | Status: SHIPPED | OUTPATIENT
Start: 2024-01-04 | End: 2024-07-02

## 2024-01-04 NOTE — PROGRESS NOTES
"Subjective:       Patient ID: Felix Stern is a 43 y.o. male with hypertension, mild intermittent asthma, chronic seasonal allergies, migraines, and GERD.    Chief Complaint: Fatigue    Presents today to discuss ongoing fatigue. Has been active for >6 months but had recently worsened despite getting full night sleep. Over this time, + increase in stressors.  At last appointment with me, had endorsed increased alcohol intake.  But he has decreased intake since.  Has also improved water intake.  Since his last appointment with me, he has started on Celexa for his HALI/MDD by psychiatrist.  He is unsure he is seeing improvement.  Continues to endorse decreased motivation, especially in the morning. No SI/HI/AVH.  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  Finds less enjoyment in hobbies he previously enjoyed like fishing.    HTN -   Losartan 100 mg daily and amlodipine 10mg daily  Denies  vision changes, CP, palpitations, or other concerning symptoms.   No results found for: "MICALBCREAT"  BP Readings from Last 3 Encounters:   01/04/24 130/88   07/19/23 (!) 136/97   07/12/23 134/86     Hx HSV:  Due for acyclovir refill    Overweight: BMI is 28 in clinic today.  Finds decreased motivation to exercise currently.    Review of Systems   Constitutional:  Positive for fatigue. Negative for fever.   Respiratory:  Negative for shortness of breath.    Cardiovascular:  Negative for chest pain.   Genitourinary:  Negative for dysuria, flank pain and urgency.   Musculoskeletal:  Negative for back pain, gait problem and leg pain.   Integumentary:  Negative for rash.   Neurological:  Negative for weakness, numbness and headaches.              Objective:        Physical Exam  HENT:      Head: Normocephalic and atraumatic.      Nose: Nose normal.      Mouth/Throat:      Mouth: Mucous membranes are moist.      Pharynx: Oropharynx is clear. "   Eyes:      Extraocular Movements: Extraocular movements intact.      Conjunctiva/sclera: Conjunctivae normal.      Pupils: Pupils are equal, round, and reactive to light.   Cardiovascular:      Rate and Rhythm: Normal rate and regular rhythm.   Pulmonary:      Effort: Pulmonary effort is normal.   Musculoskeletal:         General: No swelling. Normal range of motion.      Cervical back: Normal range of motion.      Right lower leg: No edema.      Left lower leg: No edema.   Skin:     General: Skin is warm.      Findings: No lesion or rash.   Neurological:      General: No focal deficit present.      Mental Status: He is alert and oriented to person, place, and time.      Motor: No weakness.         Assessment:         Problem List Items Addressed This Visit          Psychiatric    Major depressive disorder with single episode, in partial remission       Cardiac/Vascular    HTN (hypertension), benign - Primary     Other Visit Diagnoses       Fatigue, unspecified type        Relevant Orders    Ambulatory referral/consult to Sleep Disorders    Hemoglobin A1C (Completed)    TSH (Completed)    Comprehensive Metabolic Panel (Completed)    CBC Auto Differential (Completed)    Magnesium (Completed)    Ferritin (Completed)    Iron and TIBC (Completed)    Vitamin B6    Vitamin B1    Folate (Completed)    Vitamin B12 (Completed)    Vitamin D (Completed)    TESTOSTERONE (Completed)    Testosterone, free    Overweight (BMI 25.0-29.9)        Relevant Orders    Ambulatory referral/consult to Sleep Disorders    Hemoglobin A1C (Completed)    Lipid Panel (Completed)    HSV infection        Relevant Medications    acyclovir (ZOVIRAX) 400 MG tablet                  Plan:         1. HTN (hypertension), benign  Overview:  Losartan 100mg and amlodipine 10 mg daily.  Stable on medications, continue regimen        2. Fatigue, unspecified type  -     Ambulatory referral/consult to Sleep Disorders; Future; Expected date: 01/11/2024  -      Hemoglobin A1C; Future; Expected date: 01/04/2024  -     TSH; Future; Expected date: 01/04/2024  -     Comprehensive Metabolic Panel; Future; Expected date: 01/04/2024  -     CBC Auto Differential; Future; Expected date: 01/04/2024  -     Magnesium; Future; Expected date: 01/04/2024  -     Ferritin; Future; Expected date: 01/04/2024  -     Iron and TIBC; Future; Expected date: 01/04/2024  -     Vitamin B6; Future; Expected date: 01/04/2024  -     Vitamin B1; Future; Expected date: 01/04/2024  -     Folate; Future; Expected date: 01/04/2024  -     Vitamin B12; Future; Expected date: 01/04/2024  -     Vitamin D; Future; Expected date: 01/04/2024  -     TESTOSTERONE; Future; Expected date: 01/04/2024  -     Testosterone, free; Future; Expected date: 01/04/2024  Follow up lab work. Counseled obtaining 7-8 hours of nightly sleep, adequate hydration with water, well balanced diet. And 150+ minutes of exercise/week.  Referral to Sleep disorders Clinic placed    3. Overweight (BMI 25.0-29.9)  -     Ambulatory referral/consult to Sleep Disorders; Future; Expected date: 01/11/2024  -     Hemoglobin A1C; Future; Expected date: 01/04/2024  -     Lipid Panel; Future; Expected date: 01/04/2024    4. HSV infection  -     acyclovir (ZOVIRAX) 400 MG tablet; Take 1 tablet (400 mg total) by mouth once daily.  Dispense: 90 tablet; Refill: 1    5. Major depressive disorder with single episode, in partial remission  Continue Celexa.  Advised patient to follow-up with psychiatrist regarding if adjustments to medication regimen should be made.  Also advised starting talk therapy with counselor.            Vilma Muñoz MD

## 2024-01-05 ENCOUNTER — LAB VISIT (OUTPATIENT)
Dept: LAB | Facility: HOSPITAL | Age: 44
End: 2024-01-05
Attending: STUDENT IN AN ORGANIZED HEALTH CARE EDUCATION/TRAINING PROGRAM
Payer: COMMERCIAL

## 2024-01-05 DIAGNOSIS — E66.3 OVERWEIGHT (BMI 25.0-29.9): ICD-10-CM

## 2024-01-05 DIAGNOSIS — R53.83 FATIGUE, UNSPECIFIED TYPE: ICD-10-CM

## 2024-01-05 LAB
25(OH)D3+25(OH)D2 SERPL-MCNC: 46 NG/ML (ref 30–96)
ALBUMIN SERPL BCP-MCNC: 3.8 G/DL (ref 3.5–5.2)
ALP SERPL-CCNC: 58 U/L (ref 55–135)
ALT SERPL W/O P-5'-P-CCNC: 36 U/L (ref 10–44)
ANION GAP SERPL CALC-SCNC: 7 MMOL/L (ref 8–16)
AST SERPL-CCNC: 29 U/L (ref 10–40)
BASOPHILS # BLD AUTO: 0.03 K/UL (ref 0–0.2)
BASOPHILS NFR BLD: 0.9 % (ref 0–1.9)
BILIRUB SERPL-MCNC: 0.7 MG/DL (ref 0.1–1)
BUN SERPL-MCNC: 17 MG/DL (ref 6–20)
CALCIUM SERPL-MCNC: 9.2 MG/DL (ref 8.7–10.5)
CHLORIDE SERPL-SCNC: 105 MMOL/L (ref 95–110)
CHOLEST SERPL-MCNC: 192 MG/DL (ref 120–199)
CHOLEST/HDLC SERPL: 4.3 {RATIO} (ref 2–5)
CO2 SERPL-SCNC: 28 MMOL/L (ref 23–29)
CREAT SERPL-MCNC: 1.2 MG/DL (ref 0.5–1.4)
DIFFERENTIAL METHOD BLD: ABNORMAL
EOSINOPHIL # BLD AUTO: 0.2 K/UL (ref 0–0.5)
EOSINOPHIL NFR BLD: 5.2 % (ref 0–8)
ERYTHROCYTE [DISTWIDTH] IN BLOOD BY AUTOMATED COUNT: 13.4 % (ref 11.5–14.5)
EST. GFR  (NO RACE VARIABLE): >60 ML/MIN/1.73 M^2
ESTIMATED AVG GLUCOSE: 97 MG/DL (ref 68–131)
FERRITIN SERPL-MCNC: 81 NG/ML (ref 20–300)
FOLATE SERPL-MCNC: 15.5 NG/ML (ref 4–24)
GLUCOSE SERPL-MCNC: 96 MG/DL (ref 70–110)
HBA1C MFR BLD: 5 % (ref 4–5.6)
HCT VFR BLD AUTO: 47.6 % (ref 40–54)
HDLC SERPL-MCNC: 45 MG/DL (ref 40–75)
HDLC SERPL: 23.4 % (ref 20–50)
HGB BLD-MCNC: 16.3 G/DL (ref 14–18)
IMM GRANULOCYTES # BLD AUTO: 0.01 K/UL (ref 0–0.04)
IMM GRANULOCYTES NFR BLD AUTO: 0.3 % (ref 0–0.5)
IRON SERPL-MCNC: 126 UG/DL (ref 45–160)
LDLC SERPL CALC-MCNC: 120.6 MG/DL (ref 63–159)
LYMPHOCYTES # BLD AUTO: 1.4 K/UL (ref 1–4.8)
LYMPHOCYTES NFR BLD: 41.5 % (ref 18–48)
MAGNESIUM SERPL-MCNC: 1.9 MG/DL (ref 1.6–2.6)
MCH RBC QN AUTO: 30.3 PG (ref 27–31)
MCHC RBC AUTO-ENTMCNC: 34.2 G/DL (ref 32–36)
MCV RBC AUTO: 89 FL (ref 82–98)
MONOCYTES # BLD AUTO: 0.5 K/UL (ref 0.3–1)
MONOCYTES NFR BLD: 15.1 % (ref 4–15)
NEUTROPHILS # BLD AUTO: 1.2 K/UL (ref 1.8–7.7)
NEUTROPHILS NFR BLD: 37 % (ref 38–73)
NONHDLC SERPL-MCNC: 147 MG/DL
NRBC BLD-RTO: 0 /100 WBC
PLATELET # BLD AUTO: 228 K/UL (ref 150–450)
PMV BLD AUTO: 10 FL (ref 9.2–12.9)
POTASSIUM SERPL-SCNC: 4 MMOL/L (ref 3.5–5.1)
PROT SERPL-MCNC: 7.1 G/DL (ref 6–8.4)
RBC # BLD AUTO: 5.38 M/UL (ref 4.6–6.2)
SATURATED IRON: 34 % (ref 20–50)
SODIUM SERPL-SCNC: 140 MMOL/L (ref 136–145)
TESTOST SERPL-MCNC: 823 NG/DL (ref 304–1227)
TOTAL IRON BINDING CAPACITY: 374 UG/DL (ref 250–450)
TRANSFERRIN SERPL-MCNC: 253 MG/DL (ref 200–375)
TRIGL SERPL-MCNC: 132 MG/DL (ref 30–150)
TSH SERPL DL<=0.005 MIU/L-ACNC: 1.46 UIU/ML (ref 0.4–4)
VIT B12 SERPL-MCNC: 1153 PG/ML (ref 210–950)
WBC # BLD AUTO: 3.25 K/UL (ref 3.9–12.7)

## 2024-01-05 PROCEDURE — 84403 ASSAY OF TOTAL TESTOSTERONE: CPT | Performed by: STUDENT IN AN ORGANIZED HEALTH CARE EDUCATION/TRAINING PROGRAM

## 2024-01-05 PROCEDURE — 36415 COLL VENOUS BLD VENIPUNCTURE: CPT | Performed by: STUDENT IN AN ORGANIZED HEALTH CARE EDUCATION/TRAINING PROGRAM

## 2024-01-05 PROCEDURE — 84443 ASSAY THYROID STIM HORMONE: CPT | Performed by: STUDENT IN AN ORGANIZED HEALTH CARE EDUCATION/TRAINING PROGRAM

## 2024-01-05 PROCEDURE — 84402 ASSAY OF FREE TESTOSTERONE: CPT | Performed by: STUDENT IN AN ORGANIZED HEALTH CARE EDUCATION/TRAINING PROGRAM

## 2024-01-05 PROCEDURE — 84425 ASSAY OF VITAMIN B-1: CPT | Performed by: STUDENT IN AN ORGANIZED HEALTH CARE EDUCATION/TRAINING PROGRAM

## 2024-01-05 PROCEDURE — 82746 ASSAY OF FOLIC ACID SERUM: CPT | Performed by: STUDENT IN AN ORGANIZED HEALTH CARE EDUCATION/TRAINING PROGRAM

## 2024-01-05 PROCEDURE — 80061 LIPID PANEL: CPT | Performed by: STUDENT IN AN ORGANIZED HEALTH CARE EDUCATION/TRAINING PROGRAM

## 2024-01-05 PROCEDURE — 82607 VITAMIN B-12: CPT | Performed by: STUDENT IN AN ORGANIZED HEALTH CARE EDUCATION/TRAINING PROGRAM

## 2024-01-05 PROCEDURE — 82306 VITAMIN D 25 HYDROXY: CPT | Performed by: STUDENT IN AN ORGANIZED HEALTH CARE EDUCATION/TRAINING PROGRAM

## 2024-01-05 PROCEDURE — 83735 ASSAY OF MAGNESIUM: CPT | Performed by: STUDENT IN AN ORGANIZED HEALTH CARE EDUCATION/TRAINING PROGRAM

## 2024-01-05 PROCEDURE — 85025 COMPLETE CBC W/AUTO DIFF WBC: CPT | Performed by: STUDENT IN AN ORGANIZED HEALTH CARE EDUCATION/TRAINING PROGRAM

## 2024-01-05 PROCEDURE — 83036 HEMOGLOBIN GLYCOSYLATED A1C: CPT | Performed by: STUDENT IN AN ORGANIZED HEALTH CARE EDUCATION/TRAINING PROGRAM

## 2024-01-05 PROCEDURE — 82728 ASSAY OF FERRITIN: CPT | Performed by: STUDENT IN AN ORGANIZED HEALTH CARE EDUCATION/TRAINING PROGRAM

## 2024-01-05 PROCEDURE — 80053 COMPREHEN METABOLIC PANEL: CPT | Performed by: STUDENT IN AN ORGANIZED HEALTH CARE EDUCATION/TRAINING PROGRAM

## 2024-01-05 PROCEDURE — 83540 ASSAY OF IRON: CPT | Performed by: STUDENT IN AN ORGANIZED HEALTH CARE EDUCATION/TRAINING PROGRAM

## 2024-01-05 PROCEDURE — 84207 ASSAY OF VITAMIN B-6: CPT | Performed by: STUDENT IN AN ORGANIZED HEALTH CARE EDUCATION/TRAINING PROGRAM

## 2024-01-06 PROBLEM — F32.4 MAJOR DEPRESSIVE DISORDER WITH SINGLE EPISODE, IN PARTIAL REMISSION: Status: ACTIVE | Noted: 2024-01-06

## 2024-01-08 LAB — TESTOST FREE SERPL-MCNC: 24.9 PG/ML (ref 5.1–41.5)

## 2024-01-09 LAB — VIT B1 BLD-MCNC: 82 UG/L (ref 38–122)

## 2024-01-10 LAB — PYRIDOXAL SERPL-MCNC: 25 UG/L (ref 5–50)

## 2024-01-21 ENCOUNTER — PATIENT MESSAGE (OUTPATIENT)
Dept: PRIMARY CARE CLINIC | Facility: CLINIC | Age: 44
End: 2024-01-21
Payer: COMMERCIAL

## 2024-02-02 DIAGNOSIS — I10 HTN (HYPERTENSION), BENIGN: ICD-10-CM

## 2024-02-02 RX ORDER — LOSARTAN POTASSIUM 100 MG/1
100 TABLET ORAL
Qty: 90 TABLET | Refills: 3 | Status: SHIPPED | OUTPATIENT
Start: 2024-02-02

## 2024-03-06 ENCOUNTER — PATIENT MESSAGE (OUTPATIENT)
Dept: PRIMARY CARE CLINIC | Facility: CLINIC | Age: 44
End: 2024-03-06
Payer: COMMERCIAL

## 2024-07-09 DIAGNOSIS — B00.9 HSV INFECTION: ICD-10-CM

## 2024-07-09 RX ORDER — ACYCLOVIR 400 MG/1
400 TABLET ORAL
Qty: 90 TABLET | Refills: 1 | Status: SHIPPED | OUTPATIENT
Start: 2024-07-09

## 2024-07-09 NOTE — TELEPHONE ENCOUNTER
No care due was identified.  [facility] Embedded Care Due Messages. Reference number: [id number].   7/09/2024 1:57:00 AM CDT

## 2024-07-09 NOTE — TELEPHONE ENCOUNTER
Refill Decision Note   Felix Garciarichy  is requesting a refill authorization.  Brief Assessment and Rationale for Refill:  Approve     Medication Therapy Plan:         Comments:     Note composed:12:47 PM 07/09/2024             Appointments     Last Visit   1/4/2024 Vilma Muñoz MD   Next Visit   Visit date not found Vilma Muñoz MD

## 2024-07-10 DIAGNOSIS — I10 HTN (HYPERTENSION), BENIGN: ICD-10-CM

## 2024-07-11 RX ORDER — AMLODIPINE BESYLATE 10 MG/1
10 TABLET ORAL DAILY
Qty: 90 TABLET | Refills: 3 | Status: SHIPPED | OUTPATIENT
Start: 2024-07-11 | End: 2025-07-11

## 2024-07-11 NOTE — TELEPHONE ENCOUNTER
No care due was identified.  [facility] Embedded Care Due Messages. Reference number: [id number].   7/10/2024 7:44:17 PM CDT

## 2024-08-15 ENCOUNTER — PATIENT MESSAGE (OUTPATIENT)
Dept: SLEEP MEDICINE | Facility: CLINIC | Age: 44
End: 2024-08-15
Payer: COMMERCIAL

## 2024-08-16 ENCOUNTER — OFFICE VISIT (OUTPATIENT)
Dept: SLEEP MEDICINE | Facility: CLINIC | Age: 44
End: 2024-08-16
Attending: PSYCHIATRY & NEUROLOGY
Payer: COMMERCIAL

## 2024-08-16 VITALS
BODY MASS INDEX: 27.83 KG/M2 | WEIGHT: 173.19 LBS | HEART RATE: 86 BPM | SYSTOLIC BLOOD PRESSURE: 132 MMHG | HEIGHT: 66 IN | DIASTOLIC BLOOD PRESSURE: 92 MMHG

## 2024-08-16 DIAGNOSIS — E66.3 OVERWEIGHT (BMI 25.0-29.9): ICD-10-CM

## 2024-08-16 DIAGNOSIS — I10 HTN (HYPERTENSION), BENIGN: ICD-10-CM

## 2024-08-16 DIAGNOSIS — R53.83 FATIGUE, UNSPECIFIED TYPE: ICD-10-CM

## 2024-08-16 DIAGNOSIS — G47.30 SLEEP APNEA, UNSPECIFIED TYPE: Primary | ICD-10-CM

## 2024-08-16 PROCEDURE — 99999 PR PBB SHADOW E&M-EST. PATIENT-LVL III: CPT | Mod: PBBFAC,,, | Performed by: NURSE PRACTITIONER

## 2024-08-16 NOTE — PROGRESS NOTES
"Referred by Dr. Muñoz  CHIEF COMPLAINT: Sleep evaluation    HISTORY OF PRESENT ILLNESS: He has never had a sleep study. Girlfriend present today reports mild occasional snoring and the hold his breath. He is unaware. He lacks energy that he used to have and feels he should have. ESS=11. Low energy impacting activities/feels bad. +daytime sleepiness/tiredness. Headaches infrequent. Side sleeper. Denies teeth grinding or clenching. Didn't take BP meds yet today. Disrupted sleep 0-2x.       FAMILY HISTORY: Dad/brother ANITA  BP (!) 132/92 (BP Location: Left arm, Patient Position: Sitting, BP Method: Large (Automatic))   Pulse 86   Ht 5' 6" (1.676 m)   Wt 78.6 kg (173 lb 3.2 oz)   BMI 27.96 kg/m²   Neck circumference 18", modified mallampati II-III    ASSESSMENT:   Unspecified Sleep Apnea, with symptoms of mild snoring, witnessed apneic pauses, un-refreshing disrupted sleep and excessive daytime sleepiness,  with medical comorbidities of hypertension. Warrants further investigation for untreated sleep apnea.     PLAN:   1.  Home Sleep Study, discussed plan of care (if study + trial apap/nose with adherence monitoring)   2. Discussed etiology of ANITA and potential ramifications of untreated ANITA, including HTN.  We discussed potential treatment options, which could include mandibular advancement splint by dentist, or referral for surgical consideration or CPAP use/definitve option.   3. Take meds today and continue to see PCP as advised HTN mgt/continue med    Thank you for allowing me the opportunity to participate in the care of your patient          "

## 2024-08-26 ENCOUNTER — TELEPHONE (OUTPATIENT)
Dept: SLEEP MEDICINE | Facility: OTHER | Age: 44
End: 2024-08-26
Payer: COMMERCIAL

## 2024-08-26 NOTE — TELEPHONE ENCOUNTER
Tried calling to schedule the home sleep study,v/m is full.  Sent out a message through the portal to schedule.

## 2024-09-18 ENCOUNTER — TELEPHONE (OUTPATIENT)
Dept: SLEEP MEDICINE | Facility: OTHER | Age: 44
End: 2024-09-18
Payer: COMMERCIAL

## 2024-09-18 NOTE — DISCHARGE SUMMARY
"[facility] - Surgery ([facility])  Brief Operative Note    Surgery Date: 11/1/2022     Surgeon(s) and Role:     * Jossy Samayoa MD - Primary    Assisting Surgeon: Alessandro Talbert MD, PGY5    Pre-op Diagnosis:  Rupture of left distal biceps tendon, initial encounter [S46.212A]    Post-op Diagnosis:  Post-Op Diagnosis Codes:     * Rupture of left distal biceps tendon, initial encounter [S46.212A]    Procedure(s) (LRB):  REPAIR,TENDON,DISTAL PROXIMAL, BICEPS (Left)    Anesthesia: General    Operative Findings: Left distal biceps repair    Estimated Blood Loss: minimal          Specimens:   Specimen (24h ago, onward)      None              Discharge Note    OUTCOME: Patient tolerated treatment/procedure well without complication and is now ready for discharge.    DISPOSITION: Home or Self Care    FINAL DIAGNOSIS: Left distal biceps tear    FOLLOWUP: In clinic    DISCHARGE INSTRUCTIONS:    Discharge Procedure Orders   SLING FOR HOME USE   Order Comments: Post-op sling     Order Specific Question Answer Comments   Height: 5' 6" (1.676 m)    Weight: 77.6 kg (171 lb)    Does patient have medical equipment at home? none    Length of need (1-99 months): 12      Diet general     Call MD for:  temperature >100.4     Call MD for:  persistent nausea and vomiting     Call MD for:  severe uncontrolled pain     Call MD for:  difficulty breathing, headache or visual disturbances     Call MD for:  redness, tenderness, or signs of infection (pain, swelling, redness, odor or green/yellow discharge around incision site)     Call MD for:  hives     Call MD for:  persistent dizziness or light-headedness     Keep surgical extremity elevated     No driving, operating heavy equipment or signing legal documents while taking pain medication     Leave dressing on - Keep it clean, dry, and intact until clinic visit     Lifting restrictions   Order Comments: No lifting with surgical extremity.       " Unable to reach pt for final CT Program outreach.

## 2024-10-04 ENCOUNTER — TELEPHONE (OUTPATIENT)
Dept: PHARMACY | Facility: CLINIC | Age: 44
End: 2024-10-04
Payer: COMMERCIAL

## 2024-10-04 NOTE — TELEPHONE ENCOUNTER
[facility]/Population Health Chart Review & Patient Outreach Details For Medication Adherence Project    Reason for Outreach Encounter: 3rd Party payor non-compliance report ([employer], [employer], C, etc)  2.  Patient Outreach Method: Optichronhart message  3.   Medication in question:   Hypertension Medications               amLODIPine (NORVASC) 10 MG tablet Take 1 tablet (10 mg total) by mouth once daily.    losartan (COZAAR) 100 MG tablet TAKE 1 TABLET BY MOUTH EVERY DAY               LAST FILLED: 7/10/24 for 90 day supply  4.  Reviewed and or Updates Made To: Patient Chart  5. Outreach Outcomes and/or actions taken: Patient reminded to  prescription  Additional Notes:

## 2024-10-16 ENCOUNTER — PATIENT MESSAGE (OUTPATIENT)
Dept: ADMINISTRATIVE | Facility: HOSPITAL | Age: 44
End: 2024-10-16
Payer: COMMERCIAL

## 2024-10-25 ENCOUNTER — TELEPHONE (OUTPATIENT)
Dept: SLEEP MEDICINE | Facility: OTHER | Age: 44
End: 2024-10-25
Payer: COMMERCIAL

## 2024-11-03 ENCOUNTER — OFFICE VISIT (OUTPATIENT)
Dept: URGENT CARE | Facility: CLINIC | Age: 44
End: 2024-11-03
Payer: COMMERCIAL

## 2024-11-03 VITALS
HEART RATE: 100 BPM | OXYGEN SATURATION: 98 % | DIASTOLIC BLOOD PRESSURE: 94 MMHG | BODY MASS INDEX: 26.37 KG/M2 | TEMPERATURE: 98 F | HEIGHT: 67 IN | SYSTOLIC BLOOD PRESSURE: 141 MMHG | WEIGHT: 168 LBS | RESPIRATION RATE: 18 BRPM

## 2024-11-03 DIAGNOSIS — Z23 NEED FOR TDAP VACCINATION: ICD-10-CM

## 2024-11-03 DIAGNOSIS — T14.8XXA ABRASION OF SKIN: ICD-10-CM

## 2024-11-03 DIAGNOSIS — S43.402A SPRAIN OF LEFT SHOULDER, UNSPECIFIED SHOULDER SPRAIN TYPE, INITIAL ENCOUNTER: ICD-10-CM

## 2024-11-03 DIAGNOSIS — S49.92XA SHOULDER INJURY, LEFT, INITIAL ENCOUNTER: Primary | ICD-10-CM

## 2024-11-03 PROCEDURE — 90715 TDAP VACCINE 7 YRS/> IM: CPT | Mod: S$GLB,,, | Performed by: FAMILY MEDICINE

## 2024-11-03 PROCEDURE — 90471 IMMUNIZATION ADMIN: CPT | Mod: S$GLB,,, | Performed by: FAMILY MEDICINE

## 2024-11-03 PROCEDURE — 73030 X-RAY EXAM OF SHOULDER: CPT | Mod: LT,S$GLB,, | Performed by: RADIOLOGY

## 2024-11-03 PROCEDURE — 99214 OFFICE O/P EST MOD 30 MIN: CPT | Mod: 25,S$GLB,, | Performed by: NURSE PRACTITIONER

## 2024-11-03 RX ORDER — IBUPROFEN 600 MG/1
600 TABLET ORAL EVERY 8 HOURS PRN
Qty: 20 TABLET | Refills: 0 | Status: SHIPPED | OUTPATIENT
Start: 2024-11-03

## 2024-11-03 RX ORDER — MUPIROCIN 20 MG/G
OINTMENT TOPICAL 2 TIMES DAILY
Qty: 30 G | Refills: 0 | Status: SHIPPED | OUTPATIENT
Start: 2024-11-03 | End: 2024-11-08

## 2024-11-03 NOTE — PROGRESS NOTES
"Subjective:      Patient ID: Felix Stern is a 44 y.o. male.    Vitals:  height is 5' 7" (1.702 m) and weight is 76.2 kg (168 lb). His tympanic temperature is 98.2 °F (36.8 °C). His blood pressure is 141/94 (abnormal) and his pulse is 100. His respiration is 18 and oxygen saturation is 98%.     Chief Complaint: Shoulder Injury    45 y/o male c/o with left shoulder injury. Patient states he injured his left shoulder last night. Patient states he took OTC meds. Patient states it hurts to move his left shoulder Patient states he was on a skateboard and did on his left elbow but no pain on the left elbow only skin abrasion, Patient states he can't move his arm above his head.  Denies head trauma injury, denies fever, body aches or chills, denies cough, wheezing or shortness of breath, denies nausea, vomiting, diarrhea or abdominal pain, denies chest pain or dizziness positional lightheadedness, denies sore throat or trouble swallowing, denies loss of taste or smell, or any other symptoms       Arm Injury   His dominant hand is their left hand. The incident occurred 12 to 24 hours ago. The incident occurred in the street. The injury mechanism was a fall. The pain is present in the left elbow. The quality of the pain is described as aching and shooting. The pain is at a severity of 6/10. The pain is moderate. The pain has been Constant since the incident. Associated symptoms include muscle weakness. Pertinent negatives include no chest pain, numbness or tingling. The symptoms are aggravated by movement. He has tried acetaminophen for the symptoms. The treatment provided mild relief.       Cardiovascular:  Negative for chest pain.   Musculoskeletal:  Positive for pain, trauma, joint pain and abnormal ROM of joint.   Neurological:  Negative for numbness.      Objective:     Physical Exam   Constitutional: He is oriented to person, place, and time. He appears well-developed. He is cooperative.  Non-toxic " appearance. He does not appear ill. No distress.   HENT:   Head: Normocephalic and atraumatic. Head is without abrasion, without contusion and without laceration.   Ears:   Right Ear: Hearing, tympanic membrane, external ear and ear canal normal. No hemotympanum.   Left Ear: Hearing, tympanic membrane, external ear and ear canal normal. No hemotympanum.   Nose: Nose normal. No mucosal edema, rhinorrhea or nasal deformity. No epistaxis. Right sinus exhibits no maxillary sinus tenderness and no frontal sinus tenderness. Left sinus exhibits no maxillary sinus tenderness and no frontal sinus tenderness.   Mouth/Throat: Uvula is midline, oropharynx is clear and moist and mucous membranes are normal. No trismus in the jaw. Normal dentition. No uvula swelling. No posterior oropharyngeal erythema.   Eyes: Conjunctivae, EOM and lids are normal. Pupils are equal, round, and reactive to light. Right eye exhibits no discharge. Left eye exhibits no discharge. No scleral icterus.   Neck: Trachea normal and phonation normal. Neck supple. No tracheal deviation present. No neck rigidity present. No spinous process tenderness present. No muscular tenderness present.   Cardiovascular: Normal rate, regular rhythm, normal heart sounds and normal pulses.   Pulmonary/Chest: Effort normal and breath sounds normal. No respiratory distress.   Abdominal: Normal appearance and bowel sounds are normal. He exhibits no distension and no mass. Soft. There is no abdominal tenderness.   Musculoskeletal:         General: No deformity.      Left shoulder: He exhibits decreased range of motion and tenderness (to left ac joint). He exhibits no swelling and no deformity.      Left elbow: Normal. Lacerations: skin abrasion noted to left elbw, rom intact of left elbow.      Comments:  Cap refill 2 seconds, left radial pulse 2+, sensation intact       Neurological: He is alert and oriented to person, place, and time. He has normal strength. No cranial nerve  deficit or sensory deficit. He exhibits normal muscle tone. He displays no seizure activity. Coordination normal. GCS eye subscore is 4. GCS verbal subscore is 5. GCS motor subscore is 6.   Skin: Skin is warm, dry, intact, not diaphoretic and not pale. Capillary refill takes less than 2 seconds. No abrasion, No burn, No bruising and No ecchymosis   Psychiatric: His speech is normal and behavior is normal. Judgment and thought content normal.   Nursing note and vitals reviewed.    XR SHOULDER TRAUMA 3 VIEW LEFT    Result Date: 11/3/2024  EXAMINATION: XR SHOULDER TRAUMA 3 VIEW LEFT CLINICAL HISTORY: Unspecified injury of left shoulder and upper arm, initial encounter TECHNIQUE: Three views of the left shoulder were performed. COMPARISON Left shoulder series 11/03/2020 FINDINGS: Bones are well mineralized. Overall alignment is within normal limits. No displaced fracture, dislocation or destructive osseous process.  Mild degenerative change at the AC joint.  No subcutaneous emphysema or radiopaque foreign body.  Mild left apical pleuroparenchymal scarring.  No left apical pneumothorax.     No acute displaced fracture-dislocation identified. Electronically signed by: Ruy Atkinson MD Date:    11/03/2024 Time:    17:03      Patient in no acute distress.  Vitals reassuring.  Discussed results/diagnosis/plan in depth with patient in clinic. Strict precautions given to patient to monitor for worsening signs and symptoms. Advised to follow up with primary.All questions answered. Strict ER precautions given. If your symptoms worsens or fail to improve you should go to the Emergency Room. Discharge and follow-up instructions given verbally/printed. Discharge and follow-up instructions discussed with the patient who expressed understanding and willingness to comply with my recommendations.Patient voiced understanding and in agreement with current treatment plan.     Please be advised this text was dictated with [employer] software  and may contain errors due to translation.   Assessment:     1. Shoulder injury, left, initial encounter    2. Sprain of left shoulder, unspecified shoulder sprain type, initial encounter        Plan:       Shoulder injury, left, initial encounter  -     XR SHOULDER TRAUMA 3 VIEW LEFT; Future; Expected date: 11/03/2024  -     Ambulatory referral/consult to Orthopedics    Sprain of left shoulder, unspecified shoulder sprain type, initial encounter    Other orders  -     ibuprofen (ADVIL,MOTRIN) 600 MG tablet; Take 1 tablet (600 mg total) by mouth every 8 (eight) hours as needed for Pain.  Dispense: 20 tablet; Refill: 0          Medical Decision Making:   Clinical Tests:   Radiological Study: Reviewed and Ordered  Urgent Care Management:  Patient in no acute distress.  Vitals reassuring.  On exam, patient is nontoxic appearing and afebrile.  Lungs CTA.  Physical examination as above.  Patient with limited range of motion of left shoulder secondary to pain.  X-ray results discussed with patient in detail.  Will refer him to Orthopedics for further evaluation if no improvement of or worsening symptoms.  Tetanus updated in clinic  Medication prescribed and over-the-counter medication discussed with patient at length.  Proper hydration advised.  I reiterated the importance of further evaluation if no improvement symptoms and follow-up with primary. Patient voiced understanding and in agreement with current treatment plan.           Patient Instructions   PLEASE READ YOUR DISCHARGE INSTRUCTIONS ENTIRELY AS IT CONTAINS IMPORTANT INFORMATION.    Tylenol and ibuprofen for pain    Rest, ice, compress, and elevate at home    Avoid prolonged use of the affected area until better.     Please return or see your primary care doctor if you develop new or worsening symptoms.     Please arrange follow up with your primary medical clinic as soon as possible. You must understand that you've received an Urgent Care treatment only and  that you may be released before all of your medical problems are known or treated. You, the patient, will arrange for follow up as instructed. If your symptoms worsen or fail to improve you should go to the Emergency Room.

## 2024-11-24 ENCOUNTER — PATIENT MESSAGE (OUTPATIENT)
Dept: ADMINISTRATIVE | Facility: HOSPITAL | Age: 44
End: 2024-11-24
Payer: COMMERCIAL

## 2024-11-26 ENCOUNTER — PATIENT MESSAGE (OUTPATIENT)
Dept: ADMINISTRATIVE | Facility: HOSPITAL | Age: 44
End: 2024-11-26
Payer: COMMERCIAL

## 2024-11-27 DIAGNOSIS — Z12.11 SCREENING FOR COLON CANCER: ICD-10-CM

## 2024-12-04 ENCOUNTER — HOSPITAL ENCOUNTER (OUTPATIENT)
Dept: RADIOLOGY | Facility: HOSPITAL | Age: 44
Discharge: HOME | End: 2024-12-04
Attending: ORTHOPAEDIC SURGERY
Payer: COMMERCIAL

## 2024-12-04 ENCOUNTER — OFFICE VISIT (OUTPATIENT)
Dept: SPORTS MEDICINE | Facility: CLINIC | Age: 44
End: 2024-12-04
Payer: COMMERCIAL

## 2024-12-04 VITALS
SYSTOLIC BLOOD PRESSURE: 149 MMHG | HEIGHT: 67 IN | HEART RATE: 90 BPM | DIASTOLIC BLOOD PRESSURE: 107 MMHG | BODY MASS INDEX: 26.37 KG/M2 | WEIGHT: 168 LBS

## 2024-12-04 DIAGNOSIS — M25.512 ACUTE PAIN OF LEFT SHOULDER: Primary | ICD-10-CM

## 2024-12-04 DIAGNOSIS — M25.512 LEFT SHOULDER PAIN, UNSPECIFIED CHRONICITY: ICD-10-CM

## 2024-12-04 PROCEDURE — 3080F DIAST BP >= 90 MM HG: CPT | Mod: CPTII,S$GLB,, | Performed by: ORTHOPAEDIC SURGERY

## 2024-12-04 PROCEDURE — 73030 X-RAY EXAM OF SHOULDER: CPT | Mod: TC,LT

## 2024-12-04 PROCEDURE — 4010F ACE/ARB THERAPY RXD/TAKEN: CPT | Mod: CPTII,S$GLB,, | Performed by: ORTHOPAEDIC SURGERY

## 2024-12-04 PROCEDURE — 73030 X-RAY EXAM OF SHOULDER: CPT | Mod: 26,LT,, | Performed by: RADIOLOGY

## 2024-12-04 PROCEDURE — 99999 PR PBB SHADOW E&M-EST. PATIENT-LVL III: CPT | Mod: PBBFAC,,, | Performed by: ORTHOPAEDIC SURGERY

## 2024-12-04 PROCEDURE — 3044F HG A1C LEVEL LT 7.0%: CPT | Mod: CPTII,S$GLB,, | Performed by: ORTHOPAEDIC SURGERY

## 2024-12-04 PROCEDURE — 3077F SYST BP >= 140 MM HG: CPT | Mod: CPTII,S$GLB,, | Performed by: ORTHOPAEDIC SURGERY

## 2024-12-04 PROCEDURE — 1159F MED LIST DOCD IN RCRD: CPT | Mod: CPTII,S$GLB,, | Performed by: ORTHOPAEDIC SURGERY

## 2024-12-04 PROCEDURE — 99214 OFFICE O/P EST MOD 30 MIN: CPT | Mod: S$GLB,,, | Performed by: ORTHOPAEDIC SURGERY

## 2024-12-04 PROCEDURE — 3008F BODY MASS INDEX DOCD: CPT | Mod: CPTII,S$GLB,, | Performed by: ORTHOPAEDIC SURGERY

## 2024-12-04 NOTE — PROGRESS NOTES
CC: left shoulder pain, patient owns and works at an [employer]      44 y.o. Male RHD reports that the pain is severe and not responding to any conservative care.      Pain has been present since 11/2/24  He describes a fall fall onto his elbow that pushed his shoulder upward, he was having significant trouble moving his shoulder, he had x-ray at urgent care on 11/3/24 and was told he didn't have a fracture and to continue to monitor his pain and function    He notes his pain improved but he is having trouble with moving the shoulder in any direction  He is only able to lift a few degrees and then has to assist his arm up with his non involved arm   He notes his pain has worsened over the past few days    He reports that the pain is worse with any activity. It also bothers him at night.    Is affecting ADLs.     He has been taking ibuprofen for 4 weeks with relief that is temporary   He has also used ice    SANE: 35    Review of Systems   Constitution: Negative. Negative for chills, fever and night sweats.   HENT: Negative for congestion and headaches.    Eyes: Negative for blurred vision, left vision loss and right vision loss.   Cardiovascular: Negative for chest pain and syncope.   Respiratory: Negative for cough and shortness of breath.    Endocrine: Negative for polydipsia, polyphagia and polyuria.   Hematologic/Lymphatic: Negative for bleeding problem. Does not bruise/bleed easily.   Skin: Negative for dry skin, itching and rash.   Musculoskeletal: Negative for falls and muscle weakness.   Gastrointestinal: Negative for abdominal pain and bowel incontinence.   Genitourinary: Negative for bladder incontinence and nocturia.   Neurological: Negative for disturbances in coordination, loss of balance and seizures.   Psychiatric/Behavioral: Negative for depression. The patient does not have insomnia.    Allergic/Immunologic: Negative for hives and persistent infections.     PAST MEDICAL HISTORY:   Past  Medical History:   Diagnosis Date    GERD (gastroesophageal reflux disease)     HTN (hypertension), benign 2/10/2023     PAST SURGICAL HISTORY:   Past Surgical History:   Procedure Laterality Date    HAND SURGERY      REPAIR,TENDON,DISTAL PROXIMAL Left 12/8/2022    Procedure: REPAIR,TENDON,DISTAL PROXIMAL;  Surgeon: Jossy Samayoa MD;  Location: [facility];  Service: Orthopedics;  Laterality: Left;  Distal bicep repair    VASECTOMY       FAMILY HISTORY:   Family History   Problem Relation Name Age of Onset    Colon cancer Neg Hx      Esophageal cancer Neg Hx      Stomach cancer Neg Hx      Rectal cancer Neg Hx       SOCIAL HISTORY:   Social History     Socioeconomic History    Marital status:    Tobacco Use    Smoking status: Never    Smokeless tobacco: Never   Substance and Sexual Activity    Alcohol use: Yes     Comment: occasionally    Drug use: No       MEDICATIONS:   Current Outpatient Medications:     amLODIPine (NORVASC) 10 MG tablet, Take 1 tablet (10 mg total) by mouth once daily., Disp: 90 tablet, Rfl: 3    ibuprofen (ADVIL,MOTRIN) 600 MG tablet, Take 1 tablet (600 mg total) by mouth every 8 (eight) hours as needed for Pain., Disp: 20 tablet, Rfl: 0    losartan (COZAAR) 100 MG tablet, TAKE 1 TABLET BY MOUTH EVERY DAY, Disp: 90 tablet, Rfl: 3    multivitamin (THERAGRAN) tablet, Take 1 tablet by mouth once daily., Disp: , Rfl:     VENTOLIN HFA 90 mcg/actuation inhaler, , Disp: , Rfl:     acyclovir (ZOVIRAX) 400 MG tablet, TAKE 1 TABLET BY MOUTH ONCE DAILY (Patient not taking: Reported on 12/4/2024), Disp: 90 tablet, Rfl: 1    citalopram (CELEXA) 20 MG tablet, Take 20 mg by mouth once daily. (Patient not taking: Reported on 12/4/2024), Disp: , Rfl:     esomeprazole magnesium (NEXIUM) 10 mg suspension, Take 10 mg by mouth before breakfast. (Patient not taking: Reported on 12/4/2024), Disp: , Rfl:     sumatriptan (IMITREX) 25 MG Tab, Take 2 tablets (50 mg total) by mouth every 6 (six) hours as needed  "(migraine)., Disp: 15 tablet, Rfl: 0  ALLERGIES: Review of patient's allergies indicates:  No Known Allergies    VITAL SIGNS: BP (!) 149/107   Pulse 90   Ht 5' 7" (1.702 m)   Wt 76.2 kg (168 lb)   BMI 26.31 kg/m²      PHYSICAL EXAMINATION:  General:  The patient is alert and oriented x 3.  Mood is pleasant.  Observation of ears, eyes and nose reveal no gross abnormalities.  No labored breathing observed.  Gait is coordinated. Patient can toe walk and heel walk without difficulty.      left SHOULDER / UPPER EXTREMITY EXAM    OBSERVATION:     Swelling  none  Deformity  none   Discoloration  none   Scapular winging none   Scars   none  Atrophy  none    TENDERNESS / CREPITUS (T/C):          T/C      T/C   Clavicle   -/-  SUPRAspinatus    -/-     AC Jt.    -/-  INFRAspinatus  -/-    SC Jt.    -/-  Deltoid    -/-      G. Tuberosity  -/-  LH BICEP groove  +/-   Acromion:  -/-  Midline Neck   -/-     Scapular Spine -/-  Trapezium   -/-   SMA Scapula  -/-  GH jt. line - post  -/-     Scapulothoracic  -/-         ROM: (* = with pain)  Right shoulder   Left shoulder        AROM (PROM)   AROM (PROM)   FE    170° (175°)     80° (170°)     ER at 0°    65°  (70°)    50°  (55°)   ER at 90° ABD  90°  (90°)    90°  (90°)   IR at 90°  ABD   NA  (40°)     NA  (40°)      IR (spine level)   T10     T12    STRENGTH: (* = with pain) RIGHT SHOULDER  LEFT SHOULDER   SCAPTION at 0  5/5    4/5   SCAPTION at 30  5/5    5-/5    IR    5/5    5/5   ER    5/5    5-/5   BICEPS   5/5    5/5   Deltoid    5/5    5/5     SIGNS:  Painful side       NEER   +    OAKSHATS  +    ALFARO   +    SPEEDS  +     DROP ARM   neg   BELLY PRESS neg   Superior escape none    LIFT-OFF  neg   X-Body ADD    neg    MOVING VALGUS neg        STABILITY TESTING    RIGHT SHOULDER   LEFT SHOULDER     Translation     Anterior  up face     up face    Posterior  up face    up face    Sulcus   < 10mm    < 10 mm     Signs   Apprehension   neg      neg       Relocation   no " change     no change      Jerk test  neg     neg    EXTREMITY NEURO-VASCULAR EXAM    Sensation grossly intact to light touch all dermatomal regions.    DTR 2+ Biceps, Triceps, BR and Negative Johns sign   Grossly intact motor function at Elbow, Wrist and Hand   Distal pulses radial and ulnar 2+, brisk cap refill, symmetric.      NECK:  Painless FROM and spinous processes non-tender. Negative Spurlings sign.      OTHER FINDINGS:      XRAYS reviewed and interpreted personally by me:  Shoulder trauma series left,  were obtained and reviewed  No convincing fracture or dislocation is noted. The osseous structures appear well mineralized and well aligned      ASSESSMENT:   left:  1. Shoulder probable rotator cuff tear       PLAN:  I do think that this is likely a rotator cuff tear.    I have recommended we check an MRI of the shoulder to evaluate this.    Depending on the results of the MRI, we may consider a cortisone   injection and physical therapy and/or arthroscopic intervention and treatment   depending on what we find.      All questions were answered, pt will contact us for questions or concerns in the interim.     I made the decision to obtain old records of the patient including previous notes and imaging. New imaging was ordered today of the extremity or extremities evaluated. I independently reviewed and interpreted the radiographs and/or MRIs today as well as prior imaging.

## 2024-12-07 ENCOUNTER — HOSPITAL ENCOUNTER (OUTPATIENT)
Dept: RADIOLOGY | Facility: HOSPITAL | Age: 44
Discharge: HOME | End: 2024-12-07
Attending: ORTHOPAEDIC SURGERY
Payer: COMMERCIAL

## 2024-12-07 DIAGNOSIS — M25.512 ACUTE PAIN OF LEFT SHOULDER: ICD-10-CM

## 2024-12-07 PROCEDURE — 73221 MRI JOINT UPR EXTREM W/O DYE: CPT | Mod: 26,LT,, | Performed by: RADIOLOGY

## 2024-12-07 PROCEDURE — 73221 MRI JOINT UPR EXTREM W/O DYE: CPT | Mod: TC,LT

## 2024-12-09 ENCOUNTER — PATIENT MESSAGE (OUTPATIENT)
Dept: SPORTS MEDICINE | Facility: CLINIC | Age: 44
End: 2024-12-09
Payer: COMMERCIAL

## 2024-12-09 ENCOUNTER — PATIENT MESSAGE (OUTPATIENT)
Dept: PRIMARY CARE CLINIC | Facility: CLINIC | Age: 44
End: 2024-12-09
Payer: COMMERCIAL

## 2024-12-10 ENCOUNTER — TELEPHONE (OUTPATIENT)
Dept: SPORTS MEDICINE | Facility: CLINIC | Age: 44
End: 2024-12-10
Payer: COMMERCIAL

## 2024-12-10 DIAGNOSIS — M75.22 BICEPS TENDONITIS ON LEFT: ICD-10-CM

## 2024-12-10 DIAGNOSIS — S43.432A SLAP LESION OF LEFT SHOULDER: ICD-10-CM

## 2024-12-10 DIAGNOSIS — S46.012A TRAUMATIC ROTATOR CUFF TEAR, LEFT, INITIAL ENCOUNTER: Primary | ICD-10-CM

## 2024-12-10 NOTE — TELEPHONE ENCOUNTER
Dr. Samayoa called the patient and discussed his results and plan below    MRI Left shoulder reviewed and interpreted personally by me: Full thickness supraspinatus and infraspinatus tear with retraction to the superior humeral head, no atrophy. SLAP, biceps tendinitis    ASSESSMENT:    Left Shoulder Rotator Cuff Tear, SLAP, biceps tendonitis.      he would benefit from an arthroscopy, given the above.       PLAN:   Left Shoulder rotator cuff tear     All questions were answered, pt will contact us for questions or concerns in the interim.  Had thorough discussion of non-operative vs operative intervention, and risks and benefits of both.     We have discussed the surgery and recovery of arthroscopic shoulder surgery. he understands that there may be limited mobility up to several weeks after surgery depending on procedures that are performed at the time of surgery.    The spectrum of treatment options were discussed with the patient, including nonoperative and operative options.  After thorough discussion, the patient has elected to undergo surgical treatment to include:    left   a. Shoulder arthroscopic rotator cuff repair with Cuffmend   b. Shoulder arthroscopic SAD   c. Shoulder arthroscopic extensive debridement   d. Shoulder arthroscopic biceps tenodesis (vs. subpect tenodesis)   e. Shoulder arthroscopic possible microfracture   f.  Shoulder open reduction internal fixation greater tuberosity    The details of the surgical procedure were explained, including the location of probable incisions and a description of likely hardware and/or grafts to be used.  The patient understands the likely convalescence after surgery.  Also, we have thoroughly discussed the risks, benefits and alternatives to surgery, including, but not limited to, the risk of infection, joint stiffness, blood clot (including DVT and/or pulmonary embolus), neurologic and vascular injury.  It was explained that, if tissue has been repaired or  reconstructed, there is a chance of failure, which may require further management.  Consent form for surgery is signed and in chart.

## 2024-12-12 ENCOUNTER — PATIENT MESSAGE (OUTPATIENT)
Dept: PREADMISSION TESTING | Facility: HOSPITAL | Age: 44
End: 2024-12-12
Payer: COMMERCIAL

## 2024-12-12 NOTE — ANESTHESIA PAT ROS NOTE
12/12/2024  Felix Stern is a 44 y.o., male.      Pre-op Assessment    I have reviewed the Patient Summary Reports.       I have reviewed the Medications.       Anesthesia History:   Denies H/O anesthesia complications  General, direct laryngoscopy, Easy mask ventilation, grade 1 on direct laryngoscopy      Review of Systems  Social:  Non-Smoker, Social Alcohol Use         Hematology:  Denies H/O anemia,  Fatigue    Cardiovascular:  Exercise tolerance: good   Hypertension             Denies FREIRE.    Patient not on beta blockers                          Pulmonary:    Asthma   Denies Shortness of breath,   mild intermittent asthma, Sleep apnea unspecified type- seen by Sleep Med, has not completed sleep study yet     ENT:    Chronic seasonal allergic rhinitis due to pollen,       Hypertrophy of nasal turbinates, Nasal obstruction, Chronic sinusitis, S/P in office Balloon Sinuplasty    Renal/:   Denies Chronic Renal Disease.   H/O Vasectomy             Hepatic/GI:     GERD Denies Liver Disease.   Not Taking GLP-1 Agonists            Musculoskeletal:     Traumatic rotator cuff tear, left,  Biceps tendonitis on left,  SLAP lesion of left shoulder,  H/O hand surgery,   Tendon Repair left distal Biceps tendon,  H/O stress fx of 2nd metatarsal bone of left foot with routine healing              Neurological:    Denies CVA.   Headaches, migraine headaches,  Denies Seizures.                                Endocrine:  Endocrine Normal Denies Diabetes. Denies Hypothyroidism.          Psych:  Psychiatric History  depression, generalized anxiety, adjustment disorder             Past Medical History:   Diagnosis Date    GERD (gastroesophageal reflux disease)     HTN (hypertension), benign 2/10/2023     Past Surgical History:   Procedure Laterality Date    HAND SURGERY      REPAIR,TENDON,DISTAL PROXIMAL Left  12/8/2022    Procedure: REPAIR,TENDON,DISTAL PROXIMAL;  Surgeon: Jossy Samayoa MD;  Location: [facility] OR;  Service: Orthopedics;  Laterality: Left;  Distal bicep repair    VASECTOMY         Anesthesia Assessment: Preoperative EQUATION    Planned Procedure: Procedure(s) (LRB):  REPAIR, ROTATOR CUFF, ARTHROSCOPIC (Left)  DEBRIDEMENT, SHOULDER, ARTHROSCOPIC (Left)  REPAIR (Left)  ARTHROSCOPY,SHOULDER,WITH BICEPS TENODESIS (Left)  Requested Anesthesia Type:General  Surgeon: Jossy Samayoa MD  Service: Orthopedics  Known or anticipated Date of Surgery:12/19/2024    Surgeon notes: reviewed    Electronic QUestionnaire Assessment completed via nurse interview with patient.        Triage considerations:     The patient has no apparent active cardiac condition (No unstable coronary Syndrome such as severe unstable angina or recent [<1 month] myocardial infarction, decompensated CHF, severe valvular   disease or significant arrhythmia)    Previous anesthesia records:GETA, Easy airway, Easy intubation, and No problems    Last PCP note: within 1 month , within [facility]   Subspecialty notes:  Sleep Med    Other important co-morbidities: ANITA, GERD and HTN       EKG 12/13/2024:  Vent. Rate :  99 BPM     Atrial Rate :  99 BPM      P-R Int : 160 ms          QRS Dur :  90 ms       QT Int : 344 ms       P-R-T Axes : 263  33   3 degrees     QTcB Int : 441 ms   Unusual P axis, possible ectopic atrial rhythm   Abnormal ECG   No previous ECGs available   Confirmed by Thomas Kirk ([id number]) on 12/18/2024 4:26:33 PM        Tests already available:  Results have been reviewed.             Instructions given. (See in Nurse's note) Preop medication instructions sent via portal message.     Optimization:  Anesthesia Preop Clinic Assessment Not Indicated    Medical Opinion Indicated: Yes, PCP       Sub-specialist consult indicated:   No      Plan: Consultation:Patient's PCP for a statement of optimization              Patient  has previously scheduled  Medical Appointment: 12/13/2024    Navigation: Tests Scheduled.              Consults scheduled.             Patient is to be cleared/ optimized for surgery by PCP.       Ht: 5'7  Wt: 76.2 kg (168 lb)  BMI: 26.31

## 2024-12-13 ENCOUNTER — TELEPHONE (OUTPATIENT)
Dept: PHARMACY | Facility: CLINIC | Age: 44
End: 2024-12-13
Payer: COMMERCIAL

## 2024-12-13 ENCOUNTER — OFFICE VISIT (OUTPATIENT)
Dept: PRIMARY CARE CLINIC | Facility: CLINIC | Age: 44
End: 2024-12-13
Payer: COMMERCIAL

## 2024-12-13 ENCOUNTER — HOSPITAL ENCOUNTER (OUTPATIENT)
Dept: RADIOLOGY | Facility: HOSPITAL | Age: 44
Discharge: HOME | End: 2024-12-13
Attending: NURSE PRACTITIONER
Payer: COMMERCIAL

## 2024-12-13 VITALS
BODY MASS INDEX: 27.93 KG/M2 | HEART RATE: 111 BPM | RESPIRATION RATE: 17 BRPM | DIASTOLIC BLOOD PRESSURE: 82 MMHG | SYSTOLIC BLOOD PRESSURE: 124 MMHG | HEIGHT: 67 IN | WEIGHT: 177.94 LBS | OXYGEN SATURATION: 97 %

## 2024-12-13 DIAGNOSIS — I10 HTN (HYPERTENSION), BENIGN: ICD-10-CM

## 2024-12-13 DIAGNOSIS — Z01.818 PRE-OP EXAMINATION: ICD-10-CM

## 2024-12-13 DIAGNOSIS — J45.20 MILD INTERMITTENT ASTHMA, UNSPECIFIED WHETHER COMPLICATED: ICD-10-CM

## 2024-12-13 DIAGNOSIS — M25.512 CHRONIC LEFT SHOULDER PAIN: ICD-10-CM

## 2024-12-13 DIAGNOSIS — G89.29 CHRONIC LEFT SHOULDER PAIN: ICD-10-CM

## 2024-12-13 DIAGNOSIS — R29.898 WEAKNESS OF LEFT UPPER EXTREMITY: ICD-10-CM

## 2024-12-13 DIAGNOSIS — K21.9 GASTROESOPHAGEAL REFLUX DISEASE WITHOUT ESOPHAGITIS: ICD-10-CM

## 2024-12-13 DIAGNOSIS — Z01.818 PRE-OP EXAMINATION: Primary | ICD-10-CM

## 2024-12-13 DIAGNOSIS — H60.12 CELLULITIS OF AURICLE OF LEFT EAR: ICD-10-CM

## 2024-12-13 PROCEDURE — 71045 X-RAY EXAM CHEST 1 VIEW: CPT | Mod: 26,,, | Performed by: RADIOLOGY

## 2024-12-13 PROCEDURE — 71045 X-RAY EXAM CHEST 1 VIEW: CPT | Mod: TC

## 2024-12-13 PROCEDURE — 99999 PR PBB SHADOW E&M-EST. PATIENT-LVL IV: CPT | Mod: PBBFAC,,, | Performed by: NURSE PRACTITIONER

## 2024-12-13 RX ORDER — MUPIROCIN 20 MG/G
OINTMENT TOPICAL
Qty: 44 G | Refills: 0 | Status: SHIPPED | OUTPATIENT
Start: 2024-12-13

## 2024-12-13 NOTE — TELEPHONE ENCOUNTER
[facility]/Population Health Chart Review & Patient Outreach Details For Medication Adherence Project    Reason for Outreach Encounter: 3rd Party payor non-compliance report ([employer], [employer], [employer], etc)  2.  Patient Outreach Method: Reviewed patient chart   3.   Medication in question:              losartan  last filled  10/23 for 90 day supply      4.  Reviewed and or Updates Made To: Patient Chart  5. Outreach Outcomes and/or actions taken: Patient filled medication and is on track to be adherent  Additional Notes:

## 2024-12-13 NOTE — PROGRESS NOTES
[facility] Note    Chief Complaint      Chief Complaint   Patient presents with    Pre-op Exam     History of Present Illness      Felix Stern is a 44 y.o. male patient of Dr. Muñoz with chronic conditions of migraines, depression, seasonal allergies, asthma, hypertension, GERD who presents today for pre-op for rotator cuff repair with Dr. Samayoa on 12/19/24    Labs, chest x-ray and EKG all ordered  No allergies to any medications    History of Present Illness    CHIEF COMPLAINT:  Felix presents today for pre-operative evaluation and ear discomfort.    HISTORY OF PRESENT ILLNESS:  He sustained a left shoulder tear after falling on his elbow during the first week of November, now presenting for pre-operative evaluation for left shoulder surgery. He also reports puffiness in the ear lobe that started approximately one week ago with mild pain on pressure.    MEDICAL HISTORY:  He has a history of childhood asthma with rare current occurrences.    MEDICATIONS:  He takes aciclovir and Nexium daily for reflux.      ROS:  ENT: +ear pain, +ear pressure  Respiratory: -wheezing         Health Maintenance   Topic Date Due    Hepatitis C Screening  Never done    HIV Screening  Never done    Colorectal Cancer Screening  Never done    Influenza Vaccine (1) Never done    COVID-19 Vaccine (1 - 2024-25 season) Never done    Hemoglobin A1c (Diabetic Prevention Screening)  01/05/2027    Lipid Panel  01/05/2029    TETANUS VACCINE  11/03/2034    RSV Vaccine (Age 60+ and Pregnant patients) (1 - 1-dose 75+ series) 10/24/2053    Pneumococcal Vaccines (Age 0-64)  Aged Out       Past Medical History:   Diagnosis Date    GERD (gastroesophageal reflux disease)     HTN (hypertension), benign 2/10/2023       Past Surgical History:   Procedure Laterality Date    HAND SURGERY      REPAIR,TENDON,DISTAL PROXIMAL Left 12/8/2022    Procedure: REPAIR,TENDON,DISTAL PROXIMAL;  Surgeon: Jossy Samayoa MD;  Location: [facility] OR;   Service: Orthopedics;  Laterality: Left;  Distal bicep repair    VASECTOMY         family history is not on file.     Social History     Tobacco Use    Smoking status: Never    Smokeless tobacco: Never   Substance Use Topics    Alcohol use: Yes     Comment: occasionally    Drug use: No       Outpatient Encounter Medications as of 12/13/2024   Medication Sig Note Dispense Refill    amLODIPine (NORVASC) 10 MG tablet Take 1 tablet (10 mg total) by mouth once daily. 12/12/2024: Take with a sip of water on am of surgery.   90 tablet 3    ibuprofen (ADVIL,MOTRIN) 600 MG tablet Take 1 tablet (600 mg total) by mouth every 8 (eight) hours as needed for Pain. 12/12/2024: Stop 7 days before surgery   20 tablet 0    losartan (COZAAR) 100 MG tablet TAKE 1 TABLET BY MOUTH EVERY DAY 12/12/2024: Hold the morning of surgery    90 tablet 3    multivitamin (THERAGRAN) tablet Take 1 tablet by mouth once daily. 12/12/2024: Stop 7 days before surgery        VENTOLIN HFA 90 mcg/actuation inhaler Inhale 2 puffs into the lungs every 4 (four) hours as needed for Shortness of Breath or Wheezing. 12/12/2024: May Take if need on AM of surgery        acyclovir (ZOVIRAX) 400 MG tablet TAKE 1 TABLET BY MOUTH ONCE DAILY (Patient not taking: Reported on 11/3/2024)  90 tablet 1    citalopram (CELEXA) 20 MG tablet Take 20 mg by mouth once daily. (Patient not taking: Reported on 11/3/2024) 12/12/2024: Take with a sip of water on am of surgery.        esomeprazole magnesium (NEXIUM) 10 mg suspension Take 10 mg by mouth before breakfast. (Patient not taking: Reported on 11/3/2024) 12/12/2024: Take with a sip of water on am of surgery.          mupirocin (BACTROBAN) 2 % ointment Left earlobe  44 g 0    sumatriptan (IMITREX) 25 MG Tab Take 2 tablets (50 mg total) by mouth every 6 (six) hours as needed (migraine). 12/12/2024: May Take if need on AM of surgery   15 tablet 0     No facility-administered encounter medications on file as of 12/13/2024.  "      Review of patient's allergies indicates:  No Known Allergies    Physical Exam      Vital Signs  Pulse: (!) 111  Resp: 17  SpO2: 97 %  BP: 124/82  BP Location: Right arm  Patient Position: Sitting  Height and Weight  Height: 5' 7" (170.2 cm)  Weight: 80.7 kg (177 lb 14.6 oz)  BSA (Calculated - sq m): 1.95 sq meters  BMI (Calculated): 27.9  Weight in (lb) to have BMI = 25: 159.3    Physical Exam  Vitals and nursing note reviewed.   Constitutional:       General: He is not in acute distress.     Appearance: Normal appearance. He is well-developed. He is not ill-appearing.   HENT:      Head: Normocephalic and atraumatic.      Right Ear: External ear normal.      Left Ear: External ear normal.   Eyes:      Conjunctiva/sclera: Conjunctivae normal.      Pupils: Pupils are equal, round, and reactive to light.   Neck:      Thyroid: No thyromegaly.      Vascular: No JVD.      Trachea: No tracheal deviation.   Cardiovascular:      Rate and Rhythm: Normal rate and regular rhythm.      Heart sounds: Normal heart sounds.   Pulmonary:      Effort: Pulmonary effort is normal. No respiratory distress.      Breath sounds: Normal breath sounds.   Abdominal:      General: Bowel sounds are normal. There is no distension.      Palpations: Abdomen is soft.      Tenderness: There is no abdominal tenderness.   Musculoskeletal:         General: Normal range of motion.      Cervical back: Normal range of motion and neck supple.   Lymphadenopathy:      Cervical: No cervical adenopathy.   Skin:     General: Skin is warm and dry.      Coloration: Skin is not pale.      Findings: No erythema or rash.   Neurological:      General: No focal deficit present.      Mental Status: He is alert and oriented to person, place, and time.   Psychiatric:         Mood and Affect: Mood normal.         Behavior: Behavior normal.         Thought Content: Thought content normal.         Judgment: Judgment normal.          Laboratory:  CBC:  Lab Results " "  Component Value Date    WBC 3.25 (L) 01/05/2024    RBC 5.38 01/05/2024    HGB 16.3 01/05/2024    HCT 47.6 01/05/2024     01/05/2024    MCV 89 01/05/2024    MCH 30.3 01/05/2024    MCHC 34.2 01/05/2024     CMP:  Lab Results   Component Value Date    GLU 96 01/05/2024    CALCIUM 9.2 01/05/2024    ALBUMIN 3.8 01/05/2024    PROT 7.1 01/05/2024     01/05/2024    K 4.0 01/05/2024    CO2 28 01/05/2024     01/05/2024    BUN 17 01/05/2024    ALKPHOS 58 01/05/2024    ALT 36 01/05/2024    AST 29 01/05/2024    BILITOT 0.7 01/05/2024    BILITOT 0.5 02/17/2023     URINALYSIS:  No results found for: "COLORU", "CLARITYU", "SPECGRAV", "PHUR", "PROTEINUA", "GLUCOSEU", "BILIRUBINCON", "BLOODU", "WBCU", "RBCU", "BACTERIA", "MUCUS", "NITRITE", "LEUKOCYTESUR", "UROBILINOGEN", "HYALINECASTS"   LIPIDS:  Lab Results   Component Value Date    TSH 1.456 01/05/2024    TSH 0.691 04/19/2023    TSH 0.760 02/17/2023    HDL 45 01/05/2024    CHOL 192 01/05/2024    TRIG 132 01/05/2024    LDLCALC 120.6 01/05/2024    CHOLHDL 23.4 01/05/2024    NONHDLCHOL 147 01/05/2024    TOTALCHOLEST 4.3 01/05/2024     TSH:  Lab Results   Component Value Date    TSH 1.456 01/05/2024    TSH 0.691 04/19/2023    TSH 0.760 02/17/2023     A1C:  Lab Results   Component Value Date    HGBA1C 5.0 01/05/2024         Assessment/Plan     Felix Luis Manuel Jarred is a 44 y.o.male with:    Assessment & Plan    IMPRESSION:  - Evaluated patient for pre-operative clearance for upcoming left shoulder laparoscopic surgery with Dr. Samayoa  - Assessed overall health status, including history of asthma and recent ear lobe swelling  - Determined need for pre-operative labs, EKG, and chest XR to ensure fitness for surgery  - Considered antibiotic treatment for possible insect bite on ear lobe    ROTATOR CUFF TEAR AND SHOULDER OSTEOARTHRITIS:  - Explained purpose of pre-operative tests: checking for infection, anemia, electrolyte balance, blood sugar, liver and kidney " function, and blood consistency.  - Discussed importance of stopping blood-thinning medications before surgery.  - Felix to follow instructions provided by surgeon regarding medication management before surgery.  - EKG ordered.  - Pre-operative lab work ordered including complete blood count, comprehensive metabolic panel, and coagulation studies.    ASTHMA:  - Explained rationale for chest XR due to history of asthma and upcoming general anesthesia.  - Chest XR ordered.    SWELLING OF EAR LOBE:  - Felix to apply antibiotic ointment to swollen ear lobe as directed.  - Started topical antibiotic ointment for swollen ear lobe.    GASTRO-ESOPHAGEAL REFLUX DISEASE:  - Continued Nexium daily for reflux.    OTHER MEDICATIONS:  - Continued aciclovir at current dose.    FOLLOW-UP:  - Follow up for pre-operative appointment with surgical team on Monday.  - Follow up as scheduled for surgery with Dr. Samayoa.         Pre-op examination  -     CBC Auto Differential; Future; Expected date: 12/13/2024  -     Comprehensive Metabolic Panel; Future; Expected date: 12/13/2024  -     PROTIME-INR; Future; Expected date: 12/13/2024  -     IN OFFICE EKG 12-LEAD (to Muse)  -     X-Ray Chest 1 View; Future; Expected date: 12/13/2024    Gastroesophageal reflux disease without esophagitis  -     CBC Auto Differential; Future; Expected date: 12/13/2024  -     Comprehensive Metabolic Panel; Future; Expected date: 12/13/2024  -     PROTIME-INR; Future; Expected date: 12/13/2024  -     IN OFFICE EKG 12-LEAD (to Muse)  -     X-Ray Chest 1 View; Future; Expected date: 12/13/2024    Chronic left shoulder pain  -     CBC Auto Differential; Future; Expected date: 12/13/2024  -     Comprehensive Metabolic Panel; Future; Expected date: 12/13/2024  -     PROTIME-INR; Future; Expected date: 12/13/2024  -     IN OFFICE EKG 12-LEAD (to Muse)  -     X-Ray Chest 1 View; Future; Expected date: 12/13/2024    Mild intermittent asthma, unspecified whether  complicated  -     CBC Auto Differential; Future; Expected date: 12/13/2024  -     Comprehensive Metabolic Panel; Future; Expected date: 12/13/2024  -     PROTIME-INR; Future; Expected date: 12/13/2024  -     IN OFFICE EKG 12-LEAD (to Muse)  -     X-Ray Chest 1 View; Future; Expected date: 12/13/2024    Weakness of left upper extremity  -     CBC Auto Differential; Future; Expected date: 12/13/2024  -     Comprehensive Metabolic Panel; Future; Expected date: 12/13/2024  -     PROTIME-INR; Future; Expected date: 12/13/2024  -     IN OFFICE EKG 12-LEAD (to Muse)  -     X-Ray Chest 1 View; Future; Expected date: 12/13/2024    HTN (hypertension), benign  -     CBC Auto Differential; Future; Expected date: 12/13/2024  -     Comprehensive Metabolic Panel; Future; Expected date: 12/13/2024  -     PROTIME-INR; Future; Expected date: 12/13/2024  -     IN OFFICE EKG 12-LEAD (to Muse)  -     X-Ray Chest 1 View; Future; Expected date: 12/13/2024    Cellulitis of auricle of left ear  -     mupirocin (BACTROBAN) 2 % ointment; Left earlobe  Dispense: 44 g; Refill: 0         Health Maintenance Due   Topic Date Due    Hepatitis C Screening  Never done    HIV Screening  Never done    Colorectal Cancer Screening  Never done    Influenza Vaccine (1) Never done    COVID-19 Vaccine (1 - 2024-25 season) Never done          I spent 37 minutes on the day of this encounter for preparing for, evaluating, treating, and managing this patient.        -Continue current medications and maintain follow up with specialists.  Return to clinic as needed for any concerns   Follow up if symptoms worsen or fail to improve.     This note was generated with the assistance of ambient listening technology. Verbal consent was obtained by the patient and accompanying visitor(s) for the recording of patient appointment to facilitate this note. I attest to having reviewed and edited the generated note for accuracy, though some syntax or spelling errors may  persist. Please contact the author of this note for any clarification.         GAVIOTA Coats  [facility]

## 2024-12-16 ENCOUNTER — OFFICE VISIT (OUTPATIENT)
Dept: SPORTS MEDICINE | Facility: CLINIC | Age: 44
End: 2024-12-16
Payer: COMMERCIAL

## 2024-12-16 VITALS
WEIGHT: 176.38 LBS | SYSTOLIC BLOOD PRESSURE: 149 MMHG | BODY MASS INDEX: 27.62 KG/M2 | HEART RATE: 75 BPM | DIASTOLIC BLOOD PRESSURE: 96 MMHG

## 2024-12-16 DIAGNOSIS — S46.012A TRAUMATIC ROTATOR CUFF TEAR, LEFT, INITIAL ENCOUNTER: Primary | ICD-10-CM

## 2024-12-16 PROCEDURE — 99999 PR PBB SHADOW E&M-EST. PATIENT-LVL III: CPT | Mod: PBBFAC,,, | Performed by: PHYSICIAN ASSISTANT

## 2024-12-16 PROCEDURE — 99499 UNLISTED E&M SERVICE: CPT | Mod: S$GLB,,, | Performed by: PHYSICIAN ASSISTANT

## 2024-12-16 RX ORDER — DOCUSATE SODIUM 100 MG/1
100 CAPSULE, LIQUID FILLED ORAL 2 TIMES DAILY PRN
Qty: 20 CAPSULE | Refills: 0 | Status: SHIPPED | OUTPATIENT
Start: 2024-12-16

## 2024-12-16 RX ORDER — TRAMADOL HYDROCHLORIDE 50 MG/1
50-100 TABLET ORAL EVERY 6 HOURS PRN
Qty: 21 TABLET | Refills: 0 | Status: SHIPPED | OUTPATIENT
Start: 2024-12-16

## 2024-12-16 RX ORDER — SODIUM CHLORIDE 9 MG/ML
INJECTION, SOLUTION INTRAVENOUS CONTINUOUS
Status: CANCELLED | OUTPATIENT
Start: 2024-12-17

## 2024-12-16 RX ORDER — PREGABALIN 75 MG/1
75 CAPSULE ORAL
Status: CANCELLED | OUTPATIENT
Start: 2024-12-17 | End: 2024-12-17

## 2024-12-16 RX ORDER — PROMETHAZINE HYDROCHLORIDE 25 MG/1
25 TABLET ORAL EVERY 6 HOURS PRN
Qty: 8 TABLET | Refills: 0 | Status: SHIPPED | OUTPATIENT
Start: 2024-12-16

## 2024-12-16 RX ORDER — OXYCODONE AND ACETAMINOPHEN 10; 325 MG/1; MG/1
TABLET ORAL
Qty: 21 TABLET | Refills: 0 | Status: SHIPPED | OUTPATIENT
Start: 2024-12-16

## 2024-12-16 RX ORDER — CLINDAMYCIN PHOSPHATE 900 MG/50ML
900 INJECTION, SOLUTION INTRAVENOUS
Status: CANCELLED | OUTPATIENT
Start: 2024-12-16

## 2024-12-16 RX ORDER — NAPROXEN SODIUM 220 MG/1
81 TABLET, FILM COATED ORAL DAILY
Qty: 28 TABLET | Refills: 0 | Status: SHIPPED | OUTPATIENT
Start: 2024-12-16 | End: 2025-12-16

## 2024-12-16 RX ORDER — OXYCODONE HCL 10 MG/1
10 TABLET, FILM COATED, EXTENDED RELEASE ORAL
Status: CANCELLED | OUTPATIENT
Start: 2024-12-17 | End: 2024-12-17

## 2024-12-17 NOTE — H&P
Felix Garciafahadmonika  is here for a completion of his perioperative paperwork. he  Is scheduled to undergo     left              a. Shoulder arthroscopic rotator cuff repair with Cuffmend              b. Shoulder arthroscopic SAD              c. Shoulder arthroscopic extensive debridement              d. Shoulder arthroscopic biceps tenodesis (vs. subpect tenodesis)              e. Shoulder arthroscopic possible microfracture              f.  Shoulder open reduction internal fixation greater tuberosity    on 12/19/24.      He is a healthy individual and does need clearance for this procedure which he has received from internal med.     PAST MEDICAL HISTORY:   Past Medical History:   Diagnosis Date    GERD (gastroesophageal reflux disease)     HTN (hypertension), benign 2/10/2023     PAST SURGICAL HISTORY:   Past Surgical History:   Procedure Laterality Date    HAND SURGERY      REPAIR,TENDON,DISTAL PROXIMAL Left 12/8/2022    Procedure: REPAIR,TENDON,DISTAL PROXIMAL;  Surgeon: Jossy Samayoa MD;  Location: [facility];  Service: Orthopedics;  Laterality: Left;  Distal bicep repair    VASECTOMY       FAMILY HISTORY:   Family History   Problem Relation Name Age of Onset    Colon cancer Neg Hx      Esophageal cancer Neg Hx      Stomach cancer Neg Hx      Rectal cancer Neg Hx       SOCIAL HISTORY:   Social History     Socioeconomic History    Marital status:    Tobacco Use    Smoking status: Never    Smokeless tobacco: Never   Substance and Sexual Activity    Alcohol use: Yes     Comment: occasionally    Drug use: No       MEDICATIONS:   Current Outpatient Medications:     amLODIPine (NORVASC) 10 MG tablet, Take 1 tablet (10 mg total) by mouth once daily., Disp: 90 tablet, Rfl: 3    ibuprofen (ADVIL,MOTRIN) 600 MG tablet, Take 1 tablet (600 mg total) by mouth every 8 (eight) hours as needed for Pain., Disp: 20 tablet, Rfl: 0    losartan (COZAAR) 100 MG tablet, TAKE 1 TABLET BY MOUTH EVERY DAY, Disp: 90 tablet, Rfl:  3    multivitamin (THERAGRAN) tablet, Take 1 tablet by mouth once daily., Disp: , Rfl:     mupirocin (BACTROBAN) 2 % ointment, Left earlobe, Disp: 44 g, Rfl: 0    VENTOLIN HFA 90 mcg/actuation inhaler, Inhale 2 puffs into the lungs every 4 (four) hours as needed for Shortness of Breath or Wheezing., Disp: , Rfl:     acyclovir (ZOVIRAX) 400 MG tablet, TAKE 1 TABLET BY MOUTH ONCE DAILY (Patient not taking: Reported on 12/16/2024), Disp: 90 tablet, Rfl: 1    aspirin 81 MG Chew, Take 1 tablet (81 mg total) by mouth once daily. For 4 weeks., Disp: 28 tablet, Rfl: 0    citalopram (CELEXA) 20 MG tablet, Take 20 mg by mouth once daily. (Patient not taking: Reported on 12/16/2024), Disp: , Rfl:     docusate sodium (COLACE) 100 MG capsule, Take 1 capsule (100 mg total) by mouth 2 (two) times daily as needed for Constipation., Disp: 20 capsule, Rfl: 0    esomeprazole magnesium (NEXIUM) 10 mg suspension, Take 10 mg by mouth before breakfast. (Patient not taking: Reported on 12/16/2024), Disp: , Rfl:     oxyCODONE-acetaminophen (PERCOCET)  mg per tablet, Take 1 tablet by mouth every 4-6 hours as needed for pain. Take stool softener with this medication., Disp: 21 tablet, Rfl: 0    promethazine (PHENERGAN) 25 MG tablet, Take 1 tablet (25 mg total) by mouth every 6 (six) hours as needed for Nausea., Disp: 8 tablet, Rfl: 0    sumatriptan (IMITREX) 25 MG Tab, Take 2 tablets (50 mg total) by mouth every 6 (six) hours as needed (migraine)., Disp: 15 tablet, Rfl: 0    traMADoL (ULTRAM) 50 mg tablet, Take 1-2 tablets ( mg total) by mouth every 6 (six) hours as needed for Pain., Disp: 21 tablet, Rfl: 0  ALLERGIES: Review of patient's allergies indicates:  No Known Allergies    VITAL SIGNS: BP (!) 149/96   Pulse 75   Wt 80 kg (176 lb 5.9 oz)   BMI 27.62 kg/m²      Risks, indications and benefits of the surgical procedure were discussed with the patient. All questions with regard to surgery, rehab, expected return to  functional activities, activities of daily living and recreational endeavors were answered to his satisfaction.    It was explained to the patient that there may be an increase in surgical risks if the patient has certain co-morbidities such as but not limited to: Obesity, Cardiovascular issues (CHF, CAD, Arrhythmias), chronic pulmonary issues, previous or current neurovascular/neurological issues, previous strokes, diabetes mellitus, previous wound healing issues, previous wound or skin infections, PVD, clotting disorders, if the patient uses chronic steroids, if the patient takes or has immune compromising medications or diseases, or has previously or currently used tobacco products.     The patient verbalized that he/she does not have any additional clotting, bleeding, or blood disorders, other than what is list in her chart on today's review.     Then a brief history and physical exam were performed.    Review of Systems   Constitution: Negative. Negative for chills, fever and night sweats.   HENT: Negative for congestion and headaches.    Eyes: Negative for blurred vision, left vision loss and right vision loss.   Cardiovascular: Negative for chest pain and syncope.   Respiratory: Negative for cough and shortness of breath.    Endocrine: Negative for polydipsia, polyphagia and polyuria.   Hematologic/Lymphatic: Negative for bleeding problem. Does not bruise/bleed easily.   Skin: Negative for dry skin, itching and rash.   Musculoskeletal: Negative for falls and muscle weakness.   Gastrointestinal: Negative for abdominal pain and bowel incontinence.   Genitourinary: Negative for bladder incontinence and nocturia.   Neurological: Negative for disturbances in coordination, loss of balance and seizures.   Psychiatric/Behavioral: Negative for depression. The patient does not have insomnia.    Allergic/Immunologic: Negative for hives and persistent infections.     PHYSICAL EXAM:  GEN: A&Ox3, WD WN NAD  HEENT:  WNL  CHEST: CTAB, no W/R/R  HEART: RRR, no M/R/G  ABD: Soft, NT ND, BS x4 QUADS  MS; See Epic  NEURO: CN II-XII intact       The surgical consent was then reviewed with the patient, who agreed with all the contents of the consent form and it was signed. he was then given the surgery packet to bring with him to surgery center for the anesthesia portion of his perioperative paperwork (if needed)   For all physicians except for Dr. Grove, we will email and possibly fax the consent forms and booking sheets to [facility].    The patient was given the opportunity to ask questions about the surgical plan and consent form, and once no other questions were asked, I proceeded with the pre-op appointment.    PHYSICAL THERAPY:  He was also instructed regarding physical therapy and will begin on 4 weeks post-op He was given a copy of the original prescription to schedule. Another copy of this prescription was also faxed to [facility].    POST OP CARE:instructions were reviewed including care of the wound and dressing after surgery and when he can shower. Patient was told not sleep or lay on there surgical extremity following surgery as this could cause repair damage, tissue damage, or nerve injury.    An extensive amount of time was spent on discussion of the following information based on what type of surgery the patient was having. Patient expressed understanding of the material below:    Shoulder surgery or upper extremity surgery requiring post-op sling:  It was explained to the patient that they should remove their arm from the sling approximately 6 times per day to do full elbow ROM (flexion and extension) and full supination and pronation of the elbow for approximately 5 minutes at a time to help prevent elbow stiffness, nerve pain or problems, or nerve injury. They were told to contact us if they begin having numbness and tingling of there surgical extremity that persists longer then 1 day without  relief.     Extremity surgery requiring a splint:   It was explained to patient on how to properly elevated position there extremity to prevent pressure ulcers from occurring. I made sure that the patient understood that that surgical site may be numb following surgery and prevent them from feeling pressure pain that they would normal feel if a pressure injury was occurring. Pressure ulcers and there causes were discussed with the patient today.     Post-operative splint:  It was explain to the patient that they can contact us at anytime if they feel that there is a problem with their splint or under their splint that needs evaluation. If there is concern, questions, or discomfort with the splint then they can present to either our clinic or the [facility] ED for removal, evaluation, and replacement of the splint.    CRUTCHES OR WALKER: It was explained to the patient that if they are having a lower extremity surgery that they will require either a walker or crutches to ambulate safely with after surgery. It was explained that a cane or other assistive devices are not sufficient to safely ambulate with after surgery. I explained to the patient that I will place an order for them to receive either crutches or a walker after surgery to go home with. It was explained that if they have crutches or a walker at home already, that they are REQUIRED to bring them to the hospital on the day of surgery. It was explained that if they do not have them at the hospital on the day of surgery that they WILL be provided a new pair or crutches or a walker to go home with to ensure ambulation will be safe if the patient needs to stop somewhere on the way home.      If the patient's mobility limitation cannot be sufficiently resolved by the use of crutches then it is necessary for the patient's functional mobility deficit to be sufficiently resolved with the use of a (Rolling Walker or Walker). Patient's mobility limitation  significantly impairs their ability to participate in one of more activities of daily living. The use of a (Rolling Walker or Walker) will significantly improve the patient's ability to participate in [facility]ADLS and the patient will use it on regular basis in the home.      PAIN MANAGEMENT: Felix Stern was also given a pain management regime, which includes the option of getting a TENS unit given to him by [facility] (if patient chooses) along with the education required for its use. He was also instructed regarding the Polar ice unit or gel ice packs (chosen by patient) that will be in place after surgery and his postoperative pain medications.     Patient understands that Polar Ice machine has to be bought today if they want it. It cannot be bought on day of surgery at surgery center.     PAIN MEDICATION:  Percocet 10/325mg 1 po q 4-6 hours prn pain  Ultram 50 mg Take 1-2 p.o. q.6 hours p.r.n. breakthrough pain,   Phenergan 25 mg one p.o. q.6 hours p.r.n. nausea and vomiting.    DVT prophylaxis was discussed with the patient today including risk factors for developing DVTs and history of DVTs. The patient was asked if any specific recommendations were given from the doctor/s that did pre-operative surgical clearance. The patient was then given an education sheet about DVTs and PE with warning signs and symptoms of both and steps to take if they suspect either of these.    This along with the Modified Caprini risk assessment model for VTE in general surgical patients was used to determine the patient's DVT risk.     From: Laure VILLALTA, Olman DA, Lou SM, et al. Prevention of VTE in nonorthopedic surgical patients: antithrombotic therapy and prevention of thrombosis, 9th ed: American College [employer] evidence-based clinical practical guidelines. Chest 2012; 141:e227S. Copyright © 2012. Reproduced with permission from the American College of Chest Physicians.    The below listed DVT prophylaxis  regimen along with bilateral ABRAHAM compression stockings will be used post-op. Length of treatment has been determined to be 10-42 days post-op by the above noted Caprini assessment model.     The patient was instructed to buy and take:  Aspirin 81mg QD x 4 weeks for DVT prophylaxis starting on the morning after surgery.  Patient will also use bilateral TEDs on lower extremities, SCDs during surgery, and early ambulation post-op. If the patient was previously taking 81mg baby aspirin, they were told to not take it starting 5 days prior to surgery and to restart the 81mg aspirin after surgery.       Patient was also told to buy over the counter Prilosec medication if needed and take it once daily for GI protection as long as they are taking NSAIDs or Aspirin.   Patient verbally denies history of blood clots or DVTs when asked today.   Patient denies history of seizures.      The patient was told that narcotic pain medications may make them drowsy and instructions were given to not sign legal documents, drive or operate heavy machinery, cars, or equipment while under the influence of narcotic medications. The patient was told and understands that narcotic pain medications should only be used as needed to control pain and that other options of pain control include TENs unit and ice packs/unit.     As there were no other questions to be asked, he was given my business card along with Jossy Samayoa MD business card if he has any questions or concerns prior to surgery or in the postop period.

## 2024-12-17 NOTE — H&P (VIEW-ONLY)
Felix Garciafahadmonika  is here for a completion of his perioperative paperwork. he  Is scheduled to undergo     left              a. Shoulder arthroscopic rotator cuff repair with Cuffmend              b. Shoulder arthroscopic SAD              c. Shoulder arthroscopic extensive debridement              d. Shoulder arthroscopic biceps tenodesis (vs. subpect tenodesis)              e. Shoulder arthroscopic possible microfracture              f.  Shoulder open reduction internal fixation greater tuberosity    on 12/19/24.      He is a healthy individual and does need clearance for this procedure which he has received from internal med.     PAST MEDICAL HISTORY:   Past Medical History:   Diagnosis Date    GERD (gastroesophageal reflux disease)     HTN (hypertension), benign 2/10/2023     PAST SURGICAL HISTORY:   Past Surgical History:   Procedure Laterality Date    HAND SURGERY      REPAIR,TENDON,DISTAL PROXIMAL Left 12/8/2022    Procedure: REPAIR,TENDON,DISTAL PROXIMAL;  Surgeon: Jossy Samayoa MD;  Location: [facility];  Service: Orthopedics;  Laterality: Left;  Distal bicep repair    VASECTOMY       FAMILY HISTORY:   Family History   Problem Relation Name Age of Onset    Colon cancer Neg Hx      Esophageal cancer Neg Hx      Stomach cancer Neg Hx      Rectal cancer Neg Hx       SOCIAL HISTORY:   Social History     Socioeconomic History    Marital status:    Tobacco Use    Smoking status: Never    Smokeless tobacco: Never   Substance and Sexual Activity    Alcohol use: Yes     Comment: occasionally    Drug use: No       MEDICATIONS:   Current Outpatient Medications:     amLODIPine (NORVASC) 10 MG tablet, Take 1 tablet (10 mg total) by mouth once daily., Disp: 90 tablet, Rfl: 3    ibuprofen (ADVIL,MOTRIN) 600 MG tablet, Take 1 tablet (600 mg total) by mouth every 8 (eight) hours as needed for Pain., Disp: 20 tablet, Rfl: 0    losartan (COZAAR) 100 MG tablet, TAKE 1 TABLET BY MOUTH EVERY DAY, Disp: 90 tablet, Rfl:  3    multivitamin (THERAGRAN) tablet, Take 1 tablet by mouth once daily., Disp: , Rfl:     mupirocin (BACTROBAN) 2 % ointment, Left earlobe, Disp: 44 g, Rfl: 0    VENTOLIN HFA 90 mcg/actuation inhaler, Inhale 2 puffs into the lungs every 4 (four) hours as needed for Shortness of Breath or Wheezing., Disp: , Rfl:     acyclovir (ZOVIRAX) 400 MG tablet, TAKE 1 TABLET BY MOUTH ONCE DAILY (Patient not taking: Reported on 12/16/2024), Disp: 90 tablet, Rfl: 1    aspirin 81 MG Chew, Take 1 tablet (81 mg total) by mouth once daily. For 4 weeks., Disp: 28 tablet, Rfl: 0    citalopram (CELEXA) 20 MG tablet, Take 20 mg by mouth once daily. (Patient not taking: Reported on 12/16/2024), Disp: , Rfl:     docusate sodium (COLACE) 100 MG capsule, Take 1 capsule (100 mg total) by mouth 2 (two) times daily as needed for Constipation., Disp: 20 capsule, Rfl: 0    esomeprazole magnesium (NEXIUM) 10 mg suspension, Take 10 mg by mouth before breakfast. (Patient not taking: Reported on 12/16/2024), Disp: , Rfl:     oxyCODONE-acetaminophen (PERCOCET)  mg per tablet, Take 1 tablet by mouth every 4-6 hours as needed for pain. Take stool softener with this medication., Disp: 21 tablet, Rfl: 0    promethazine (PHENERGAN) 25 MG tablet, Take 1 tablet (25 mg total) by mouth every 6 (six) hours as needed for Nausea., Disp: 8 tablet, Rfl: 0    sumatriptan (IMITREX) 25 MG Tab, Take 2 tablets (50 mg total) by mouth every 6 (six) hours as needed (migraine)., Disp: 15 tablet, Rfl: 0    traMADoL (ULTRAM) 50 mg tablet, Take 1-2 tablets ( mg total) by mouth every 6 (six) hours as needed for Pain., Disp: 21 tablet, Rfl: 0  ALLERGIES: Review of patient's allergies indicates:  No Known Allergies    VITAL SIGNS: BP (!) 149/96   Pulse 75   Wt 80 kg (176 lb 5.9 oz)   BMI 27.62 kg/m²      Risks, indications and benefits of the surgical procedure were discussed with the patient. All questions with regard to surgery, rehab, expected return to  functional activities, activities of daily living and recreational endeavors were answered to his satisfaction.    It was explained to the patient that there may be an increase in surgical risks if the patient has certain co-morbidities such as but not limited to: Obesity, Cardiovascular issues (CHF, CAD, Arrhythmias), chronic pulmonary issues, previous or current neurovascular/neurological issues, previous strokes, diabetes mellitus, previous wound healing issues, previous wound or skin infections, PVD, clotting disorders, if the patient uses chronic steroids, if the patient takes or has immune compromising medications or diseases, or has previously or currently used tobacco products.     The patient verbalized that he/she does not have any additional clotting, bleeding, or blood disorders, other than what is list in her chart on today's review.     Then a brief history and physical exam were performed.    Review of Systems   Constitution: Negative. Negative for chills, fever and night sweats.   HENT: Negative for congestion and headaches.    Eyes: Negative for blurred vision, left vision loss and right vision loss.   Cardiovascular: Negative for chest pain and syncope.   Respiratory: Negative for cough and shortness of breath.    Endocrine: Negative for polydipsia, polyphagia and polyuria.   Hematologic/Lymphatic: Negative for bleeding problem. Does not bruise/bleed easily.   Skin: Negative for dry skin, itching and rash.   Musculoskeletal: Negative for falls and muscle weakness.   Gastrointestinal: Negative for abdominal pain and bowel incontinence.   Genitourinary: Negative for bladder incontinence and nocturia.   Neurological: Negative for disturbances in coordination, loss of balance and seizures.   Psychiatric/Behavioral: Negative for depression. The patient does not have insomnia.    Allergic/Immunologic: Negative for hives and persistent infections.     PHYSICAL EXAM:  GEN: A&Ox3, WD WN NAD  HEENT:  WNL  CHEST: CTAB, no W/R/R  HEART: RRR, no M/R/G  ABD: Soft, NT ND, BS x4 QUADS  MS; See Epic  NEURO: CN II-XII intact       The surgical consent was then reviewed with the patient, who agreed with all the contents of the consent form and it was signed. he was then given the surgery packet to bring with him to surgery center for the anesthesia portion of his perioperative paperwork (if needed)   For all physicians except for Dr. Grove, we will email and possibly fax the consent forms and booking sheets to [facility].    The patient was given the opportunity to ask questions about the surgical plan and consent form, and once no other questions were asked, I proceeded with the pre-op appointment.    PHYSICAL THERAPY:  He was also instructed regarding physical therapy and will begin on 4 weeks post-op He was given a copy of the original prescription to schedule. Another copy of this prescription was also faxed to [facility].    POST OP CARE:instructions were reviewed including care of the wound and dressing after surgery and when he can shower. Patient was told not sleep or lay on there surgical extremity following surgery as this could cause repair damage, tissue damage, or nerve injury.    An extensive amount of time was spent on discussion of the following information based on what type of surgery the patient was having. Patient expressed understanding of the material below:    Shoulder surgery or upper extremity surgery requiring post-op sling:  It was explained to the patient that they should remove their arm from the sling approximately 6 times per day to do full elbow ROM (flexion and extension) and full supination and pronation of the elbow for approximately 5 minutes at a time to help prevent elbow stiffness, nerve pain or problems, or nerve injury. They were told to contact us if they begin having numbness and tingling of there surgical extremity that persists longer then 1 day without  relief.     Extremity surgery requiring a splint:   It was explained to patient on how to properly elevated position there extremity to prevent pressure ulcers from occurring. I made sure that the patient understood that that surgical site may be numb following surgery and prevent them from feeling pressure pain that they would normal feel if a pressure injury was occurring. Pressure ulcers and there causes were discussed with the patient today.     Post-operative splint:  It was explain to the patient that they can contact us at anytime if they feel that there is a problem with their splint or under their splint that needs evaluation. If there is concern, questions, or discomfort with the splint then they can present to either our clinic or the [facility] ED for removal, evaluation, and replacement of the splint.    CRUTCHES OR WALKER: It was explained to the patient that if they are having a lower extremity surgery that they will require either a walker or crutches to ambulate safely with after surgery. It was explained that a cane or other assistive devices are not sufficient to safely ambulate with after surgery. I explained to the patient that I will place an order for them to receive either crutches or a walker after surgery to go home with. It was explained that if they have crutches or a walker at home already, that they are REQUIRED to bring them to the hospital on the day of surgery. It was explained that if they do not have them at the hospital on the day of surgery that they WILL be provided a new pair or crutches or a walker to go home with to ensure ambulation will be safe if the patient needs to stop somewhere on the way home.      If the patient's mobility limitation cannot be sufficiently resolved by the use of crutches then it is necessary for the patient's functional mobility deficit to be sufficiently resolved with the use of a (Rolling Walker or Walker). Patient's mobility limitation  significantly impairs their ability to participate in one of more activities of daily living. The use of a (Rolling Walker or Walker) will significantly improve the patient's ability to participate in MRADLS and the patient will use it on regular basis in the home.      PAIN MANAGEMENT: Felix Stern was also given a pain management regime, which includes the option of getting a TENS unit given to him by [facility] (if patient chooses) along with the education required for its use. He was also instructed regarding the Polar ice unit or gel ice packs (chosen by patient) that will be in place after surgery and his postoperative pain medications.     Patient understands that Polar Ice machine has to be bought today if they want it. It cannot be bought on day of surgery at surgery center.     PAIN MEDICATION:  Percocet 10/325mg 1 po q 4-6 hours prn pain  Ultram 50 mg Take 1-2 p.o. q.6 hours p.r.n. breakthrough pain,   Phenergan 25 mg one p.o. q.6 hours p.r.n. nausea and vomiting.    DVT prophylaxis was discussed with the patient today including risk factors for developing DVTs and history of DVTs. The patient was asked if any specific recommendations were given from the doctor/s that did pre-operative surgical clearance. The patient was then given an education sheet about DVTs and PE with warning signs and symptoms of both and steps to take if they suspect either of these.    This along with the Modified Caprini risk assessment model for VTE in general surgical patients was used to determine the patient's DVT risk.     From: Laure VILLALTA, Olman DA, Lou SM, et al. Prevention of VTE in nonorthopedic surgical patients: antithrombotic therapy and prevention of thrombosis, 9th ed: American College of [employer]icians evidence-based clinical practical guidelines. Chest 2012; 141:e227S. Copyright © 2012. Reproduced with permission from the American College of Chest Physicians.    The below listed DVT prophylaxis  regimen along with bilateral ABRAHAM compression stockings will be used post-op. Length of treatment has been determined to be 10-42 days post-op by the above noted Caprini assessment model.     The patient was instructed to buy and take:  Aspirin 81mg QD x 4 weeks for DVT prophylaxis starting on the morning after surgery.  Patient will also use bilateral TEDs on lower extremities, SCDs during surgery, and early ambulation post-op. If the patient was previously taking 81mg baby aspirin, they were told to not take it starting 5 days prior to surgery and to restart the 81mg aspirin after surgery.       Patient was also told to buy over the counter Prilosec medication if needed and take it once daily for GI protection as long as they are taking NSAIDs or Aspirin.   Patient verbally denies history of blood clots or DVTs when asked today.   Patient denies history of seizures.      The patient was told that narcotic pain medications may make them drowsy and instructions were given to not sign legal documents, drive or operate heavy machinery, cars, or equipment while under the influence of narcotic medications. The patient was told and understands that narcotic pain medications should only be used as needed to control pain and that other options of pain control include TENs unit and ice packs/unit.     As there were no other questions to be asked, he was given my business card along with Jossy Samayoa MD business card if he has any questions or concerns prior to surgery or in the postop period.

## 2024-12-18 ENCOUNTER — TELEPHONE (OUTPATIENT)
Dept: SPORTS MEDICINE | Facility: CLINIC | Age: 44
End: 2024-12-18
Payer: COMMERCIAL

## 2024-12-18 NOTE — TELEPHONE ENCOUNTER
Called the pt. Gave him arrival time at 9:30 am. Pt is aware of the location and how long to fast prior.

## 2024-12-19 ENCOUNTER — HOSPITAL ENCOUNTER (OUTPATIENT)
Facility: HOSPITAL | Age: 44
Discharge: HOME | End: 2024-12-19
Attending: ORTHOPAEDIC SURGERY | Admitting: ORTHOPAEDIC SURGERY
Payer: COMMERCIAL

## 2024-12-19 ENCOUNTER — TELEPHONE (OUTPATIENT)
Dept: SPORTS MEDICINE | Facility: CLINIC | Age: 44
End: 2024-12-19
Payer: COMMERCIAL

## 2024-12-19 ENCOUNTER — ANESTHESIA EVENT (OUTPATIENT)
Dept: SURGERY | Facility: HOSPITAL | Age: 44
End: 2024-12-19
Payer: COMMERCIAL

## 2024-12-19 ENCOUNTER — ANESTHESIA (OUTPATIENT)
Dept: SURGERY | Facility: HOSPITAL | Age: 44
End: 2024-12-19
Payer: COMMERCIAL

## 2024-12-19 VITALS
TEMPERATURE: 98 F | HEART RATE: 86 BPM | RESPIRATION RATE: 18 BRPM | BODY MASS INDEX: 27.62 KG/M2 | DIASTOLIC BLOOD PRESSURE: 100 MMHG | WEIGHT: 176 LBS | OXYGEN SATURATION: 90 % | HEIGHT: 67 IN | SYSTOLIC BLOOD PRESSURE: 139 MMHG

## 2024-12-19 DIAGNOSIS — S46.012A TRAUMATIC ROTATOR CUFF TEAR, LEFT, INITIAL ENCOUNTER: Primary | ICD-10-CM

## 2024-12-19 DIAGNOSIS — S46.012A TRAUMATIC ROTATOR CUFF TEAR, LEFT, INITIAL ENCOUNTER: ICD-10-CM

## 2024-12-19 PROCEDURE — 25000003 PHARM REV CODE 250: Performed by: NURSE ANESTHETIST, CERTIFIED REGISTERED

## 2024-12-19 PROCEDURE — 63600175 PHARM REV CODE 636 W HCPCS: Performed by: ORTHOPAEDIC SURGERY

## 2024-12-19 PROCEDURE — 71000033 HC RECOVERY, INTIAL HOUR: Performed by: ORTHOPAEDIC SURGERY

## 2024-12-19 PROCEDURE — 36000711: Performed by: ORTHOPAEDIC SURGERY

## 2024-12-19 PROCEDURE — C1713 ANCHOR/SCREW BN/BN,TIS/BN: HCPCS | Performed by: ORTHOPAEDIC SURGERY

## 2024-12-19 PROCEDURE — 37000009 HC ANESTHESIA EA ADD 15 MINS: Performed by: ORTHOPAEDIC SURGERY

## 2024-12-19 PROCEDURE — 63600175 PHARM REV CODE 636 W HCPCS: Mod: JZ,JG | Performed by: PHYSICIAN ASSISTANT

## 2024-12-19 PROCEDURE — 37000008 HC ANESTHESIA 1ST 15 MINUTES: Performed by: ORTHOPAEDIC SURGERY

## 2024-12-19 PROCEDURE — 94761 N-INVAS EAR/PLS OXIMETRY MLT: CPT

## 2024-12-19 PROCEDURE — 29823 SHO ARTHRS SRG XTNSV DBRDMT: CPT | Mod: 51,LT,, | Performed by: ORTHOPAEDIC SURGERY

## 2024-12-19 PROCEDURE — 29828 SHO ARTHRS SRG BICP TENODSIS: CPT | Mod: AS,51,LT, | Performed by: PHYSICIAN ASSISTANT

## 2024-12-19 PROCEDURE — 25000003 PHARM REV CODE 250: Performed by: ORTHOPAEDIC SURGERY

## 2024-12-19 PROCEDURE — 25000003 PHARM REV CODE 250: Performed by: STUDENT IN AN ORGANIZED HEALTH CARE EDUCATION/TRAINING PROGRAM

## 2024-12-19 PROCEDURE — 29823 SHO ARTHRS SRG XTNSV DBRDMT: CPT | Mod: AS,51,LT, | Performed by: PHYSICIAN ASSISTANT

## 2024-12-19 PROCEDURE — 99900035 HC TECH TIME PER 15 MIN (STAT)

## 2024-12-19 PROCEDURE — 29827 SHO ARTHRS SRG RT8TR CUF RPR: CPT | Mod: 22,LT,, | Performed by: ORTHOPAEDIC SURGERY

## 2024-12-19 PROCEDURE — 29827 SHO ARTHRS SRG RT8TR CUF RPR: CPT | Mod: AS,LT,, | Performed by: PHYSICIAN ASSISTANT

## 2024-12-19 PROCEDURE — 71000015 HC POSTOP RECOV 1ST HR: Performed by: ORTHOPAEDIC SURGERY

## 2024-12-19 PROCEDURE — 63600175 PHARM REV CODE 636 W HCPCS: Performed by: STUDENT IN AN ORGANIZED HEALTH CARE EDUCATION/TRAINING PROGRAM

## 2024-12-19 PROCEDURE — 63600175 PHARM REV CODE 636 W HCPCS: Performed by: NURSE ANESTHETIST, CERTIFIED REGISTERED

## 2024-12-19 PROCEDURE — 27201423 OPTIME MED/SURG SUP & DEVICES STERILE SUPPLY: Performed by: ORTHOPAEDIC SURGERY

## 2024-12-19 PROCEDURE — 36000710: Performed by: ORTHOPAEDIC SURGERY

## 2024-12-19 PROCEDURE — 29828 SHO ARTHRS SRG BICP TENODSIS: CPT | Mod: 51,LT,, | Performed by: ORTHOPAEDIC SURGERY

## 2024-12-19 DEVICE — SWIVELOCK, SP BC KL 4.75MM
Type: IMPLANTABLE DEVICE | Site: SHOULDER | Status: FUNCTIONAL
Brand: ARTHREX®

## 2024-12-19 DEVICE — 5.5MM BC CORKSCREW FT W/ SUTURETAPE
Type: IMPLANTABLE DEVICE | Site: SHOULDER | Status: FUNCTIONAL
Brand: ARTHREX®

## 2024-12-19 DEVICE — LNT IMPLANT SYSTEM, 4.75 BC SWIVELOCK
Type: IMPLANTABLE DEVICE | Site: SHOULDER | Status: FUNCTIONAL
Brand: ARTHREX®

## 2024-12-19 RX ORDER — KETOROLAC TROMETHAMINE 30 MG/ML
INJECTION, SOLUTION INTRAMUSCULAR; INTRAVENOUS
Status: DISCONTINUED | OUTPATIENT
Start: 2024-12-19 | End: 2024-12-19 | Stop reason: HOSPADM

## 2024-12-19 RX ORDER — PREGABALIN 75 MG/1
75 CAPSULE ORAL ONCE
Status: DISCONTINUED | OUTPATIENT
Start: 2024-12-19 | End: 2024-12-19 | Stop reason: HOSPADM

## 2024-12-19 RX ORDER — OXYCODONE HCL 10 MG/1
10 TABLET, FILM COATED, EXTENDED RELEASE ORAL
Status: COMPLETED | OUTPATIENT
Start: 2024-12-19 | End: 2024-12-19

## 2024-12-19 RX ORDER — PHENYLEPHRINE HYDROCHLORIDE 10 MG/ML
INJECTION INTRAVENOUS
Status: DISCONTINUED | OUTPATIENT
Start: 2024-12-19 | End: 2024-12-19

## 2024-12-19 RX ORDER — OXYCODONE HCL 10 MG/1
10 TABLET, FILM COATED, EXTENDED RELEASE ORAL
Status: DISCONTINUED | OUTPATIENT
Start: 2024-12-20 | End: 2024-12-19

## 2024-12-19 RX ORDER — FAMOTIDINE 10 MG/ML
INJECTION INTRAVENOUS
Status: DISCONTINUED | OUTPATIENT
Start: 2024-12-19 | End: 2024-12-19

## 2024-12-19 RX ORDER — PREGABALIN 75 MG/1
75 CAPSULE ORAL
Status: DISCONTINUED | OUTPATIENT
Start: 2024-12-20 | End: 2024-12-19

## 2024-12-19 RX ORDER — LABETALOL HYDROCHLORIDE 5 MG/ML
10 INJECTION, SOLUTION INTRAVENOUS ONCE
Status: COMPLETED | OUTPATIENT
Start: 2024-12-19 | End: 2024-12-19

## 2024-12-19 RX ORDER — LIDOCAINE HYDROCHLORIDE 20 MG/ML
INJECTION INTRAVENOUS
Status: DISCONTINUED | OUTPATIENT
Start: 2024-12-19 | End: 2024-12-19

## 2024-12-19 RX ORDER — EPINEPHRINE 1 MG/ML
INJECTION, SOLUTION, CONCENTRATE INTRAVENOUS
Status: DISCONTINUED | OUTPATIENT
Start: 2024-12-19 | End: 2024-12-19 | Stop reason: HOSPADM

## 2024-12-19 RX ORDER — MORPHINE SULFATE 2 MG/ML
2 INJECTION, SOLUTION INTRAMUSCULAR; INTRAVENOUS EVERY 10 MIN PRN
Status: DISCONTINUED | OUTPATIENT
Start: 2024-12-19 | End: 2024-12-19 | Stop reason: HOSPADM

## 2024-12-19 RX ORDER — ROPIVACAINE HYDROCHLORIDE 5 MG/ML
INJECTION, SOLUTION EPIDURAL; INFILTRATION; PERINEURAL
Status: DISCONTINUED | OUTPATIENT
Start: 2024-12-19 | End: 2024-12-19 | Stop reason: HOSPADM

## 2024-12-19 RX ORDER — HALOPERIDOL 5 MG/ML
0.5 INJECTION INTRAMUSCULAR EVERY 10 MIN PRN
Status: DISCONTINUED | OUTPATIENT
Start: 2024-12-19 | End: 2024-12-19 | Stop reason: HOSPADM

## 2024-12-19 RX ORDER — TRAMADOL HYDROCHLORIDE 50 MG/1
100 TABLET ORAL EVERY 6 HOURS PRN
Status: DISCONTINUED | OUTPATIENT
Start: 2024-12-19 | End: 2024-12-19 | Stop reason: HOSPADM

## 2024-12-19 RX ORDER — ROCURONIUM BROMIDE 10 MG/ML
INJECTION, SOLUTION INTRAVENOUS
Status: DISCONTINUED | OUTPATIENT
Start: 2024-12-19 | End: 2024-12-19

## 2024-12-19 RX ORDER — FENTANYL CITRATE 50 UG/ML
INJECTION, SOLUTION INTRAMUSCULAR; INTRAVENOUS
Status: DISCONTINUED | OUTPATIENT
Start: 2024-12-19 | End: 2024-12-19

## 2024-12-19 RX ORDER — EPHEDRINE SULFATE 50 MG/ML
INJECTION, SOLUTION INTRAVENOUS
Status: DISCONTINUED | OUTPATIENT
Start: 2024-12-19 | End: 2024-12-19

## 2024-12-19 RX ORDER — HYDROMORPHONE HYDROCHLORIDE 1 MG/ML
0.2 INJECTION, SOLUTION INTRAMUSCULAR; INTRAVENOUS; SUBCUTANEOUS EVERY 5 MIN PRN
Status: DISCONTINUED | OUTPATIENT
Start: 2024-12-19 | End: 2024-12-19 | Stop reason: HOSPADM

## 2024-12-19 RX ORDER — PREGABALIN 75 MG/1
75 CAPSULE ORAL
Status: COMPLETED | OUTPATIENT
Start: 2024-12-19 | End: 2024-12-19

## 2024-12-19 RX ORDER — ONDANSETRON HYDROCHLORIDE 2 MG/ML
4 INJECTION, SOLUTION INTRAVENOUS DAILY PRN
Status: DISCONTINUED | OUTPATIENT
Start: 2024-12-19 | End: 2024-12-19 | Stop reason: HOSPADM

## 2024-12-19 RX ORDER — GLUCAGON 1 MG
1 KIT INJECTION
Status: DISCONTINUED | OUTPATIENT
Start: 2024-12-19 | End: 2024-12-19 | Stop reason: HOSPADM

## 2024-12-19 RX ORDER — KETAMINE HYDROCHLORIDE 100 MG/ML
INJECTION, SOLUTION INTRAMUSCULAR; INTRAVENOUS
Status: DISCONTINUED | OUTPATIENT
Start: 2024-12-19 | End: 2024-12-19 | Stop reason: HOSPADM

## 2024-12-19 RX ORDER — DEXAMETHASONE SODIUM PHOSPHATE 4 MG/ML
INJECTION, SOLUTION INTRA-ARTICULAR; INTRALESIONAL; INTRAMUSCULAR; INTRAVENOUS; SOFT TISSUE
Status: DISCONTINUED | OUTPATIENT
Start: 2024-12-19 | End: 2024-12-19

## 2024-12-19 RX ORDER — ONDANSETRON HYDROCHLORIDE 2 MG/ML
4 INJECTION, SOLUTION INTRAVENOUS EVERY 12 HOURS PRN
Status: DISCONTINUED | OUTPATIENT
Start: 2024-12-19 | End: 2024-12-19 | Stop reason: HOSPADM

## 2024-12-19 RX ORDER — OXYCODONE HYDROCHLORIDE 5 MG/1
5 TABLET ORAL
Status: DISCONTINUED | OUTPATIENT
Start: 2024-12-19 | End: 2024-12-19 | Stop reason: HOSPADM

## 2024-12-19 RX ORDER — PROPOFOL 10 MG/ML
VIAL (ML) INTRAVENOUS
Status: DISCONTINUED | OUTPATIENT
Start: 2024-12-19 | End: 2024-12-19

## 2024-12-19 RX ORDER — KETAMINE HCL IN 0.9 % NACL 50 MG/5 ML
SYRINGE (ML) INTRAVENOUS
Status: DISCONTINUED | OUTPATIENT
Start: 2024-12-19 | End: 2024-12-19

## 2024-12-19 RX ORDER — OXYCODONE HYDROCHLORIDE 5 MG/1
10 TABLET ORAL EVERY 4 HOURS PRN
Status: DISCONTINUED | OUTPATIENT
Start: 2024-12-19 | End: 2024-12-19 | Stop reason: HOSPADM

## 2024-12-19 RX ORDER — CLINDAMYCIN PHOSPHATE 900 MG/50ML
900 INJECTION, SOLUTION INTRAVENOUS
Status: COMPLETED | OUTPATIENT
Start: 2024-12-19 | End: 2024-12-19

## 2024-12-19 RX ORDER — SODIUM CHLORIDE 9 MG/ML
INJECTION, SOLUTION INTRAVENOUS CONTINUOUS
Status: DISCONTINUED | OUTPATIENT
Start: 2024-12-20 | End: 2024-12-19 | Stop reason: HOSPADM

## 2024-12-19 RX ORDER — FENTANYL CITRATE 50 UG/ML
25 INJECTION, SOLUTION INTRAMUSCULAR; INTRAVENOUS EVERY 5 MIN PRN
Status: DISCONTINUED | OUTPATIENT
Start: 2024-12-19 | End: 2024-12-19 | Stop reason: HOSPADM

## 2024-12-19 RX ORDER — MIDAZOLAM HYDROCHLORIDE 1 MG/ML
INJECTION INTRAMUSCULAR; INTRAVENOUS
Status: DISCONTINUED | OUTPATIENT
Start: 2024-12-19 | End: 2024-12-19

## 2024-12-19 RX ORDER — PROMETHAZINE HYDROCHLORIDE 25 MG/1
25 TABLET ORAL EVERY 6 HOURS PRN
Status: DISCONTINUED | OUTPATIENT
Start: 2024-12-19 | End: 2024-12-19 | Stop reason: HOSPADM

## 2024-12-19 RX ORDER — HYDROMORPHONE HYDROCHLORIDE 2 MG/ML
INJECTION, SOLUTION INTRAMUSCULAR; INTRAVENOUS; SUBCUTANEOUS
Status: DISCONTINUED | OUTPATIENT
Start: 2024-12-19 | End: 2024-12-19

## 2024-12-19 RX ORDER — ONDANSETRON HYDROCHLORIDE 2 MG/ML
INJECTION, SOLUTION INTRAVENOUS
Status: DISCONTINUED | OUTPATIENT
Start: 2024-12-19 | End: 2024-12-19

## 2024-12-19 RX ADMIN — Medication 20 MG: at 12:12

## 2024-12-19 RX ADMIN — OXYCODONE 5 MG: 5 TABLET ORAL at 03:12

## 2024-12-19 RX ADMIN — PHENYLEPHRINE HYDROCHLORIDE 100 MCG: 10 INJECTION INTRAVENOUS at 12:12

## 2024-12-19 RX ADMIN — CLINDAMYCIN IN 5 PERCENT DEXTROSE 900 MG: 18 INJECTION, SOLUTION INTRAVENOUS at 12:12

## 2024-12-19 RX ADMIN — SODIUM CHLORIDE, SODIUM GLUCONATE, SODIUM ACETATE, POTASSIUM CHLORIDE, MAGNESIUM CHLORIDE, SODIUM PHOSPHATE, DIBASIC, AND POTASSIUM PHOSPHATE: .53; .5; .37; .037; .03; .012; .00082 INJECTION, SOLUTION INTRAVENOUS at 12:12

## 2024-12-19 RX ADMIN — FENTANYL CITRATE 50 MCG: 50 INJECTION, SOLUTION INTRAMUSCULAR; INTRAVENOUS at 01:12

## 2024-12-19 RX ADMIN — SUGAMMADEX 200 MG: 100 INJECTION, SOLUTION INTRAVENOUS at 01:12

## 2024-12-19 RX ADMIN — SODIUM CHLORIDE: 0.9 INJECTION, SOLUTION INTRAVENOUS at 12:12

## 2024-12-19 RX ADMIN — EPHEDRINE SULFATE 5 MG: 50 INJECTION INTRAVENOUS at 12:12

## 2024-12-19 RX ADMIN — LIDOCAINE HYDROCHLORIDE 100 MG: 20 INJECTION INTRAVENOUS at 12:12

## 2024-12-19 RX ADMIN — DEXAMETHASONE SODIUM PHOSPHATE 8 MG: 4 INJECTION, SOLUTION INTRAMUSCULAR; INTRAVENOUS at 12:12

## 2024-12-19 RX ADMIN — OXYCODONE HYDROCHLORIDE 10 MG: 10 TABLET, FILM COATED, EXTENDED RELEASE ORAL at 10:12

## 2024-12-19 RX ADMIN — PROPOFOL 200 MG: 10 INJECTION, EMULSION INTRAVENOUS at 12:12

## 2024-12-19 RX ADMIN — PREGABALIN 75 MG: 75 CAPSULE ORAL at 10:12

## 2024-12-19 RX ADMIN — HYDROMORPHONE HYDROCHLORIDE 0.4 MG: 2 INJECTION, SOLUTION INTRAMUSCULAR; INTRAVENOUS; SUBCUTANEOUS at 01:12

## 2024-12-19 RX ADMIN — FAMOTIDINE 20 MG: 10 INJECTION, SOLUTION INTRAVENOUS at 12:12

## 2024-12-19 RX ADMIN — EPHEDRINE SULFATE 5 MG: 50 INJECTION INTRAVENOUS at 01:12

## 2024-12-19 RX ADMIN — PHENYLEPHRINE HYDROCHLORIDE 50 MCG: 10 INJECTION INTRAVENOUS at 12:12

## 2024-12-19 RX ADMIN — FENTANYL CITRATE 100 MCG: 50 INJECTION, SOLUTION INTRAMUSCULAR; INTRAVENOUS at 12:12

## 2024-12-19 RX ADMIN — ONDANSETRON 4 MG: 2 INJECTION INTRAMUSCULAR; INTRAVENOUS at 01:12

## 2024-12-19 RX ADMIN — LABETALOL HYDROCHLORIDE 5 MG: 5 INJECTION INTRAVENOUS at 03:12

## 2024-12-19 RX ADMIN — PHENYLEPHRINE HYDROCHLORIDE 100 MCG: 10 INJECTION INTRAVENOUS at 01:12

## 2024-12-19 RX ADMIN — MIDAZOLAM HYDROCHLORIDE 2 MG: 2 INJECTION, SOLUTION INTRAMUSCULAR; INTRAVENOUS at 12:12

## 2024-12-19 RX ADMIN — ROCURONIUM BROMIDE 40 MG: 10 INJECTION, SOLUTION INTRAVENOUS at 12:12

## 2024-12-19 NOTE — ANESTHESIA PREPROCEDURE EVALUATION
12/19/2024  Pre-operative evaluation for Procedure(s) (LRB):  REPAIR, ROTATOR CUFF, ARTHROSCOPIC- [facility] CARE (Left)  REPAIR (Left)    Felix Stern is a 44 y.o. male     Patient Active Problem List   Diagnosis    Gastroesophageal reflux disease without esophagitis    Left shoulder pain    Biceps muscle tear, left, subsequent encounter    Chronic seasonal allergic rhinitis due to pollen    Mild intermittent asthma    Rupture of distal biceps tendon, left, initial encounter    Decreased range of motion of left elbow    Weakness of left upper extremity    Pain aggravated by lifting    Rupture of left distal biceps tendon    HTN (hypertension), benign    Migraine without status migrainosus, not intractable    Major depressive disorder with single episode, in partial remission       Review of patient's allergies indicates:  No Known Allergies    No current facility-administered medications on file prior to encounter.     Current Outpatient Medications on File Prior to Encounter   Medication Sig Dispense Refill    amLODIPine (NORVASC) 10 MG tablet Take 1 tablet (10 mg total) by mouth once daily. 90 tablet 3    citalopram (CELEXA) 20 MG tablet Take 20 mg by mouth once daily.      esomeprazole magnesium (NEXIUM) 10 mg suspension Take 10 mg by mouth before breakfast.      ibuprofen (ADVIL,MOTRIN) 600 MG tablet Take 1 tablet (600 mg total) by mouth every 8 (eight) hours as needed for Pain. 20 tablet 0    losartan (COZAAR) 100 MG tablet TAKE 1 TABLET BY MOUTH EVERY DAY 90 tablet 3    multivitamin (THERAGRAN) tablet Take 1 tablet by mouth once daily.      acyclovir (ZOVIRAX) 400 MG tablet TAKE 1 TABLET BY MOUTH ONCE DAILY (Patient not taking: Reported on 12/16/2024) 90 tablet 1    sumatriptan (IMITREX) 25 MG Tab Take 2 tablets (50 mg total) by mouth every 6 (six) hours as needed (migraine). 15 tablet 0     "VENTOLIN HFA 90 mcg/actuation inhaler Inhale 2 puffs into the lungs every 4 (four) hours as needed for Shortness of Breath or Wheezing.         Past Surgical History:   Procedure Laterality Date    HAND SURGERY      REPAIR,TENDON,DISTAL PROXIMAL Left 2022    Procedure: REPAIR,TENDON,DISTAL PROXIMAL;  Surgeon: Jossy Samayoa MD;  Location: [facility];  Service: Orthopedics;  Laterality: Left;  Distal bicep repair    VASECTOMY         Social History     Socioeconomic History    Marital status:    Tobacco Use    Smoking status: Never    Smokeless tobacco: Never   Substance and Sexual Activity    Alcohol use: Yes     Comment: occasionally    Drug use: No         CBC: No results for input(s): "WBC", "RBC", "HGB", "HCT", "PLT", "MCV", "MCH", "MCHC" in the last 72 hours.    CMP: No results for input(s): "NA", "K", "CL", "CO2", "BUN", "CREATININE", "GLU", "MG", "PHOS", "CALCIUM", "ALBUMIN", "PROT", "ALKPHOS", "ALT", "AST", "BILITOT" in the last 72 hours.    INR  No results for input(s): "PT", "INR", "PROTIME", "APTT" in the last 72 hours.        Diagnostic Studies:      [facility] Echo:  No results found for this or any previous visit.       Pre-op Assessment    I have reviewed the Patient Summary Reports.     I have reviewed the Nursing Notes. I have reviewed the NPO Status.   I have reviewed the Medications.     Review of Systems  Cardiovascular:     Hypertension                                          Pulmonary:    Asthma                    Hepatic/GI:     GERD                    Physical Exam  General: Well nourished and Cooperative    Airway:  Mallampati: II   Mouth Opening: Normal  TM Distance: Normal  Tongue: Normal  Neck ROM: Normal ROM    Chest/Lungs:  Clear to auscultation, Normal Respiratory Rate    Heart:  Rate: Normal  Rhythm: Regular Rhythm  Sounds: Normal        Anesthesia Plan  Type of Anesthesia, risks & benefits discussed:    Anesthesia Type: Gen ETT  Intra-op Monitoring Plan: Standard ASA " Monitors  Post Op Pain Control Plan: multimodal analgesia and IV/PO Opioids PRN  Induction:  IV  Airway Plan: Direct and Video, Post-Induction  Informed Consent: Informed consent signed with the Patient and all parties understand the risks and agree with anesthesia plan.  All questions answered.   ASA Score: 2    Ready For Surgery From Anesthesia Perspective.     .

## 2024-12-19 NOTE — OP NOTE
DATE OF PROCEDURE: 12/19/2024    SURGEON: Jossy Samayoa M.D.    ASSISTANT: MICHAELA Viera PA-C  The use of an assistant was medically necessary for positioning, skin retraction, and assistance with this procedure. The procedure could not be performed without the use of an assistant.   There was no qualified resident/fellow available for assistance with this procedure.    PREOPERATIVE DIAGNOSES:   left  1. Shoulder rotator cuff tear   2. Shoulder biceps tendonitis  3. Shoulder synovitis  4. Shoulder SLAP  5. Shoulder impingement, bursitis    POSTOPERATIVE DIAGNOSES:   left  1. Shoulder rotator cuff tear   2. Shoulder biceps tendonitis  3. Shoulder synovitis  4. Shoulder SLAP  5. Shoulder impingement, bursitis  6. Shoulder adhesions    OPERATION:   left  1. Shoulder arthroscopic rotator cuff repair 50 x 25 mm, large, double row (CPT 17963)  (complex -22 modifier)  2. Shoulder arthroscopic biceps tenodesis (CPT 62586)  3. Shoulder arthroscopic subacromial decompression, bursectomy   4. Shoulder arthroscopic extensive debridement (anterior, posterior glenohumeral joint, subacromial space) (CPT 48047)  5. Shoulder arthroscopic labral debridement (CPT 49158)  6. Shoulder arthroscopic microfracture (Crimson duvet technique) (CPT 09541)  7. Shoulder arthroscopic lysis of adhesions (CPT 31053)    ANESTHESIA:  General with intra-op suprascapular nerve block with local    ESTIMATED BLOOD LOSS:  Minimal.    TOURNIQUET TIME:  None.    DRAINS:  None.    COMPLICATIONS:  None.  The patient was moved to the recovery room in stable condition with compartments soft and cap refill less than a second in all digits.    I was physically present during the critical/key portion(s) of the procedure.    COMPLEX PROCEDURE:   There was an altered surgical field. There was abnormal anatomy. There was scarring to the area, with the large tear that was thin, retracted and scarred in significantly. Complexity of the service was much greater than the  normative procedure. There was increased time, intensity and technical difficulty of the procedure, severity of the patient's condition and mental effort required.  This was a highly complicated repair and tear pattern that required advanced arthroscopic skill in order to safely and technically perform it.  Nevertheless the repair achieved was excellent. Biologic augmentation with Cuffmend and application of biologic implant for soft tissue reinforcement for compromised rotator cuff tissue was necessary.    INDICATIONS/MEDICAL NECESSITY: The patient is a 44 y.o. year-old male who has history and physical examination findings consistent with the above.  Nonoperative versus operative options were discussed.  The risks and benefits were discussed with the patient.  The patient acknowledged understanding and wished to proceed with operative intervention.  Informed consent was obtained prior to the procedure.  Reasonable expectations and potential complications were discussed and acknowledged, including but not limited to infection, bleeding, blood clots, (DVT and/or PE), nerve injury, tear, instability, continued pain and stiffness.  They agreed and understood and wished to proceed.    EXAMINATION UNDER ANESTHESIA of the left SHOULDER: Forward elevation 175 degrees, External rotation at 0 60 degrees, External rotation at 90 90 degrees, Internal rotation at 90 60 degrees.  Translation testing: anterior grade 1, posterior grade 1, sulcus sign grade 1 corrects to 0 on external rotation.    EXAMINATION UNDER ANESTHESIA of the right SHOULDER: Forward elevation 175 degrees, External rotation at 0 60 degrees, External rotation at 90 90 degrees, Internal rotation at 90 60 degrees.  Translation testing: anterior grade 1, posterior grade 1, sulcus sign grade 1 corrects to 0 on external rotation.    PROCEDURE IN DETAIL:  After the correct operative site was marked by the operating surgeon. The patient was then taken to the  operating room and placed supine on the operating room table, where the patient  underwent general anesthesia by the anesthesia team.  The patient was then rolled into the lateral decubitus position with the operative side up.  A well-padded axillary roll, beanbag and pillows were placed.  All pressure points were carefully padded and checked.  The upper extremities and both lower extremities were placed in comfortable positions and were also well-padded.  The operative upper extremity was then prepped and draped in the usual sterile fashion.    Suprascapular nerve block was performed in the standard fashion with 5cc local anesthetic.     The Spider arm positioner was implemented with balanced suspension and appropriate landmarks were noted on the skin.  A posterior followed by sarah-superior portals were created and systematic examination of the joint revealed the following:      There was no evidence of any significant chondral lesions to the glenoid or humeral head.     Tenosynovitis and SLAP type II was noted to the biceps tendon on ramp sign.    A 4.75 x 19.1 mm Swivelock bio-composite anchor x 1 was placed at the proximal aspect of the groove with loop technique through tendon. The proximal suture was passed though the biceps tendon in a lasso-loop technique, fastening the biceps tenodesis.    There was some synovitis and tearing to the labrum that was debrided as needed superiorly and anteriorly.  Attention was then turned to the rotator cuff.  The rotator cuff undersurface was then visualized and debrided as needed using a shaver.     IGHL 16 mm  Passport 40/30 mm    Attention was then turned to the subacromial space where a significant hypertrophic bursa was encountered.  The bursa itself was thickened and hypertrophic.  A lateral portal was created to assist with the bursectomy.  Subacromial decompression was completed using three-portal technique in the standard fashion with a 4.5 mm carlos without  difficulty.  The anterior osteophyte was flattened.  Confirmation of adequate resection was confirmed while viewing from the lateral portal.      COMPLEX PROCEDURE:   There was an altered surgical field. There was abnormal anatomy. There was scarring to the area, with the large tear that was thin, retracted and scarred in significantly. Complexity of the service was much greater than the normative procedure. There was increased time, intensity and technical difficulty of the procedure, severity of the patient's condition and mental effort required.  This was a highly complicated repair and tear pattern that required advanced arthroscopic skill in order to safely and technically perform it.  Nevertheless the repair achieved was excellent. Biologic augmentation with Cuffmend and application of biologic implant for soft tissue reinforcement for compromised rotator cuff tissue was necessary.    The surface of the rotator cuff was then gently debrided and mobilized.  We did this using a shaver while viewing through the posterior portal and the shaver used through the lateral portal.  We were then able to mobilize the cuff tear which was located at the supraspinatus extending to the infraspinatus.  The cuff was able to be mobilized adequately laterally.  The cuff tear dimensions were: 50 mm AP and 25 mm medial to lateral.  The undersurface edge of the cuff was debrided as needed using the shaver.  A 7 mm cannula was placed in the lateral and sarah-superior portals.  The bony bed of the greater tuberosity was prepared with the carlos.  This left a nice surface for the articular surface of the cuff to be repaired to and heal to.  Adequate debridement was performed, moving the arm as needed with internal/external rotation.     Shoulder arthroscopic lysis of adhesions (CPT 02018):  With the arthroscope in the subacromial space, an extensive lysis of adhesions needed to be performed with lysis of adhesions in the lateral  gutter, posterior gutter, and anterior gutter where there was extensive scarring from the chronic nature of the rotator cuff tear.  The rotator cuff was retracted and there was a large tear noted.  After the lysis of adhesions, the mobility was restored to the rotator cuff tissue and shoulder.    A 3 mm incision was made to place the 2 anchors through.  This was done in a central location using internal and external rotation to place the two 5.5mm Arthrex Bio-corkscrew anchors; one  anteriorly and one posteriorly.  The anterior anchor was placed first and the posterior anchor was placed second.  Scorpion suture-passing device was used to place two horizontal mattress sutures through the cuff starting anteriorly and proceeding posteriorly.  Next, the posterior anchor was placed and two more horizontal mattress sutures were passed.  After the two anchors were placed we had four horizontal mattress sutures proceeding from anterior to posterior that were running through the cuff.  The cuff tear was a crescentic/L shaped tear.  The sutures were then tied using modified August knots proceeding from anterior to posterior.  All knots had good loop and knot security using modified August knots.  After knots were applied from anterior to posterior, the cuff was reapproximated down to the greater tuberosity with good compression and knots on the superior side of the cuff.  The shoulder was cycled through full internal/external rotation.  No suture breakage was noted and the cuff was noted to remain nicely reapproximated throughout the entire rotation of the joint.  The scope was then placed in the joint on the articular side of the cuff.  The cuff was reapproximated nicely.     Prior to the lateral cuff fixation, 'crimson ducharlest' microfracture technique was performed using an awl to the greater tuberosity in the standard fashion using awl punch device. There was confirmation of marrow elements extravasating out of the end of  the microfracture holes upon decrease of pump pressure.     Double row cuff fixation was then performed using a 4.75 x 19.1 mm Swivelock bio-composite anchor x 2.  This gave good compression of the rotator cuff tissue lateral to the medial row down onto the greater tuberosity, which was previously prepared with a bur.      POSTOPERATIVE PLAN: We will follow the arthroscopic rotator cuff repair guidelines for a large size rotator cuff tear.  We discussed with the patient's family after surgery.  The patient will remain in a sling for 6 weeks.  PT to start at 4 weeks.    Quality of tissue: fair to good     Quality of the repair: good      Size of tear: 50 x 25 mm

## 2024-12-19 NOTE — DISCHARGE SUMMARY
[facility] - Surgery (Hospital)  Brief Operative Note    Surgery Date: 12/19/2024     Surgeons and Role:     * Jossy Samayoa MD - Primary    Assisting Surgeon: Junior Viera PA-C    Pre-op Diagnosis:  Traumatic rotator cuff tear, left, initial encounter [S46.012A]  Biceps tendonitis on left [M75.22]  SLAP lesion of left shoulder [S43.432A]    Post-op Diagnosis:  Post-Op Diagnosis Codes:     * Traumatic rotator cuff tear, left, initial encounter [S46.012A]     * Biceps tendonitis on left [M75.22]     * SLAP lesion of left shoulder [S43.432A]    Procedure(s) (LRB):  REPAIR, ROTATOR CUFF, ARTHROSCOPIC- POLAR CARE (Left)  REPAIR (Left)    Anesthesia: General    Operative Findings: Rotator cuff tear, biceps tendinitis  See full op report for details    Estimated Blood Loss: * No values recorded between 12/19/2024 12:34 PM and 12/19/2024  1:22 PM *         Specimens:   Specimen (24h ago, onward)      None              Discharge Note    OUTCOME: Patient tolerated treatment/procedure well without complication and is now ready for discharge.    DISPOSITION: Home or Self Care    FINAL DIAGNOSIS:    Left shoulder rotator cuff tear  Left shoulder biceps tendinitis    FOLLOWUP: In clinic    DISCHARGE INSTRUCTIONS:  No discharge procedures on file.

## 2024-12-19 NOTE — PLAN OF CARE
VSS.  Patient tolerating oral liquids without difficulty.   No apparent s&s of distress noted at this time, no complaints voiced at this time.   Discharge instructions reviewed with patient and significant other with good verbal feedback received.   Post op medications delivered to bedside, DME arm sling and polar care intact.  Patient ready for discharge.

## 2024-12-19 NOTE — PLAN OF CARE
Polar care needed.    Need site brenda, update and anesthesia consent.    Bed in lowest, locked position with call light within reach. Side rails up X2. Questions asked. No apparent distress noted. Ongoing monitoring in place.    Belongings to be locked in a locker.    Girlfriend coming to bedside.

## 2024-12-19 NOTE — TRANSFER OF CARE
"Anesthesia Transfer of Care Note    Patient: Felix Stern    Procedure(s) Performed: Procedure(s) (LRB):  REPAIR, ROTATOR CUFF, ARTHROSCOPIC- POLAR CARE (Left)  ARTHROSCOPY, SHOULDER, WITH SUBACROMIAL SPACE DECOMPRESSION (Left)  ARTHROSCOPY,SHOULDER,WITH BICEPS TENODESIS (Left)    Patient location: PACU    Anesthesia Type: general    Transport from OR: Transported from OR on 6-10 L/min O2 by face mask with adequate spontaneous ventilation    Post pain: adequate analgesia    Post assessment: tolerated procedure well and no apparent anesthetic complications    Post vital signs: stable    Level of consciousness: awake    Nausea/Vomiting: no nausea/vomiting    Complications: none    Transfer of care protocol was followed      Last vitals: Visit Vitals  BP (!) 138/100 (BP Location: Right arm, Patient Position: Sitting)   Pulse 87   Temp 36.5 °C (97.7 °F) (Oral)   Resp 16   Ht 5' 7" (1.702 m)   Wt 79.8 kg (176 lb)   SpO2 97%   BMI 27.57 kg/m²     "

## 2024-12-19 NOTE — ANESTHESIA POSTPROCEDURE EVALUATION
Anesthesia Post Evaluation    Patient: Felix Stern    Procedure(s) Performed: Procedure(s) (LRB):  REPAIR, ROTATOR CUFF, ARTHROSCOPIC- POLAR CARE (Left)  ARTHROSCOPY, SHOULDER, WITH SUBACROMIAL SPACE DECOMPRESSION (Left)  ARTHROSCOPY,SHOULDER,WITH BICEPS TENODESIS (Left)    Final Anesthesia Type: general      Patient location during evaluation: PACU  Patient participation: Yes- Able to Participate  Level of consciousness: awake and alert  Post-procedure vital signs: reviewed and stable  Pain management: adequate  Airway patency: patent  ANITA mitigation strategies: Multimodal analgesia  PONV status at discharge: No PONV  Anesthetic complications: no      Cardiovascular status: blood pressure returned to baseline and hemodynamically stable  Respiratory status: unassisted  Hydration status: euvolemic  Follow-up not needed.              Vitals Value Taken Time   /100 12/19/24 1531   Temp 36.6 °C (97.9 °F) 12/19/24 1530   Pulse 89 12/19/24 1542   Resp 24 12/19/24 1542   SpO2 90 % 12/19/24 1530   Vitals shown include unfiled device data.      Event Time   Out of Recovery 15:05:00         Pain/Romie Score: Pain Rating Prior to Med Admin: 5 (12/19/2024  3:10 PM)  Romie Score: 10 (12/19/2024  3:40 PM)

## 2024-12-20 NOTE — OPERATIVE NOTE ADDENDUM
Certification of Assistant at Surgery       Surgery Date: 12/19/2024     Participating Surgeons:  Surgeons and Role:     * Jossy Samayoa MD - Primary    MICHAELA Viera PA-C - 1st Assistant     Procedures:  Procedure(s) (LRB):  REPAIR, ROTATOR CUFF, ARTHROSCOPIC- POLAR CARE (Left)  ARTHROSCOPY, SHOULDER, WITH SUBACROMIAL SPACE DECOMPRESSION (Left)  ARTHROSCOPY,SHOULDER,WITH BICEPS TENODESIS (Left)    Assistant Surgeon's Certification of Necessity:  I understand that section 1842 (b) (6) (d) of the Social Security Act generally prohibits Medicare Part B reasonable charge payment for the services of assistants at surgery in teaching hospitals when qualified residents are available to furnish such services. I certify that the services for which payment is claimed were medically necessary, and that no qualified resident was available to perform the services. I further understand that these services are subject to post-payment review by the Medicare carrier.         Marcin Viera PA-C    12/19/2024  10:13 PM

## 2024-12-22 DIAGNOSIS — S46.012A TRAUMATIC ROTATOR CUFF TEAR, LEFT, INITIAL ENCOUNTER: ICD-10-CM

## 2024-12-22 RX ORDER — OXYCODONE AND ACETAMINOPHEN 10; 325 MG/1; MG/1
TABLET ORAL
Qty: 21 TABLET | Refills: 0 | Status: CANCELLED | OUTPATIENT
Start: 2024-12-22

## 2024-12-22 RX ORDER — TRAMADOL HYDROCHLORIDE 50 MG/1
50-100 TABLET ORAL EVERY 6 HOURS PRN
Qty: 21 TABLET | Refills: 0 | Status: CANCELLED | OUTPATIENT
Start: 2024-12-22

## 2024-12-22 RX ORDER — PROMETHAZINE HYDROCHLORIDE 25 MG/1
25 TABLET ORAL EVERY 6 HOURS PRN
Qty: 8 TABLET | Refills: 0 | Status: CANCELLED | OUTPATIENT
Start: 2024-12-22

## 2024-12-23 ENCOUNTER — PATIENT MESSAGE (OUTPATIENT)
Dept: SPORTS MEDICINE | Facility: CLINIC | Age: 44
End: 2024-12-23
Payer: COMMERCIAL

## 2024-12-23 DIAGNOSIS — S46.012A TRAUMATIC ROTATOR CUFF TEAR, LEFT, INITIAL ENCOUNTER: ICD-10-CM

## 2024-12-23 NOTE — TELEPHONE ENCOUNTER
FW: Rx   Ora Torrez MA   Sent: Mon December 23, 2024  4:39 PM   To: P Danita Fenton Staff   Rx  (Newest Message First)  December 23, 2024         12/23/24  4:39 PM  Ora Torrez MA routed this conversation to Danita Jossy Staff       [facility] (supporting Marcin Viera III, PARudolphC)         12/23/24  4:24 PM  I ended up putting in a request there was tramadol, Percocet and phenergan    Ora Torrez MA to Felix Stern         12/23/24  2:17 PM  Hi Mr. Washington,   May you please state which medication you would like refilled.     Last read by Felix Stern at  4:24 PM on 12/23/2024.    Me to Ora Torrez MA   ET    12/23/24  2:13 PM   Please confirm which medications pt would like refilled.             12/23/24  2:08 PM  Ora Torrez MA routed this conversation to Danita Jossy Staff      [facility] (supporting Marcin Viera III, PABRENDAN)       12/23/24  1:17 PM  Good afternoon, I was just checking with you to see if you could refill my prescriptions. Thank you    This encounter is not signed. The conversation may still be ongoing.

## 2024-12-24 RX ORDER — PROMETHAZINE HYDROCHLORIDE 25 MG/1
25 TABLET ORAL EVERY 6 HOURS PRN
Qty: 8 TABLET | Refills: 0 | Status: SHIPPED | OUTPATIENT
Start: 2024-12-24 | End: 2024-12-27

## 2024-12-24 RX ORDER — TRAMADOL HYDROCHLORIDE 50 MG/1
50-100 TABLET ORAL EVERY 6 HOURS PRN
Qty: 21 TABLET | Refills: 0 | Status: SHIPPED | OUTPATIENT
Start: 2024-12-24 | End: 2024-12-27

## 2024-12-24 RX ORDER — OXYCODONE AND ACETAMINOPHEN 10; 325 MG/1; MG/1
TABLET ORAL
Qty: 21 TABLET | Refills: 0 | Status: SHIPPED | OUTPATIENT
Start: 2024-12-24 | End: 2024-12-27

## 2024-12-27 DIAGNOSIS — S46.012A TRAUMATIC ROTATOR CUFF TEAR, LEFT, INITIAL ENCOUNTER: ICD-10-CM

## 2024-12-27 RX ORDER — PROMETHAZINE HYDROCHLORIDE 25 MG/1
25 TABLET ORAL EVERY 6 HOURS PRN
Qty: 8 TABLET | Refills: 0 | Status: SHIPPED | OUTPATIENT
Start: 2024-12-27

## 2024-12-27 RX ORDER — TRAMADOL HYDROCHLORIDE 50 MG/1
50-100 TABLET ORAL EVERY 6 HOURS PRN
Qty: 21 TABLET | Refills: 0 | Status: SHIPPED | OUTPATIENT
Start: 2024-12-27

## 2024-12-27 RX ORDER — OXYCODONE AND ACETAMINOPHEN 10; 325 MG/1; MG/1
TABLET ORAL
Qty: 21 TABLET | Refills: 0 | Status: SHIPPED | OUTPATIENT
Start: 2024-12-27

## 2025-01-03 ENCOUNTER — OFFICE VISIT (OUTPATIENT)
Dept: SPORTS MEDICINE | Facility: CLINIC | Age: 45
End: 2025-01-03
Payer: COMMERCIAL

## 2025-01-03 VITALS
DIASTOLIC BLOOD PRESSURE: 99 MMHG | WEIGHT: 176.81 LBS | HEART RATE: 116 BPM | BODY MASS INDEX: 27.69 KG/M2 | SYSTOLIC BLOOD PRESSURE: 161 MMHG

## 2025-01-03 DIAGNOSIS — Z98.890 S/P ROTATOR CUFF SURGERY: Primary | ICD-10-CM

## 2025-01-03 PROCEDURE — 99024 POSTOP FOLLOW-UP VISIT: CPT | Mod: S$GLB,,, | Performed by: PHYSICIAN ASSISTANT

## 2025-01-03 PROCEDURE — 3080F DIAST BP >= 90 MM HG: CPT | Mod: CPTII,S$GLB,, | Performed by: PHYSICIAN ASSISTANT

## 2025-01-03 PROCEDURE — 1160F RVW MEDS BY RX/DR IN RCRD: CPT | Mod: CPTII,S$GLB,, | Performed by: PHYSICIAN ASSISTANT

## 2025-01-03 PROCEDURE — 1159F MED LIST DOCD IN RCRD: CPT | Mod: CPTII,S$GLB,, | Performed by: PHYSICIAN ASSISTANT

## 2025-01-03 PROCEDURE — 3077F SYST BP >= 140 MM HG: CPT | Mod: CPTII,S$GLB,, | Performed by: PHYSICIAN ASSISTANT

## 2025-01-03 PROCEDURE — 99999 PR PBB SHADOW E&M-EST. PATIENT-LVL III: CPT | Mod: PBBFAC,,, | Performed by: PHYSICIAN ASSISTANT

## 2025-01-05 ENCOUNTER — PATIENT MESSAGE (OUTPATIENT)
Dept: PRIMARY CARE CLINIC | Facility: CLINIC | Age: 45
End: 2025-01-05
Payer: COMMERCIAL

## 2025-01-07 DIAGNOSIS — S46.012A TRAUMATIC ROTATOR CUFF TEAR, LEFT, INITIAL ENCOUNTER: ICD-10-CM

## 2025-01-08 RX ORDER — OXYCODONE AND ACETAMINOPHEN 10; 325 MG/1; MG/1
TABLET ORAL
Qty: 21 TABLET | Refills: 0 | Status: SHIPPED | OUTPATIENT
Start: 2025-01-08

## 2025-01-08 RX ORDER — TRAMADOL HYDROCHLORIDE 50 MG/1
50-100 TABLET ORAL EVERY 6 HOURS PRN
Qty: 21 TABLET | Refills: 0 | Status: SHIPPED | OUTPATIENT
Start: 2025-01-08

## 2025-01-13 NOTE — PROGRESS NOTES
Chief Complaint: left shoulder pain     HISTORY OF PRESENT ILLNESS:   Pt is here today for post-operative followup of shoulder arthroscopy.  He is doing well.  We have reviewed patient's findings and discussed plan of care and future treatment options.      Tolerating pain well.   Wearing sling which is in place.  Rates pain at 4/10  No issues reported.     DATE OF PROCEDURE: 12/19/2024  SURGEON: Jossy Samayoa M.D.  OPERATION:   left  1. Shoulder arthroscopic rotator cuff repair 50 x 25 mm, large, double row (CPT 17377)  (complex -22 modifier)  2. Shoulder arthroscopic biceps tenodesis (CPT 10739)  3. Shoulder arthroscopic subacromial decompression, bursectomy   4. Shoulder arthroscopic extensive debridement (anterior, posterior glenohumeral joint, subacromial space) (CPT 56809)  5. Shoulder arthroscopic labral debridement (CPT 37506)  6. Shoulder arthroscopic microfracture (Crimson duvet technique) (CPT 47733)  7. Shoulder arthroscopic lysis of adhesions (CPT 84308)       PHYSICAL EXAMINATION:     Incision sites healed well  No evidence of any erythema, infection or induration  elbow Range of motion full   Minimal effusion  2+ Radial pulses  Capillary refill < 3secs  No swelling                                                                               ASSESSMENT:                                                                                                                                               1. Status post above, doing well.                                                                                                                               PLAN:                                                                                                                                                     1. PT to start 4 weeks post-op; wean narcotics  2. Emphasized scapular function.  3. I have discussed return to activity in detail.  4. Patient will see us back in 4 weeks.                                       5. Discussed case with PT.    All questions were answered, surgical technique was reviewed and interpreted, and patient should contact us with any questions or concerns in the interim.

## 2025-01-16 ENCOUNTER — TELEPHONE (OUTPATIENT)
Dept: PHARMACY | Facility: CLINIC | Age: 45
End: 2025-01-16
Payer: COMMERCIAL

## 2025-01-16 ENCOUNTER — CLINICAL SUPPORT (OUTPATIENT)
Dept: REHABILITATION | Facility: HOSPITAL | Age: 45
End: 2025-01-16
Payer: COMMERCIAL

## 2025-01-16 DIAGNOSIS — M25.612 DECREASED RANGE OF MOTION OF SHOULDER, LEFT: ICD-10-CM

## 2025-01-16 DIAGNOSIS — R29.898 DECREASED STRENGTH OF UPPER EXTREMITY: ICD-10-CM

## 2025-01-16 DIAGNOSIS — M25.612 DECREASED RANGE OF MOTION OF LEFT SHOULDER: Primary | ICD-10-CM

## 2025-01-16 DIAGNOSIS — S46.012A TRAUMATIC ROTATOR CUFF TEAR, LEFT, INITIAL ENCOUNTER: ICD-10-CM

## 2025-01-16 PROCEDURE — 97161 PT EVAL LOW COMPLEX 20 MIN: CPT

## 2025-01-16 PROCEDURE — 97110 THERAPEUTIC EXERCISES: CPT

## 2025-01-16 NOTE — PLAN OF CARE
[facility]  Physical Therapy Initial Evaluation    Name: Felix Issa [facility] (formerly [facility])  Clinic Number: [id number]    Therapy Diagnosis:   Encounter Diagnoses   Name Primary?    Traumatic rotator cuff tear, left, initial encounter     Decreased range of motion of left shoulder Yes    Decreased strength of upper extremity     Decreased range of motion of shoulder, left      Physician: Marcin Viera III, *    Physician Orders: PT Eval and Treat  Medical Diagnosis from Referral: Traumatic rotator cuff tear, left, initial encounter [S46.012A]   Evaluation Date: 1/16/2025  Authorization Period Expiration: 12/31/2025  Plan of Care Expiration: 04/10/2025  Progress Note Due: 02/13/2025  Visit # / Visits authorized: 1/ 1  FOTO Intake: #1 given on eval   FOTO Follow Up:   FOTO Follow UP:     Time In: 1005 am  Time Out: 1100 am  Total Billable Time: 55 minutes    DOS: 12/19/2024  S/p: left  1. Shoulder arthroscopic rotator cuff repair 50 x 25 mm, large, double row (CPT 51892)  (complex -22 modifier)  2. Shoulder arthroscopic biceps tenodesis (CPT 21515)  3. Shoulder arthroscopic subacromial decompression, bursectomy   4. Shoulder arthroscopic extensive debridement (anterior, posterior glenohumeral joint, subacromial space) (CPT 76200)  5. Shoulder arthroscopic labral debridement (CPT 99396)  6. Shoulder arthroscopic microfracture (Crimson duvet technique) (CPT 04593)  7. Shoulder arthroscopic lysis of adhesions (CPT 65203)     Post-Op Precautions: We will follow the arthroscopic rotator cuff repair guidelines for a large size rotator cuff tear.  We discussed with the patient's family after surgery.  The patient will remain in a sling for 6 weeks.  PT to start at 4 weeks.       Precautions: Standard and post-op    Subjective     Date of onset: November 2024  History of current condition - Felix reports: in November he was on a camping trip and fell off a skateboard landing on his L elbow and injuring his L  shoulder. He was quickly diagnosed with L large rotator cuff tear and underwent the above surgery. He presents today 4 weeks s/p with sling in place and concerns over re injuring his arm due to history of distal biceps repair failure and revision.        Imaging MRI Full-thickness full width tear of supraspinatus and infraspinatus with retraction to the superior humeral head. No evident muscular atrophy. Associated subacromial subdeltoid bursitis.     Prior Falls: none    Prior Therapy: PT for biceps repair sx  Social History: lives with family  Occupation: owns automotive shop  Prior Level of Function  painfree independent in all ADLs  Current Level of Function:  decreased use of L UE 2/2 sling, dependent in ADLs    Pain:  Current 3/10, worst 7/10, best 0/10   Location: left shoulder  Description: Aching, Dull, Throbbing, Tight, and Deep  Aggravating Factors: Walking and Night Time  Easing Factors: pain medication, ice, and rest    Pts goals: to have full use of his L UE       Medical History:   Past Medical History:   Diagnosis Date    GERD (gastroesophageal reflux disease)     HTN (hypertension), benign 2/10/2023       Surgical History:   Felix Garciafahadmonika  has a past surgical history that includes Hand surgery; Vasectomy; repair,tendon,distal proximal (Left, 12/8/2022); Arthroscopic repair of rotator cuff of shoulder (Left, 12/19/2024); Arthroscopy of shoulder with decompression of subacromial space (Left, 12/19/2024); and arthroscopy,shoulder,with biceps tenodesis (Left, 12/19/2024).    Medications:   Felix has a current medication list which includes the following prescription(s): amlodipine, aspirin, citalopram, docusate sodium, esomeprazole magnesium, ibuprofen, losartan, multivitamin, mupirocin, oxycodone-acetaminophen, promethazine, sumatriptan, tramadol, and ventolin hfa.    Allergies:   Review of patient's allergies indicates:  No Known Allergies     Objective     Observation: pt presents in  "abduction sling    Posture: L shoulder depressed, flattened thoracic spine       Shoulder Passive Range of Motion within Protcol Limits:    Right Left   Flexion   185 90   ER at 0   60 20   ER at 90   90 NT   IR   35  NT     Elbow Active Range of Motion within Protcol Limits   Right Left   Flexion   125 120   Extension   0 0     Cervical Active Range of Motion: WFL    Wrist Active Range of Motion : WFL      Palpation: TTP at and around incision      Sensation: Intact      Pulses: Intact       Special Tests: Not performed today due to post-op status     Strength: Not assessed today due to post-op status.     Joint Mobility: Not assessed today due to post-op status.        CMS Impairment/Limitation/Restriction for FOTO Shoulder Survey    Therapist reviewed FOTO scores for Felix Stern on 1/16/2025.   FOTO documents entered into [employer] - see Media section.    Limitation Score: 74%  Predicted Score: 35%  Category: Mobility       Treatment     Treatment Time In: 1035 am  Treatment Time Out: 1100 am  Total Treatment time separate from Evaluation: 25 minutes    Felix received the following:     Therapeutic exercises to develop strength, endurance, ROM, and flexibility for 25 minutes including:  Pendulum hang 3 min  Scap retractions 20 x 5"   Elbow flex/ext AA 3 x 10 x 5"   Wrist flex/ext 20x   Sling adjustment      Home Exercises and Patient Education Provided     Education provided:   - Sling wear and lifting restrictions were reviewed  - Prognosis, activity modification, goals for therapy, role of therapy for care, exercises/HEP    Written Home Exercises Provided: yes.  Exercises were reviewed and Felix was able to demonstrate them prior to the end of the session.   Pt received a written copy of exercises to perform at home. Felix demonstrated good  understanding of the education provided.     See EMR under patient instructions for exercises given.     Assessment     Felix is a 44 y.o. male referred to outpatient " Physical Therapy s/p L large rotator cuff repair on 12/19/2024. The patient demonstrates normal post operative restrictions in shoulder AROM/PROM, underlying strength deficits, pain, difficulty sleeping and decreased ability to independently complete ADLs and IADLs decreasing quality. Pt will benefit from skilled outpatient Physical Therapy to address the deficits stated above and in the chart below, provide pt/family education, and to maximize pt's level of independence. Pt prognosis is Good.     Plan of care discussed with patient: Yes  Pt's spiritual, cultural and educational needs considered and patient is agreeable to the plan of care and goals as stated below:       Anticipated Barriers for therapy: none    Medical Necessity is demonstrated by the following  History  Co-morbidities and personal factors that may impact the plan of care [x] LOW: no personal factors / co-morbidities  [] MODERATE: 1-2 personal factors / co-morbidities  [] HIGH: 3+ personal factors / co-morbidities    Moderate / High Support Documentation:   Co-morbidities affecting plan of care: none    Personal Factors:   no deficits     Examination  Body Structures and Functions, activity limitations and participation restrictions that may impact the plan of care [] LOW: addressing 1-2 elements  [] MODERATE: 3+ elements  [x] HIGH: 4+ elements (please support below)    Moderate / High Support Documentation: range of motion, strength, joint mobility, posture     Clinical Presentation [x] LOW: stable  [] MODERATE: Evolving  [] HIGH: Unstable     Decision Making/ Complexity Score: low       Goals:    Goals:  Short Term Goals: 6 weeks  1. Pt will be compliant with HEP 50% of prescribed amount.   2. The pt to demo improvement in L shoulder PROM to by 80% of the R  3.  The pt to demo tolerance to being out of the sling for 24 hours with pain <2/10     Long Term Goals: 24 weeks   Pt will be compliant with % of prescribed amount.   Pt will score  35% or better on FOTO Shoulder Mobility  The pt to improvement in AROM of L shoulder within 80% of R shoulder to improve tolerance to OH movement   The pt to  Demo at least 4+/5 of RTC muscle testing to demo improvement in tolerance to activity  The pt to tolerate lifting 10# overhead to improve tolerance to ADLs and work related activities   The pt will report full participation in ADLs and IADLs without restrictions related to L Shoulder.      Plan   Plan of care Certification: 1/16/2025 to 04/10/2025.    Outpatient Physical Therapy 2 times weekly for 12 weeks to include the following interventions: Manual Therapy, Moist Heat/ Ice, Neuromuscular Re-ed, Patient Education, Therapeutic Activites, and Therapeutic Exercise.     Abiagil Kulkarni, PT , DPT, SCS

## 2025-01-17 ENCOUNTER — OFFICE VISIT (OUTPATIENT)
Dept: PRIMARY CARE CLINIC | Facility: CLINIC | Age: 45
End: 2025-01-17
Payer: COMMERCIAL

## 2025-01-17 VITALS
BODY MASS INDEX: 27.73 KG/M2 | SYSTOLIC BLOOD PRESSURE: 142 MMHG | DIASTOLIC BLOOD PRESSURE: 88 MMHG | OXYGEN SATURATION: 95 % | HEART RATE: 81 BPM | WEIGHT: 177 LBS

## 2025-01-17 DIAGNOSIS — I10 HTN (HYPERTENSION), BENIGN: ICD-10-CM

## 2025-01-17 DIAGNOSIS — M25.512 CHRONIC LEFT SHOULDER PAIN: ICD-10-CM

## 2025-01-17 DIAGNOSIS — G89.29 CHRONIC LEFT SHOULDER PAIN: ICD-10-CM

## 2025-01-17 DIAGNOSIS — Z11.4 SCREENING FOR HIV (HUMAN IMMUNODEFICIENCY VIRUS): ICD-10-CM

## 2025-01-17 DIAGNOSIS — Z12.5 SCREENING FOR MALIGNANT NEOPLASM OF PROSTATE: ICD-10-CM

## 2025-01-17 DIAGNOSIS — K21.9 GASTROESOPHAGEAL REFLUX DISEASE WITHOUT ESOPHAGITIS: ICD-10-CM

## 2025-01-17 DIAGNOSIS — Z11.59 ENCOUNTER FOR HEPATITIS C SCREENING TEST FOR LOW RISK PATIENT: ICD-10-CM

## 2025-01-17 DIAGNOSIS — Z00.00 ANNUAL PHYSICAL EXAM: Primary | ICD-10-CM

## 2025-01-17 DIAGNOSIS — H92.02 LEFT EAR PAIN: ICD-10-CM

## 2025-01-17 PROCEDURE — 99396 PREV VISIT EST AGE 40-64: CPT | Mod: S$GLB,,, | Performed by: NURSE PRACTITIONER

## 2025-01-17 PROCEDURE — 3077F SYST BP >= 140 MM HG: CPT | Mod: CPTII,S$GLB,, | Performed by: NURSE PRACTITIONER

## 2025-01-17 PROCEDURE — 1159F MED LIST DOCD IN RCRD: CPT | Mod: CPTII,S$GLB,, | Performed by: NURSE PRACTITIONER

## 2025-01-17 PROCEDURE — 3079F DIAST BP 80-89 MM HG: CPT | Mod: CPTII,S$GLB,, | Performed by: NURSE PRACTITIONER

## 2025-01-17 PROCEDURE — 3008F BODY MASS INDEX DOCD: CPT | Mod: CPTII,S$GLB,, | Performed by: NURSE PRACTITIONER

## 2025-01-17 PROCEDURE — 99999 PR PBB SHADOW E&M-EST. PATIENT-LVL IV: CPT | Mod: PBBFAC,,, | Performed by: NURSE PRACTITIONER

## 2025-01-17 NOTE — PROGRESS NOTES
[facility] Note    Chief Complaint      Chief Complaint   Patient presents with    Fatigue       History of Present Illness      History of Present Illness    CHIEF COMPLAINT:  Felix presents today for annual exam and multiple complaints.    SLEEP AND FATIGUE:  He reports experiencing broken sleep and constant fatigue affecting his daily life. He describes intense, vivid dreaming during sleep, questioning if he is truly getting rest. A sleep study was previously ordered but not completed. He tried melatonin which was ineffective and expresses preference to avoid sleep medications.    ENT:  He reports a left ear issue present for approximately 1-1.5 months. He consulted a dermatologist who was unable to provide a definitive diagnosis. He denies any trauma to the ear including wrestling.    EXERCISE:  He walks frequently, achieving 15,000 steps daily but denies engaging in any cardiovascular exercise that elevates his heart rate.    CURRENT MEDICATIONS:  He takes blood pressure medication, aspirin 81mg, Celexa (citalopram), Nexium, and testosterone twice weekly.    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      ROS:  General: +fatigue  ENT: -tinnitus  Psychiatric: +sleep difficulty         Past Medical History:  Past Medical History:   Diagnosis Date    GERD (gastroesophageal reflux disease)     HTN (hypertension), benign 2/10/2023       Past Surgical History:   has a past surgical history that includes Hand surgery; Vasectomy; repair,tendon,distal proximal (Left, 12/8/2022); Arthroscopic repair of rotator cuff of shoulder (Left, 12/19/2024); Arthroscopy of shoulder with decompression of subacromial space (Left, 12/19/2024); and arthroscopy,shoulder,with biceps tenodesis (Left, 12/19/2024).    Family History:  family history is not on file.     Social History:  Social History     Tobacco Use    Smoking status: Never    Smokeless tobacco: Never   Substance Use  Topics    Alcohol use: Yes     Comment: occasionally    Drug use: No         Medications:  Outpatient Encounter Medications as of 1/17/2025   Medication Sig Note Dispense Refill    amLODIPine (NORVASC) 10 MG tablet Take 1 tablet (10 mg total) by mouth once daily. 12/12/2024: Take with a sip of water on am of surgery.   90 tablet 3    aspirin 81 MG Chew Chew and swallow 1 tablet (81 mg total) by mouth once daily. For 4 weeks.  28 tablet 0    citalopram (CELEXA) 20 MG tablet Take 20 mg by mouth once daily. 12/12/2024: Take with a sip of water on am of surgery.        docusate sodium (COLACE) 100 MG capsule Take 1 capsule (100 mg total) by mouth 2 (two) times daily as needed for Constipation. (Patient not taking: Reported on 1/3/2025)  20 capsule 0    esomeprazole magnesium (NEXIUM) 10 mg suspension Take 10 mg by mouth before breakfast. 12/12/2024: Take with a sip of water on am of surgery.          ibuprofen (ADVIL,MOTRIN) 600 MG tablet Take 1 tablet (600 mg total) by mouth every 8 (eight) hours as needed for Pain. 12/12/2024: Stop 7 days before surgery   20 tablet 0    losartan (COZAAR) 100 MG tablet TAKE 1 TABLET BY MOUTH EVERY DAY 12/12/2024: Hold the morning of surgery    90 tablet 3    multivitamin (THERAGRAN) tablet Take 1 tablet by mouth once daily. 12/12/2024: Stop 7 days before surgery        mupirocin (BACTROBAN) 2 % ointment Left earlobe (Patient not taking: Reported on 1/3/2025)  44 g 0    oxyCODONE-acetaminophen (PERCOCET)  mg per tablet Take 1 tablet by mouth every 4-6 hours as needed for pain. Take stool softener with this medication.  21 tablet 0    promethazine (PHENERGAN) 25 MG tablet Take 1 tablet (25 mg total) by mouth every 6 (six) hours as needed for Nausea. (Patient not taking: Reported on 1/3/2025)  8 tablet 0    sumatriptan (IMITREX) 25 MG Tab Take 2 tablets (50 mg total) by mouth every 6 (six) hours as needed (migraine). 12/12/2024: May Take if need on AM of surgery   15 tablet 0     traMADoL (ULTRAM) 50 mg tablet Take 1-2 tablets ( mg total) by mouth every 6 (six) hours as needed for Pain.  21 tablet 0    VENTOLIN HFA 90 mcg/actuation inhaler Inhale 2 puffs into the lungs every 4 (four) hours as needed for Shortness of Breath or Wheezing. (Patient not taking: Reported on 1/3/2025) 12/12/2024: May Take if need on AM of surgery         No facility-administered encounter medications on file as of 1/17/2025.       Allergies:  Review of patient's allergies indicates:  No Known Allergies    Health Maintenance:  Immunization History   Administered Date(s) Administered    Tdap 11/03/2024      Health Maintenance   Topic Date Due    Hepatitis C Screening  Never done    HIV Screening  Never done    Colorectal Cancer Screening  Never done    Influenza Vaccine (1) Never done    COVID-19 Vaccine (1 - 2024-25 season) Never done    Hemoglobin A1c (Diabetic Prevention Screening)  01/05/2027    Lipid Panel  01/05/2029    TETANUS VACCINE  11/03/2034    RSV Vaccine (Age 60+ and Pregnant patients) (1 - 1-dose 75+ series) 10/24/2053    Pneumococcal Vaccines (Age 0-49)  Aged Out        Physical Exam      Vital Signs  Pulse: 81  SpO2: 95 %  BP: (!) 142/88  BP Location: Right arm  Height and Weight  Weight: 80.3 kg (177 lb 0.5 oz)]    Physical Exam  Constitutional:       Appearance: He is well-developed.   HENT:      Head: Normocephalic and atraumatic.   Neck:      Thyroid: No thyromegaly.   Cardiovascular:      Rate and Rhythm: Normal rate and regular rhythm.      Heart sounds: No murmur heard.  Pulmonary:      Effort: Pulmonary effort is normal. No respiratory distress.      Breath sounds: Normal breath sounds.   Abdominal:      General: There is no distension.      Palpations: Abdomen is soft.      Tenderness: There is no abdominal tenderness.   Skin:     General: Skin is warm and dry.   Neurological:      Mental Status: He is alert and oriented to person, place, and time.   Psychiatric:         Behavior:  Behavior normal.          Laboratory:  CBC:  Recent Labs   Lab 01/05/24  0815 12/13/24  1019   WBC 3.25 L 3.74 L   RBC 5.38 5.18   Hemoglobin 16.3 16.4   Hematocrit 47.6 48.1   Platelets 228 210   MCV 89 93   MCH 30.3 31.7 H   MCHC 34.2 34.1     CMP:  Recent Labs   Lab 02/17/23  1011 01/05/24  0815 12/13/24  1019   Glucose 101 96 76   Calcium 9.5 9.2 9.6   Albumin 4.0 3.8 4.0   Total Protein 7.3 7.1 7.5   Sodium 140 140 138   Potassium 4.5 4.0 3.9   CO2 29 28 26   Chloride 103 105 102   BUN 16 17 13   Alkaline Phosphatase 69 58 61   ALT 33 36 37   AST 23 29 34   Total Bilirubin 0.5 0.7 0.6     URINALYSIS:       LIPIDS:  Recent Labs   Lab 02/17/23  1011 04/19/23  1212 01/05/24  0815   TSH 0.760 0.691 1.456   HDL  --   --  45   Cholesterol  --   --  192   Triglycerides  --   --  132   LDL Cholesterol  --   --  120.6   HDL/Cholesterol Ratio  --   --  23.4   Non-HDL Cholesterol  --   --  147   Total Cholesterol/HDL Ratio  --   --  4.3     TSH:  Recent Labs   Lab 02/17/23  1011 04/19/23  1212 01/05/24  0815   TSH 0.760 0.691 1.456     A1C:  Recent Labs   Lab 01/05/24  0815   Hemoglobin A1C 5.0       Assessment/Plan     Felix Stern is a 44 y.o.male with:    Assessment & Plan    IMPRESSION:  - Considered fatigue as primary complaint, noting recent sleep study order by Dr. Lara Schmitt  - Evaluated ear lesion, likely due to blunt trauma; ENT referral may be warranted for drainage or injection  - Assessed borderline hypertension; patient maxed out on current medications, may need to add diuretic  - Reviewed recent extensive workup for fatigue, all results normal    SLEEP APNEA:  - Evaluated the patient's reported symptoms of constant fatigue, broken sleep, and its impact on daily life and relationships.  - Suspected sleep apnea based on the patient's symptoms.  - Educated the patient about sleep hygiene practices, including: - Avoiding phone use 2 hours before bed - Limiting alcohol and exercise before sleep -  Stopping caffeine intake at noon  - Instructed the patient to implement these sleep hygiene practices to address sleep issues.  - Advised the patient to message Dr. Lara Schmitt through the patient portal to schedule a previously ordered home sleep study.    HYPERTENSION:  - Noted the patient's blood pressure is borderline high, with a trend of approaching the upper limit.  - Assessed that the patient is currently on maximum doses of amlodipine and losartan for blood pressure control.  - Discussed the potential addition of a diuretic to the current medication regimen, to be taken in the morning.  - Recommend weight loss of 5-10 lbs to potentially improve blood pressure control.  - Discussed diet and exercise habits, noting that the patient walks frequently but does not engage in heart rate-elevating exercise.  - Instructed the patient to check blood pressure at home or return in 2-4 weeks for re-evaluation.    WEIGHT MANAGEMENT:  - Noted that the patient is overweight.  - Discussed the impact of weight on blood pressure.  - Recommend a weight loss goal of 5-10 lbs to potentially improve blood pressure.  - Advised diet modification and increased exercise for weight management.  - Discussed impact of weight loss on blood pressure, noting 5-10 lbs could significantly reduce systolic pressure.  - Educated on American Heart Association's exercise recommendations: 150 minutes per week or 30 minutes 5 times per week.    EAR LESION:  - Evaluated a small, hardened lump on the patient's left ear, present for about 1-1.5 months.  - Noted that a previous provider diagnosed it as cellulitis of the left ear and prescribed antibiotics, which were ineffective.  - Referred the patient to an ENT specialist for evaluation and potential treatment, including drainage or injection of the ear lesion.    LABS:  - Ordered a fasting cholesterol panel as part of the annual preventive screening.  - Ordered comprehensive labs including: -  CBC - Kidney and liver function tests - Thyroid Stimulating Hormone (TSH) - Cholesterol panel (fasting required)  - Ordered Hepatitis C and HIV screening (once in lifetime).  - Instructed the patient to complete an at-home FIT kit for colorectal cancer screening.  - Advised the patient to return to the lab anytime for ordered labs (fasting required, open from 7:00 AM).    FLU V  ACCINATION:  - Discussed flu vaccine administration.    OTHER INSTRUCTIONS:  - Performed a general health assessment, including medication review, blood pressure check, and discussion of lifestyle factors.  - Conducted a physical exam, including auscultation of the patient's lungs.  - Reviewed and discussed various preventive health screenings appropriate for the patient's age.  - Noted that the patient is taking a baby aspirin related to recent surgery.  - Inquired about the patient's diet and water intake.  - Discussed the importance of diet in managing weight and blood pressure.    FOLLOW UP:  - Scheduled follow-up for next annual visit.         1. Annual physical exam  - CBC W/ AUTO DIFFERENTIAL; Future  - COMPREHENSIVE METABOLIC PANEL; Future  - TSH; Future  - LIPID PANEL; Future    2. Left ear pain  - Ambulatory referral/consult to ENT; Future    3. Gastroesophageal reflux disease without esophagitis    4. Chronic left shoulder pain    5. HTN (hypertension), benign    6. Encounter for hepatitis C screening test for low risk patient  - HEPATITIS C ANTIBODY; Future    7. Screening for HIV (human immunodeficiency virus)  - HIV 1/2 Ag/Ab (4th Gen); Future    8. Screening for malignant neoplasm of prostate       Chronic conditions status updated as per HPI.  Other than changes above, cont current medications and maintain follow up with specialists.  No follow-ups on file.    Future Appointments   Date Time Provider Department Center   1/22/2025  1:30 PM Vilma Dahl PTA [facility] [address]   1/29/2025 10:00 AM Vilma Dahl PTA [facility]  OP [facility] [place]   1/29/2025 11:45 AM Jossy Samayoa MD [facility]TS [place]   1/31/2025  8:00 AM Abigail Kulkarni, PT [facility] [place]   2/5/2025  9:00 AM Vilma Dahl, PTA [facility] [place]   2/7/2025  8:00 AM Abigail Kulkarni, PT [facility] [place]   2/12/2025  9:00 AM Vilma Dahl, PTA [facility] OP [facility] [place]   2/14/2025  8:00 AM Abigail Kulkarni, PT [facility] [place]       This note was generated with the assistance of ambient listening technology. Verbal consent was obtained by the patient and accompanying visitor(s) for the recording of patient appointment to facilitate this note. I attest to having reviewed and edited the generated note for accuracy, though some syntax or spelling errors may persist. Please contact the author of this note for any clarification.      Adrienne Cotaya, FNP [facility]

## 2025-01-17 NOTE — TELEPHONE ENCOUNTER
[facility] Chart Review & Patient Outreach Details For Medication Adherence Project    Reason for Outreach Encounter: 3rd Party payor non-compliance report ([employer], [employer], C, etc)  2.  Patient Outreach Method: Reviewed patient chart   3.   Medication in question:    Hypertension Medications               amLODIPine (NORVASC) 10 MG tablet Take 1 tablet (10 mg total) by mouth once daily.    losartan (COZAAR) 100 MG tablet TAKE 1 TABLET BY MOUTH EVERY DAY                   last filled  10/23/24 for 90 day supply      4.  Reviewed and or Updates Made To: Patient Chart  5. Outreach Outcomes and/or actions taken: Patient filled medication and is on track to be adherent  Additional Notes:

## 2025-01-20 DIAGNOSIS — S46.012A TRAUMATIC ROTATOR CUFF TEAR, LEFT, INITIAL ENCOUNTER: ICD-10-CM

## 2025-01-21 RX ORDER — TRAMADOL HYDROCHLORIDE 50 MG/1
50-100 TABLET ORAL EVERY 8 HOURS PRN
Qty: 21 TABLET | Refills: 0 | Status: SHIPPED | OUTPATIENT
Start: 2025-01-21

## 2025-01-21 RX ORDER — OXYCODONE AND ACETAMINOPHEN 10; 325 MG/1; MG/1
TABLET ORAL
Qty: 21 TABLET | Refills: 0 | Status: SHIPPED | OUTPATIENT
Start: 2025-01-21

## 2025-01-25 ENCOUNTER — PATIENT MESSAGE (OUTPATIENT)
Dept: SLEEP MEDICINE | Facility: CLINIC | Age: 45
End: 2025-01-25
Payer: COMMERCIAL

## 2025-01-29 ENCOUNTER — OFFICE VISIT (OUTPATIENT)
Dept: SPORTS MEDICINE | Facility: CLINIC | Age: 45
End: 2025-01-29
Payer: COMMERCIAL

## 2025-01-29 ENCOUNTER — CLINICAL SUPPORT (OUTPATIENT)
Dept: REHABILITATION | Facility: HOSPITAL | Age: 45
End: 2025-01-29
Payer: COMMERCIAL

## 2025-01-29 VITALS
SYSTOLIC BLOOD PRESSURE: 138 MMHG | BODY MASS INDEX: 27.91 KG/M2 | DIASTOLIC BLOOD PRESSURE: 90 MMHG | HEART RATE: 89 BPM | WEIGHT: 177.81 LBS | HEIGHT: 67 IN

## 2025-01-29 DIAGNOSIS — M25.612 DECREASED RANGE OF MOTION OF SHOULDER, LEFT: ICD-10-CM

## 2025-01-29 DIAGNOSIS — Z98.890 S/P ROTATOR CUFF SURGERY: Primary | ICD-10-CM

## 2025-01-29 DIAGNOSIS — M25.612 DECREASED RANGE OF MOTION OF LEFT SHOULDER: Primary | ICD-10-CM

## 2025-01-29 DIAGNOSIS — R29.898 DECREASED STRENGTH OF UPPER EXTREMITY: ICD-10-CM

## 2025-01-29 PROCEDURE — 97112 NEUROMUSCULAR REEDUCATION: CPT | Mod: CQ

## 2025-01-29 PROCEDURE — 97140 MANUAL THERAPY 1/> REGIONS: CPT | Mod: CQ

## 2025-01-29 PROCEDURE — 99999 PR PBB SHADOW E&M-EST. PATIENT-LVL III: CPT | Mod: PBBFAC,,, | Performed by: ORTHOPAEDIC SURGERY
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
## 2025-01-29 NOTE — PROGRESS NOTES
Chief Complaint: left shoulder pain     HISTORY OF PRESENT ILLNESS:   Pt is here today for post-operative followup of shoulder arthroscopy.  He is doing well.  We have reviewed patient's findings and discussed plan of care and future treatment options.      Tolerating pain well.   Wearing sling which is in place.  Rates pain at 0/10  No issues reported.     DATE OF PROCEDURE: 12/19/2024  SURGEON: Jossy Samayoa M.D.  OPERATION:   left  1. Shoulder arthroscopic rotator cuff repair 50 x 25 mm, large, double row (CPT 26465)  (complex -22 modifier)  2. Shoulder arthroscopic biceps tenodesis (CPT 90534)  3. Shoulder arthroscopic subacromial decompression, bursectomy   4. Shoulder arthroscopic extensive debridement (anterior, posterior glenohumeral joint, subacromial space) (CPT 80336)  5. Shoulder arthroscopic labral debridement (CPT 73528)  6. Shoulder arthroscopic microfracture (Crimson duvet technique) (CPT 31882)  7. Shoulder arthroscopic lysis of adhesions (CPT 45305)       PHYSICAL EXAMINATION:     Incision sites healed well  No evidence of any erythema, infection or induration  elbow Range of motion full   Minimal effusion  2+ Radial pulses  Capillary refill < 3secs  No swelling  FE 80  ER 0 45  IR L1  scaption 5/5 at 0 and 30                                                                                 ASSESSMENT:                                                                                                                                               1. Status post above, doing well.                                                                                                                               PLAN:                                                                                                                                                     1. PT, Discussed with PT.  2. Emphasized scapular function.  3. I have discussed return to activity in detail.  4. Patient will see us back in 6 weeks.                                       5. Discussed case with PT.    All questions were answered, surgical technique was reviewed and interpreted, and patient should contact us with any questions or concerns in the interim.

## 2025-01-31 ENCOUNTER — CLINICAL SUPPORT (OUTPATIENT)
Dept: REHABILITATION | Facility: HOSPITAL | Age: 45
End: 2025-01-31
Payer: COMMERCIAL

## 2025-01-31 DIAGNOSIS — M25.612 DECREASED RANGE OF MOTION OF LEFT SHOULDER: Primary | ICD-10-CM

## 2025-01-31 DIAGNOSIS — M25.612 DECREASED RANGE OF MOTION OF SHOULDER, LEFT: ICD-10-CM

## 2025-01-31 DIAGNOSIS — R29.898 DECREASED STRENGTH OF UPPER EXTREMITY: ICD-10-CM

## 2025-01-31 PROCEDURE — 97110 THERAPEUTIC EXERCISES: CPT

## 2025-01-31 PROCEDURE — 97112 NEUROMUSCULAR REEDUCATION: CPT

## 2025-01-31 PROCEDURE — 97140 MANUAL THERAPY 1/> REGIONS: CPT
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
## 2025-02-03 ENCOUNTER — HOSPITAL ENCOUNTER (OUTPATIENT)
Dept: SLEEP MEDICINE | Facility: HOSPITAL | Age: 45
Discharge: HOME | End: 2025-02-03
Attending: PSYCHIATRY & NEUROLOGY
Payer: COMMERCIAL

## 2025-02-03 DIAGNOSIS — G47.33 OSA (OBSTRUCTIVE SLEEP APNEA): Primary | ICD-10-CM

## 2025-02-03 DIAGNOSIS — G47.30 SLEEP APNEA, UNSPECIFIED TYPE: ICD-10-CM

## 2025-02-03 PROCEDURE — 95800 SLP STDY UNATTENDED: CPT

## 2025-02-05 ENCOUNTER — CLINICAL SUPPORT (OUTPATIENT)
Dept: REHABILITATION | Facility: HOSPITAL | Age: 45
End: 2025-02-05
Payer: COMMERCIAL

## 2025-02-05 DIAGNOSIS — M25.612 DECREASED RANGE OF MOTION OF LEFT SHOULDER: Primary | ICD-10-CM

## 2025-02-05 DIAGNOSIS — M25.612 DECREASED RANGE OF MOTION OF SHOULDER, LEFT: ICD-10-CM

## 2025-02-05 DIAGNOSIS — R29.898 DECREASED STRENGTH OF UPPER EXTREMITY: ICD-10-CM

## 2025-02-05 PROCEDURE — 97112 NEUROMUSCULAR REEDUCATION: CPT | Mod: CQ

## 2025-02-05 PROCEDURE — 97140 MANUAL THERAPY 1/> REGIONS: CPT | Mod: CQ

## 2025-02-05 PROCEDURE — 97110 THERAPEUTIC EXERCISES: CPT | Mod: CQ

## 2025-02-05 NOTE — PROGRESS NOTES
[facility]   Physical Therapy Treatment Note     Name: Felix Issa [facility]  Clinic Number: [id number]    Therapy Diagnosis:   Encounter Diagnoses   Name Primary?    Decreased range of motion of left shoulder Yes    Decreased strength of upper extremity     Decreased range of motion of shoulder, left      Physician: Marcin Viera III, *    Visit Date: 2/5/2025  Physician Orders: PT Eval and Treat  Medical Diagnosis from Referral: Traumatic rotator cuff tear, left, initial encounter [S46.012A]   Evaluation Date: 1/16/2025  Authorization Period Expiration: 12/31/2025  Plan of Care Expiration: 04/10/2025  Progress Note Due: 02/13/2025  Visit # / Visits authorized: 3/ 20  FOTO Intake: #1 given on eval   FOTO Follow Up:   FOTO Follow UP:      Time In: 09:00 am  Time Out: 1000 am  Total Billable Time: 60 minutes   PT tech extender used  DOS: 12/19/2024  S/p: left  1. Shoulder arthroscopic rotator cuff repair 50 x 25 mm, large, double row (CPT 38898)  (complex -22 modifier)  2. Shoulder arthroscopic biceps tenodesis (CPT 57501)  3. Shoulder arthroscopic subacromial decompression, bursectomy   4. Shoulder arthroscopic extensive debridement (anterior, posterior glenohumeral joint, subacromial space) (CPT 52617)  5. Shoulder arthroscopic labral debridement (CPT 47488)  6. Shoulder arthroscopic microfracture (Crimson duvet technique) (CPT 46303)  7. Shoulder arthroscopic lysis of adhesions (CPT 63952)     Post-Op Precautions: We will follow the arthroscopic rotator cuff repair guidelines for a large size rotator cuff tear.  We discussed with the patient's family after surgery.  The patient will remain in a sling for 6 weeks.  PT to start at 4 weeks.         Precautions: Standard and post-op      SUBJECTIVE     Pt reports: shoulder is feeling better     He was compliant with home exercise program.  Response to previous treatment: none  Functional change: on going    Pain: 2/10  Location: left  "shoulder      OBJECTIVE     Objective Measures updated at progress report unless specified.     6 weeks and 6 day s/p as of 2/5    Shoulder Passive Range of Motion within Protcol Limits:     Right Left   Flexion    185 115   ER at 0    60 45   ER at 90    90 NT   IR    35  NT     L AROM  FE: 90 deg prior to shrug    Treatment       Felix received the treatments listed below:      Therapeutic exercises to develop strength, endurance, ROM, flexibility, posture, and core stabilization for 20 minutes including:  Wand ER at 20 deg scap and 90 30x  Wand chest press 20x  Wand chest press to OH 3 x 10    Manual therapy techniques: Joint mobilizations were applied to the: L shoulder for 10 minutes, including:  Glenohumeral post glide Gr 3  Glenohumeral inf glide Gr 3  PROM within precautions    Neuromuscular re-education activities to improve: Balance, Coordination, Kinesthetic, Sense, Proprioception, and Posture for 30 minutes. The following activities were included:  Prone row 3x10 5" hold  Prone shoulder ext 3x10 5" hold  EOT FL walk backs 5" hold x20  ER/IR Walkouts OTB 3x12    Therapeutic activities to improve functional performance for 00 minutes, including:          Patient Education and Home Exercises     Home Exercises Provided and Patient Education Provided     Education provided:   - Cont HEP    Written Home Exercises Provided: Patient instructed to cont prior HEP. Exercises were reviewed and Felix was able to demonstrate them prior to the end of the session.  Felix demonstrated good  understanding of the education provided. See EMR under Patient Instructions for exercises provided during therapy sessions    ASSESSMENT     Felix's lateral symptoms are improving. Continued with AAROM exercises to improve shoulder NM control as well as strength. Good scapular control with prone exercises. Challenged with cuff walkouts.   Felix Is progressing well towards his goals.     Pt prognosis is Good.     Pt will continue to " benefit from skilled outpatient physical therapy to address the deficits listed in the problem list box on initial evaluation, provide pt/family education and to maximize pt's level of independence in the home and community environment.     Pt's spiritual, cultural and educational needs considered and pt agreeable to plan of care and goals.     Anticipated barriers to physical therapy: none    Goals:   Short Term Goals: 6 weeks  1. Pt will be compliant with HEP 50% of prescribed amount. MET  2. The pt to demo improvement in L shoulder PROM to by 80% of the R MET  3.  The pt to demo tolerance to being out of the sling for 24 hours with pain <2/10 MET     Long Term Goals: 24 weeks   Pt will be compliant with % of prescribed amount. (Progressing, not met)  Pt will score 35% or better on FOTO Shoulder Mobility(Progressing, not met)  The pt to improvement in AROM of L shoulder within 80% of R shoulder to improve tolerance to OH movement (Progressing, not met)  The pt to  Demo at least 4+/5 of RTC muscle testing to demo improvement in tolerance to activity(Progressing, not met)  The pt to tolerate lifting 10# overhead to improve tolerance to ADLs and work related activities (Progressing, not met)  The pt will report full participation in ADLs and IADLs without restrictions related to L Shoulder.  (Progressing, not met)    PLAN     Plan of care Certification: 1/16/2025 to 04/10/2025.     Outpatient Physical Therapy 2 times weekly for 12 weeks to include the following interventions: Manual Therapy, Moist Heat/ Ice, Neuromuscular Re-ed, Patient Education, Therapeutic Activites, and Therapeutic Exercise.     Merly Dahl PTA,

## 2025-02-06 ENCOUNTER — PATIENT MESSAGE (OUTPATIENT)
Dept: PRIMARY CARE CLINIC | Facility: CLINIC | Age: 45
End: 2025-02-06
Payer: COMMERCIAL

## 2025-02-07 ENCOUNTER — TELEPHONE (OUTPATIENT)
Dept: SPORTS MEDICINE | Facility: CLINIC | Age: 45
End: 2025-02-07
Payer: COMMERCIAL

## 2025-02-07 ENCOUNTER — CLINICAL SUPPORT (OUTPATIENT)
Dept: REHABILITATION | Facility: HOSPITAL | Age: 45
End: 2025-02-07
Payer: COMMERCIAL

## 2025-02-07 DIAGNOSIS — M25.612 DECREASED RANGE OF MOTION OF SHOULDER, LEFT: ICD-10-CM

## 2025-02-07 DIAGNOSIS — S46.012A TRAUMATIC ROTATOR CUFF TEAR, LEFT, INITIAL ENCOUNTER: ICD-10-CM

## 2025-02-07 DIAGNOSIS — R29.898 DECREASED STRENGTH OF UPPER EXTREMITY: ICD-10-CM

## 2025-02-07 DIAGNOSIS — M25.612 DECREASED RANGE OF MOTION OF LEFT SHOULDER: Primary | ICD-10-CM

## 2025-02-07 PROCEDURE — 97112 NEUROMUSCULAR REEDUCATION: CPT

## 2025-02-07 PROCEDURE — 97110 THERAPEUTIC EXERCISES: CPT

## 2025-02-07 RX ORDER — OXYCODONE AND ACETAMINOPHEN 10; 325 MG/1; MG/1
TABLET ORAL
Qty: 7 TABLET | Refills: 0 | Status: SHIPPED | OUTPATIENT
Start: 2025-02-07

## 2025-02-07 RX ORDER — TRAMADOL HYDROCHLORIDE 50 MG/1
50-100 TABLET ORAL EVERY 8 HOURS PRN
Qty: 30 TABLET | Refills: 0 | Status: SHIPPED | OUTPATIENT
Start: 2025-02-07

## 2025-02-07 RX ORDER — CITALOPRAM 20 MG/1
20 TABLET, FILM COATED ORAL DAILY
Qty: 90 TABLET | Refills: 1 | Status: SHIPPED | OUTPATIENT
Start: 2025-02-07

## 2025-02-07 NOTE — PROGRESS NOTES
[facility]   Physical Therapy Treatment Note     Name: Felix Issa [facility]  Clinic Number: [id number]    Therapy Diagnosis:   Encounter Diagnoses   Name Primary?    Decreased range of motion of left shoulder Yes    Decreased strength of upper extremity     Decreased range of motion of shoulder, left      Physician: Marcin Viera III, *    Visit Date: 2/7/2025  Physician Orders: PT Eval and Treat  Medical Diagnosis from Referral: Traumatic rotator cuff tear, left, initial encounter [S46.012A]   Evaluation Date: 1/16/2025  Authorization Period Expiration: 12/31/2025  Plan of Care Expiration: 04/10/2025  Progress Note Due: 02/13/2025  Visit # / Visits authorized: 3/ 20  FOTO Intake: #1 given on eval   FOTO Follow Up:   FOTO Follow UP:      Time In: 0812 am (pt late)  Time Out: 0900 am  Total Billable Time: 48 minutes   PT tech extender used  DOS: 12/19/2024  S/p: left  1. Shoulder arthroscopic rotator cuff repair 50 x 25 mm, large, double row (CPT 37632)  (complex -22 modifier)  2. Shoulder arthroscopic biceps tenodesis (CPT 49632)  3. Shoulder arthroscopic subacromial decompression, bursectomy   4. Shoulder arthroscopic extensive debridement (anterior, posterior glenohumeral joint, subacromial space) (CPT 99376)  5. Shoulder arthroscopic labral debridement (CPT 48211)  6. Shoulder arthroscopic microfracture (Crimson duvet technique) (CPT 33736)  7. Shoulder arthroscopic lysis of adhesions (CPT 08028)     Post-Op Precautions: We will follow the arthroscopic rotator cuff repair guidelines for a large size rotator cuff tear.  We discussed with the patient's family after surgery.  The patient will remain in a sling for 6 weeks.  PT to start at 4 weeks.         Precautions: Standard and post-op      SUBJECTIVE     Pt reports: shoulder feels better overall and like he can move it more    He was compliant with home exercise program.  Response to previous treatment: none  Functional change: on  "going    Pain: 2/10  Location: left shoulder      OBJECTIVE     Objective Measures updated at progress report unless specified.     6 weeks and 6 day s/p as of 2/5    Shoulder Passive Range of Motion within Protcol Limits:     Right Left   Flexion    185 115   ER at 0    60 45   ER at 90    90 NT   IR    35  NT     L AROM  FE: 90 deg prior to shrug    Treatment       Felix received the treatments listed below:      Therapeutic exercises to develop strength, endurance, ROM, flexibility, posture, and core stabilization for 25 minutes including:  Lat walkbacks; 15" x 10  Wand ER at 20 deg scap and 90 30x  Wand chest press 20x  Wand chest press to OH 3 x 10    Manual therapy techniques: Joint mobilizations were applied to the: L shoulder for 05 minutes, including:  Glenohumeral post glide Gr 3  Glenohumeral inf glide Gr 3  PROM within precautions    Neuromuscular re-education activities to improve: Balance, Coordination, Kinesthetic, Sense, Proprioception, and Posture for 30 minutes. The following activities were included:  ER/IR Walkouts GTB 3x10  SL flexion with dowel 3 x 10   SL ER to neutral 0#; 3 x 10 x 5"   A walkouts; OTB: 3 x 10   SA wall slide 3 x 10    NP Today:  Prone shoulder ext 3x10 5" hold  Prone row 3x10 5" hold    Therapeutic activities to improve functional performance for 00 minutes, including:          Patient Education and Home Exercises     Home Exercises Provided and Patient Education Provided     Education provided:   - Cont HEP    Written Home Exercises Provided: Patient instructed to cont prior HEP. Exercises were reviewed and Felix was able to demonstrate them prior to the end of the session.  Felix demonstrated good  understanding of the education provided. See EMR under Patient Instructions for exercises provided during therapy sessions    ASSESSMENT     Felix presented with no lateral symptoms today and good response to progression in rotator cuff and periscapular activaiton. He will " benefit continued progressive loading as able.     Felix Is progressing well towards his goals.     Pt prognosis is Good.     Pt will continue to benefit from skilled outpatient physical therapy to address the deficits listed in the problem list box on initial evaluation, provide pt/family education and to maximize pt's level of independence in the home and community environment.     Pt's spiritual, cultural and educational needs considered and pt agreeable to plan of care and goals.     Anticipated barriers to physical therapy: none    Goals:   Short Term Goals: 6 weeks  1. Pt will be compliant with HEP 50% of prescribed amount. MET  2. The pt to demo improvement in L shoulder PROM to by 80% of the R MET  3.  The pt to demo tolerance to being out of the sling for 24 hours with pain <2/10 MET     Long Term Goals: 24 weeks   Pt will be compliant with % of prescribed amount. (Progressing, not met)  Pt will score 35% or better on FOTO Shoulder Mobility(Progressing, not met)  The pt to improvement in AROM of L shoulder within 80% of R shoulder to improve tolerance to OH movement (Progressing, not met)  The pt to  Demo at least 4+/5 of C muscle testing to demo improvement in tolerance to activity(Progressing, not met)  The pt to tolerate lifting 10# overhead to improve tolerance to ADLs and work related activities (Progressing, not met)  The pt will report full participation in ADLs and IADLs without restrictions related to L Shoulder.  (Progressing, not met)    PLAN     Plan of care Certification: 1/16/2025 to 04/10/2025.     Outpatient Physical Therapy 2 times weekly for 12 weeks to include the following interventions: Manual Therapy, Moist Heat/ Ice, Neuromuscular Re-ed, Patient Education, Therapeutic Activites, and Therapeutic Exercise.     Abigail Kulkarni, PT, DPT, SCS

## 2025-02-07 NOTE — TELEPHONE ENCOUNTER
Pt states that he has been prescribed Celexa by psychiatry but now his rx is out and the psychiatrist is not responding and not attending scheduled VV. Pt asking if PCP would take over rx. Pended if appropriate.     LOV with Vilma Muñoz MD , 1/4/2024

## 2025-02-07 NOTE — TELEPHONE ENCOUNTER
Care Due:                  Date            Visit Type   Department     Provider  --------------------------------------------------------------------------------                                MYCHART                              FOLLOWUP/OF  [facility]  Last Visit: 01-      [facility] VISIT   BEAU Mñuoz  Next Visit: None Scheduled  None         None Found                                                            Last  Test          Frequency    Reason                     Performed    Due Date  --------------------------------------------------------------------------------    Office Visit  15 months..  amLODIPine, losartan,      01- 03-                             sumatriptan..............    [employer] Embedded Care Due Messages. Reference number: [id number].   2/07/2025 8:51:39 AM CST

## 2025-02-12 ENCOUNTER — PATIENT MESSAGE (OUTPATIENT)
Dept: SLEEP MEDICINE | Facility: CLINIC | Age: 45
End: 2025-02-12

## 2025-02-12 ENCOUNTER — PATIENT MESSAGE (OUTPATIENT)
Dept: SLEEP MEDICINE | Facility: CLINIC | Age: 45
End: 2025-02-12
Payer: COMMERCIAL

## 2025-02-12 DIAGNOSIS — G47.33 OSA (OBSTRUCTIVE SLEEP APNEA): Primary | ICD-10-CM

## 2025-02-12 PROBLEM — G47.30 SLEEP APNEA: Status: ACTIVE | Noted: 2025-02-12

## 2025-02-12 PROCEDURE — G0400 HOME SLEEP TEST/TYPE 4 PORTA: HCPCS | Mod: 26,,, | Performed by: PSYCHIATRY & NEUROLOGY

## 2025-02-14 ENCOUNTER — CLINICAL SUPPORT (OUTPATIENT)
Dept: REHABILITATION | Facility: HOSPITAL | Age: 45
End: 2025-02-14
Payer: COMMERCIAL

## 2025-02-14 DIAGNOSIS — R29.898 DECREASED STRENGTH OF UPPER EXTREMITY: ICD-10-CM

## 2025-02-14 DIAGNOSIS — M25.612 DECREASED RANGE OF MOTION OF SHOULDER, LEFT: ICD-10-CM

## 2025-02-14 DIAGNOSIS — M25.612 DECREASED RANGE OF MOTION OF LEFT SHOULDER: Primary | ICD-10-CM

## 2025-02-14 PROCEDURE — 97112 NEUROMUSCULAR REEDUCATION: CPT

## 2025-02-14 PROCEDURE — 97110 THERAPEUTIC EXERCISES: CPT

## 2025-02-14 NOTE — PROGRESS NOTES
[facility]   Physical Therapy Treatment Note     Name: Felix Issa [facility]  Clinic Number: [id number]    Therapy Diagnosis:   Encounter Diagnoses   Name Primary?    Decreased range of motion of left shoulder Yes    Decreased strength of upper extremity     Decreased range of motion of shoulder, left        Physician: Marcin Viera III, *    Visit Date: 2/14/2025  Physician Orders: PT Eval and Treat  Medical Diagnosis from Referral: Traumatic rotator cuff tear, left, initial encounter [S46.012A]   Evaluation Date: 1/16/2025  Authorization Period Expiration: 12/31/2025  Plan of Care Expiration: 04/10/2025  Progress Note Due: 02/13/2025  Visit # / Visits authorized: 5/ 20  FOTO Intake: #1 given on eval   FOTO Follow Up:   FOTO Follow UP:      Time In: 0810 am (pt late)  Time Out: 0900 am  Total Billable Time: 50 minutes   PT tech extender used  DOS: 12/19/2024  S/p: left  1. Shoulder arthroscopic rotator cuff repair 50 x 25 mm, large, double row (CPT 29591)  (complex -22 modifier)  2. Shoulder arthroscopic biceps tenodesis (CPT 01435)  3. Shoulder arthroscopic subacromial decompression, bursectomy   4. Shoulder arthroscopic extensive debridement (anterior, posterior glenohumeral joint, subacromial space) (CPT 44215)  5. Shoulder arthroscopic labral debridement (CPT 65053)  6. Shoulder arthroscopic microfracture (Crimson duvet technique) (CPT 15914)  7. Shoulder arthroscopic lysis of adhesions (CPT 88262)     Post-Op Precautions: We will follow the arthroscopic rotator cuff repair guidelines for a large size rotator cuff tear.  We discussed with the patient's family after surgery.  The patient will remain in a sling for 6 weeks.  PT to start at 4 weeks.         Precautions: Standard and post-op      SUBJECTIVE     Pt reports: feels good, but notices how much he shrugs when he tries to lift it    He was compliant with home exercise program.  Response to previous treatment:  "none  Functional change: on going    Pain: 2/10  Location: left shoulder      OBJECTIVE     Objective Measures updated at progress report unless specified.     6 weeks and 6 day s/p as of 2/5    Shoulder Passive Range of Motion within Protcol Limits:     Right Left   Flexion    185 115   ER at 0    60 45   ER at 90    90 NT   IR    35  NT     L AROM  FE: 90 deg prior to shrug    Treatment       Felix received the treatments listed below:      Therapeutic exercises to develop strength, endurance, ROM, flexibility, posture, and core stabilization for 25 minutes including:  Lat walkbacks; 15" x 10  Wand ER at 20 deg scap and 90 30x  Wand chest press 20x  Wand chest press to OH 3 x 10    Manual therapy techniques: Joint mobilizations were applied to the: L shoulder for 00 minutes, including:  Glenohumeral post glide Gr 3  Glenohumeral inf glide Gr 3  PROM within precautions    Neuromuscular re-education activities to improve: Balance, Coordination, Kinesthetic, Sense, Proprioception, and Posture for 25 minutes. The following activities were included:  ER/IR Walkouts O/GTB 3x15  SL ER to neutral 0#; 3 x 10 x 5"   Landmine 3 x 15    NP Today:  Prone shoulder ext 3x10 5" hold  Prone row 3x10 5" hold  A walkouts; OTB: 3 x 10   SA wall slide 3 x 10  SL flexion with dowel 3 x 10     Therapeutic activities to improve functional performance for 00 minutes, including:          Patient Education and Home Exercises     Home Exercises Provided and Patient Education Provided     Education provided:   - Cont HEP    Written Home Exercises Provided: Patient instructed to cont prior HEP. Exercises were reviewed and Felix was able to demonstrate them prior to the end of the session.  Felix demonstrated good  understanding of the education provided. See EMR under Patient Instructions for exercises provided during therapy sessions    ASSESSMENT     Felix is progressing well in RTC loading and improving shrug control in landmine press. He " will benefit reinforcement of this movement pattern     Felix Is progressing well towards his goals.     Pt prognosis is Good.     Pt will continue to benefit from skilled outpatient physical therapy to address the deficits listed in the problem list box on initial evaluation, provide pt/family education and to maximize pt's level of independence in the home and community environment.     Pt's spiritual, cultural and educational needs considered and pt agreeable to plan of care and goals.     Anticipated barriers to physical therapy: none    Goals:   Short Term Goals: 6 weeks  1. Pt will be compliant with HEP 50% of prescribed amount. MET  2. The pt to demo improvement in L shoulder PROM to by 80% of the R MET  3.  The pt to demo tolerance to being out of the sling for 24 hours with pain <2/10 MET     Long Term Goals: 24 weeks   Pt will be compliant with % of prescribed amount. (Progressing, not met)  Pt will score 35% or better on FOTO Shoulder Mobility(Progressing, not met)  The pt to improvement in AROM of L shoulder within 80% of R shoulder to improve tolerance to OH movement (Progressing, not met)  The pt to  Demo at least 4+/5 of C muscle testing to demo improvement in tolerance to activity(Progressing, not met)  The pt to tolerate lifting 10# overhead to improve tolerance to ADLs and work related activities (Progressing, not met)  The pt will report full participation in ADLs and IADLs without restrictions related to L Shoulder.  (Progressing, not met)    PLAN     Plan of care Certification: 1/16/2025 to 04/10/2025.     Outpatient Physical Therapy 2 times weekly for 12 weeks to include the following interventions: Manual Therapy, Moist Heat/ Ice, Neuromuscular Re-ed, Patient Education, Therapeutic Activites, and Therapeutic Exercise.     Abigail Kulkarni, PT, DPT, SCS

## 2025-02-19 ENCOUNTER — CLINICAL SUPPORT (OUTPATIENT)
Dept: REHABILITATION | Facility: HOSPITAL | Age: 45
End: 2025-02-19
Payer: COMMERCIAL

## 2025-02-19 DIAGNOSIS — M25.612 DECREASED RANGE OF MOTION OF SHOULDER, LEFT: ICD-10-CM

## 2025-02-19 DIAGNOSIS — R29.898 DECREASED STRENGTH OF UPPER EXTREMITY: ICD-10-CM

## 2025-02-19 DIAGNOSIS — M25.612 DECREASED RANGE OF MOTION OF LEFT SHOULDER: Primary | ICD-10-CM

## 2025-02-19 PROCEDURE — 97140 MANUAL THERAPY 1/> REGIONS: CPT

## 2025-02-19 PROCEDURE — 97110 THERAPEUTIC EXERCISES: CPT

## 2025-02-19 PROCEDURE — 97112 NEUROMUSCULAR REEDUCATION: CPT

## 2025-02-19 NOTE — PROGRESS NOTES
[facility]   Physical Therapy Treatment Note     Name: Felix Issa [facility]  Clinic Number: [id number]    Therapy Diagnosis:   Encounter Diagnoses   Name Primary?    Decreased range of motion of left shoulder Yes    Decreased strength of upper extremity     Decreased range of motion of shoulder, left        Physician: Marcin Viera III, *    Visit Date: 2/19/2025  Physician Orders: PT Eval and Treat  Medical Diagnosis from Referral: Traumatic rotator cuff tear, left, initial encounter [S46.012A]   Evaluation Date: 1/16/2025  Authorization Period Expiration: 12/31/2025  Plan of Care Expiration: 04/10/2025  Progress Note Due: 02/13/2025  Visit # / Visits authorized: 6/ 20  FOTO Intake: #1 given on eval   FOTO Follow Up:   FOTO Follow UP:      Time In: 1000 am   Time Out: 1100 am  Total Billable Time: 60 minutes   PT tech extender used  DOS: 12/19/2024  S/p: left  1. Shoulder arthroscopic rotator cuff repair 50 x 25 mm, large, double row (CPT 53293)  (complex -22 modifier)  2. Shoulder arthroscopic biceps tenodesis (CPT 14509)  3. Shoulder arthroscopic subacromial decompression, bursectomy   4. Shoulder arthroscopic extensive debridement (anterior, posterior glenohumeral joint, subacromial space) (CPT 58624)  5. Shoulder arthroscopic labral debridement (CPT 98266)  6. Shoulder arthroscopic microfracture (Crimson duvet technique) (CPT 35143)  7. Shoulder arthroscopic lysis of adhesions (CPT 97730)     Post-Op Precautions: We will follow the arthroscopic rotator cuff repair guidelines for a large size rotator cuff tear.  We discussed with the patient's family after surgery.  The patient will remain in a sling for 6 weeks.  PT to start at 4 weeks.         Precautions: Standard and post-op      SUBJECTIVE     Pt reports: shoulder feels good no issues    He was compliant with home exercise program.  Response to previous treatment: none  Functional change: on going    Pain: 2/10  Location:  "left shoulder      OBJECTIVE     Objective Measures updated at progress report unless specified.     8 weeks and 6 day s/p as of 2/19    Shoulder Passive Range of Motion within Protcol Limits:     Right Left   Flexion    185 115   ER at 0    60 55   ER at 90    90 65   IR    35  NT     L AROM  FE: 90 deg prior to shrug    Treatment       Felix received the treatments listed below:      Therapeutic exercises to develop strength, endurance, ROM, flexibility, posture, and core stabilization for 23 minutes including:  Lat walkbacks; 15" x 10  Wand ER at 20 deg scap and 90 30x  Wand chest press 20x  Wand chest press to OH 3 x 10    Manual therapy techniques: Joint mobilizations were applied to the: L shoulder for 10 minutes, including:  Glenohumeral post glide Gr 3  Glenohumeral inf glide Gr 3  PROM within precautions    Neuromuscular re-education activities to improve: Balance, Coordination, Kinesthetic, Sense, Proprioception, and Posture for 27 minutes. The following activities were included:  ER/IR Walkouts GTB 3 x 10   SA wall slide 3 x 10   SL ER to neutral 0#; 3 x 10 x 5"  Landmine 3 x 15  SL flexion with dowel 3 x 10     NP Today:  Prone shoulder ext 3x10 5" hold  Prone row 3x10 5" hold  A walkouts; OTB: 3 x 10       Therapeutic activities to improve functional performance for 00 minutes, including:          Patient Education and Home Exercises     Home Exercises Provided and Patient Education Provided     Education provided:   - Cont HEP    Written Home Exercises Provided: Patient instructed to cont prior HEP. Exercises were reviewed and Felix was able to demonstrate them prior to the end of the session.  Felix demonstrated good  understanding of the education provided. See EMR under Patient Instructions for exercises provided during therapy sessions    ASSESSMENT     Felix demonstrated excellent passive range of motion for this stage and the size of his repair. He will benefit continued emphasis RTC and " periscapular training to decreased notable shrug sign.     Felix Is progressing well towards his goals.     Pt prognosis is Good.     Pt will continue to benefit from skilled outpatient physical therapy to address the deficits listed in the problem list box on initial evaluation, provide pt/family education and to maximize pt's level of independence in the home and community environment.     Pt's spiritual, cultural and educational needs considered and pt agreeable to plan of care and goals.     Anticipated barriers to physical therapy: none    Goals:   Short Term Goals: 6 weeks  1. Pt will be compliant with HEP 50% of prescribed amount. MET  2. The pt to demo improvement in L shoulder PROM to by 80% of the R MET  3.  The pt to demo tolerance to being out of the sling for 24 hours with pain <2/10 MET     Long Term Goals: 24 weeks   Pt will be compliant with % of prescribed amount. (Progressing, not met)  Pt will score 35% or better on FOTO Shoulder Mobility(Progressing, not met)  The pt to improvement in AROM of L shoulder within 80% of R shoulder to improve tolerance to OH movement (Progressing, not met)  The pt to  Demo at least 4+/5 of C muscle testing to demo improvement in tolerance to activity(Progressing, not met)  The pt to tolerate lifting 10# overhead to improve tolerance to ADLs and work related activities (Progressing, not met)  The pt will report full participation in ADLs and IADLs without restrictions related to L Shoulder.  (Progressing, not met)    PLAN     Plan of care Certification: 1/16/2025 to 04/10/2025.     Outpatient Physical Therapy 2 times weekly for 12 weeks to include the following interventions: Manual Therapy, Moist Heat/ Ice, Neuromuscular Re-ed, Patient Education, Therapeutic Activites, and Therapeutic Exercise.     Abigail Kulkarni, PT, DPT, SCS

## 2025-02-25 ENCOUNTER — TELEPHONE (OUTPATIENT)
Dept: PHARMACY | Facility: CLINIC | Age: 45
End: 2025-02-25
Payer: COMMERCIAL

## 2025-02-25 NOTE — TELEPHONE ENCOUNTER
[facility]/Population Health Chart Review & Patient Outreach Details For Medication Adherence Project    Reason for Outreach Encounter: 3rd Party payor non-compliance report ([employer], [employer], C, etc)  2.  Patient Outreach Method: Reviewed Patient Chart  3.   Medication in question: losartan   LAST FILLED: 1/30/25 for 90 day supply  Hypertension Medications              amLODIPine (NORVASC) 10 MG tablet Take 1 tablet (10 mg total) by mouth once daily.    losartan (COZAAR) 100 MG tablet TAKE 1 TABLET BY MOUTH EVERY DAY              4.  Reviewed and or Updates Made To: Patient Chart  5. Outreach Outcomes and/or actions taken: Patient filled medication and is on track to be adherent

## 2025-02-26 ENCOUNTER — PATIENT MESSAGE (OUTPATIENT)
Dept: REHABILITATION | Facility: HOSPITAL | Age: 45
End: 2025-02-26

## 2025-02-26 ENCOUNTER — CLINICAL SUPPORT (OUTPATIENT)
Dept: REHABILITATION | Facility: HOSPITAL | Age: 45
End: 2025-02-26
Payer: COMMERCIAL

## 2025-02-26 DIAGNOSIS — M25.612 DECREASED RANGE OF MOTION OF LEFT SHOULDER: Primary | ICD-10-CM

## 2025-02-26 PROCEDURE — 97110 THERAPEUTIC EXERCISES: CPT | Mod: CQ

## 2025-02-26 PROCEDURE — 97112 NEUROMUSCULAR REEDUCATION: CPT | Mod: CQ

## 2025-02-26 PROCEDURE — 97140 MANUAL THERAPY 1/> REGIONS: CPT | Mod: CQ

## 2025-02-26 NOTE — PROGRESS NOTES
Good morning, Mr. Dhillon would like to possibly be referred to pain management at this point to possibly try JON secondary to the relief he received with dry needling.  Thanks! [facility]   Physical Therapy Treatment Note     Name: Felix Issa [facility]  Clinic Number: [id number]    Therapy Diagnosis:   No diagnosis found.      Physician: Marcin Viera III, *    Visit Date: 2/26/2025  Physician Orders: PT Eval and Treat  Medical Diagnosis from Referral: Traumatic rotator cuff tear, left, initial encounter [S46.012A]   Evaluation Date: 1/16/2025  Authorization Period Expiration: 12/31/2025  Plan of Care Expiration: 04/10/2025  Progress Note Due: 02/13/2025  Visit # / Visits authorized: 6/ 20  FOTO Intake: #1 given on eval   FOTO Follow Up:   FOTO Follow UP:      Time In: 0900 am   Time Out: 1000 am  Total Billable Time: 60 minutes   PT tech extender used  DOS: 12/19/2024  S/p: left  1. Shoulder arthroscopic rotator cuff repair 50 x 25 mm, large, double row (CPT 49901)  (complex -22 modifier)  2. Shoulder arthroscopic biceps tenodesis (CPT 69418)  3. Shoulder arthroscopic subacromial decompression, bursectomy   4. Shoulder arthroscopic extensive debridement (anterior, posterior glenohumeral joint, subacromial space) (CPT 72862)  5. Shoulder arthroscopic labral debridement (CPT 35948)  6. Shoulder arthroscopic microfracture (Crimson duvet technique) (CPT 07545)  7. Shoulder arthroscopic lysis of adhesions (CPT 49447)     Post-Op Precautions: We will follow the arthroscopic rotator cuff repair guidelines for a large size rotator cuff tear.  We discussed with the patient's family after surgery.  The patient will remain in a sling for 6 weeks.  PT to start at 4 weeks.         Precautions: Standard and post-op      SUBJECTIVE     Pt reports: shoulder has been hurting since last session near deltoid insertion. Missed last appt 2* pain and was going to be late after dropping off kid at school.     He was compliant with home exercise program.  Response to previous treatment: none  Functional change: on going    Pain: 2/10  Location: left shoulder      OBJECTIVE  "    Objective Measures updated at progress report unless specified.     9 weeks and 6 day s/p as of 2/26    Shoulder Passive Range of Motion within Protcol Limits:     Right Left   Flexion    185 115   ER at 0    60 55   ER at 90    90 65   IR    35  NT     L AROM  FE: 90 deg prior to shrug    Treatment       Felix received the treatments listed below:      Therapeutic exercises to develop strength, endurance, ROM, flexibility, posture, and core stabilization for 10 minutes including:  Lat walkbacks; 15" x 10-NP  Wand ER at 20 deg scap and 90 30x  Wand chest press to OH 3 x 10    Manual therapy techniques: Joint mobilizations were applied to the: L shoulder for 10 minutes, including:  Glenohumeral post glide Gr 3  Glenohumeral inf glide Gr 3  PROM within precautions    Neuromuscular re-education activities to improve: Balance, Coordination, Kinesthetic, Sense, Proprioception, and Posture for 40 minutes. The following activities were included:  ER/IR Walkouts YTB/OTB 3 x 10   SL SA press/LT activation  with dowel 3x10  Manual rhythmic stab IR/ER at 20 and 90  Manual rhythmic stab FL/ext    NP Today:  SA wall slide 3 x 10   SL ER to neutral 0#; 3 x 10 x 5"  Landmine 3 x 15  SL flexion with dowel 3 x 10   Prone shoulder ext 3x10 5" hold  Prone row 3x10 5" hold  A walkouts; OTB: 3 x 10       Therapeutic activities to improve functional performance for 00 minutes, including:          Patient Education and Home Exercises     Home Exercises Provided and Patient Education Provided     Education provided:   - Cont HEP    Written Home Exercises Provided: Patient instructed to cont prior HEP. Exercises were reviewed and Felix was able to demonstrate them prior to the end of the session.  Felix demonstrated good  understanding of the education provided. See EMR under Patient Instructions for exercises provided during therapy sessions    ASSESSMENT     Felix presents today with 5/10 shoulder symptoms mainly with eccentric " control of lowering his arm.  Pt points to insertion spot of deltoid. Modified today's session to work on humeral head centering, scapular NM control and proper cuff activation. Able to flex UE to 90 but with noted superior humeral head migration. Noted pain with AROM abduction with not being able to lift to 90 degrees. After his session he was able to abduct his shoulder to 90. Still reported pain at 5/10. Will work on walkouts at home to improve cuff strength as this is most likely what is causing his shoulder symptoms.     Felix Is progressing well towards his goals.     Pt prognosis is Good.     Pt will continue to benefit from skilled outpatient physical therapy to address the deficits listed in the problem list box on initial evaluation, provide pt/family education and to maximize pt's level of independence in the home and community environment.     Pt's spiritual, cultural and educational needs considered and pt agreeable to plan of care and goals.     Anticipated barriers to physical therapy: none    Goals:   Short Term Goals: 6 weeks  1. Pt will be compliant with HEP 50% of prescribed amount. MET  2. The pt to demo improvement in L shoulder PROM to by 80% of the R MET  3.  The pt to demo tolerance to being out of the sling for 24 hours with pain <2/10 MET     Long Term Goals: 24 weeks   Pt will be compliant with % of prescribed amount. (Progressing, not met)  Pt will score 35% or better on FOTO Shoulder Mobility(Progressing, not met)  The pt to improvement in AROM of L shoulder within 80% of R shoulder to improve tolerance to OH movement (Progressing, not met)  The pt to  Demo at least 4+/5 of RTC muscle testing to demo improvement in tolerance to activity(Progressing, not met)  The pt to tolerate lifting 10# overhead to improve tolerance to ADLs and work related activities (Progressing, not met)  The pt will report full participation in ADLs and IADLs without restrictions related to L Shoulder.   (Progressing, not met)    PLAN     Plan of care Certification: 1/16/2025 to 04/10/2025.     Outpatient Physical Therapy 2 times weekly for 12 weeks to include the following interventions: Manual Therapy, Moist Heat/ Ice, Neuromuscular Re-ed, Patient Education, Therapeutic Activites, and Therapeutic Exercise.     Merly Dahl, PTA

## 2025-03-07 ENCOUNTER — PATIENT MESSAGE (OUTPATIENT)
Dept: REHABILITATION | Facility: HOSPITAL | Age: 45
End: 2025-03-07
Payer: COMMERCIAL

## 2025-03-12 ENCOUNTER — CLINICAL SUPPORT (OUTPATIENT)
Dept: REHABILITATION | Facility: HOSPITAL | Age: 45
End: 2025-03-12
Payer: COMMERCIAL

## 2025-03-12 DIAGNOSIS — M25.612 DECREASED RANGE OF MOTION OF LEFT SHOULDER: Primary | ICD-10-CM

## 2025-03-12 DIAGNOSIS — M25.612 DECREASED RANGE OF MOTION OF SHOULDER, LEFT: ICD-10-CM

## 2025-03-12 DIAGNOSIS — R29.898 DECREASED STRENGTH OF UPPER EXTREMITY: ICD-10-CM

## 2025-03-12 PROCEDURE — 97112 NEUROMUSCULAR REEDUCATION: CPT | Mod: CQ

## 2025-03-12 PROCEDURE — 97110 THERAPEUTIC EXERCISES: CPT | Mod: CQ

## 2025-03-12 PROCEDURE — 97140 MANUAL THERAPY 1/> REGIONS: CPT | Mod: CQ

## 2025-03-12 NOTE — PROGRESS NOTES
[facility]   Physical Therapy Treatment Note     Name: Felix Issa [facility] [place]  Clinic Number: [id number]    Therapy Diagnosis:   Encounter Diagnoses   Name Primary?    Decreased range of motion of left shoulder Yes    Decreased strength of upper extremity     Decreased range of motion of shoulder, left          Physician: Marcin Viera III, *    Visit Date: 3/12/2025  Physician Orders: PT Eval and Treat  Medical Diagnosis from Referral: Traumatic rotator cuff tear, left, initial encounter [S46.012A]   Evaluation Date: 1/16/2025  Authorization Period Expiration: 12/31/2025  Plan of Care Expiration: 04/10/2025  Progress Note Due: 02/13/2025  Visit # / Visits authorized: 8/ 20  FOTO Intake: #1 given on eval   FOTO Follow Up:   FOTO Follow UP:      Time In: 0910 am   Time Out: 1000 am  Total Billable Time: 50 minutes   PT tech extender used  DOS: 12/19/2024  S/p: left  1. Shoulder arthroscopic rotator cuff repair 50 x 25 mm, large, double row (CPT 90088)  (complex -22 modifier)  2. Shoulder arthroscopic biceps tenodesis (CPT 76069)  3. Shoulder arthroscopic subacromial decompression, bursectomy   4. Shoulder arthroscopic extensive debridement (anterior, posterior glenohumeral joint, subacromial space) (CPT 76733)  5. Shoulder arthroscopic labral debridement (CPT 27529)  6. Shoulder arthroscopic microfracture (Crimson duvet technique) (CPT 70173)  7. Shoulder arthroscopic lysis of adhesions (CPT 41711)     Post-Op Precautions: We will follow the arthroscopic rotator cuff repair guidelines for a large size rotator cuff tear.  We discussed with the patient's family after surgery.  The patient will remain in a sling for 6 weeks.  PT to start at 4 weeks.         Precautions: Standard and post-op      SUBJECTIVE     Pt reports: shoulder is doing better    He was compliant with home exercise program.  Response to previous treatment: none  Functional change: on going    Pain: 2/10  Location: left  "shoulder      OBJECTIVE     Objective Measures updated at progress report unless specified.     11 weeks and 6 day s/p as of 3/12    Shoulder AAROM Range of Motion within Protcol Limits:     Right Left   Flexion    185 160   ER at 0    60 70   ER at 90    90 80   IR    35  55     L AROM  FE: 90 deg prior to shrug    Treatment       Felix received the treatments listed below:      Therapeutic exercises to develop strength, endurance, ROM, flexibility, posture, and core stabilization for 15 minutes including:    #2 Wand ER at 20 deg scap and 90 30x  #2 Wand chest press to OH 3 x 10    Manual therapy techniques: Joint mobilizations were applied to the: L shoulder for 15 minutes, including:  Glenohumeral post glide Gr 3  Glenohumeral inf glide Gr 3  PROM within precautions  ROM measurements     Neuromuscular re-education activities to improve: Balance, Coordination, Kinesthetic, Sense, Proprioception, and Posture for 20  minutes. The following activities were included:  ER/IR Walkouts OTB/GTB 3 x 12-15  SL ER 3x12 #0  Landmine with SA press 3x15         NP Today:  SA wall slide 3 x 10   SL ER to neutral 0#; 3 x 10 x 5"  Landmine 3 x 15  SL flexion with dowel 3 x 10   Prone shoulder ext 3x10 5" hold  Prone row 3x10 5" hold  A walkouts; OTB: 3 x 10       Therapeutic activities to improve functional performance for 00 minutes, including:          Patient Education and Home Exercises     Home Exercises Provided and Patient Education Provided     Education provided:   - Cont HEP    Written Home Exercises Provided: Patient instructed to cont prior HEP. Exercises were reviewed and Felix was able to demonstrate them prior to the end of the session.  Felix demonstrated good  understanding of the education provided. See EMR under Patient Instructions for exercises provided during therapy sessions    ASSESSMENT     Felix is doing well 1 day shy of 12 weeks. Lateral shoulder symptoms are improving. Nearing normal limits with ROM in " all direction with most limited with IR. Progressed pt as above. Pt does not an appt with MD for 12 week follow up scheduled. Told pt to reach out to MD's team to get scheduled for a follow up.     Felix Is progressing well towards his goals.     Pt prognosis is Good.     Pt will continue to benefit from skilled outpatient physical therapy to address the deficits listed in the problem list box on initial evaluation, provide pt/family education and to maximize pt's level of independence in the home and community environment.     Pt's spiritual, cultural and educational needs considered and pt agreeable to plan of care and goals.     Anticipated barriers to physical therapy: none    Goals:   Short Term Goals: 6 weeks  1. Pt will be compliant with HEP 50% of prescribed amount. MET  2. The pt to demo improvement in L shoulder PROM to by 80% of the R MET  3.  The pt to demo tolerance to being out of the sling for 24 hours with pain <2/10 MET     Long Term Goals: 24 weeks   Pt will be compliant with % of prescribed amount. (Progressing, not met)  Pt will score 35% or better on FOTO Shoulder Mobility(Progressing, not met)  The pt to improvement in AROM of L shoulder within 80% of R shoulder to improve tolerance to OH movement (Progressing, not met)  The pt to  Demo at least 4+/5 of RTC muscle testing to demo improvement in tolerance to activity(Progressing, not met)  The pt to tolerate lifting 10# overhead to improve tolerance to ADLs and work related activities (Progressing, not met)  The pt will report full participation in ADLs and IADLs without restrictions related to L Shoulder.  (Progressing, not met)    PLAN     Plan of care Certification: 1/16/2025 to 04/10/2025.     Outpatient Physical Therapy 2 times weekly for 12 weeks to include the following interventions: Manual Therapy, Moist Heat/ Ice, Neuromuscular Re-ed, Patient Education, Therapeutic Activites, and Therapeutic Exercise.     Merly Dahl,  PTA

## 2025-03-13 ENCOUNTER — PATIENT MESSAGE (OUTPATIENT)
Dept: SPORTS MEDICINE | Facility: CLINIC | Age: 45
End: 2025-03-13
Payer: COMMERCIAL

## 2025-03-14 ENCOUNTER — CLINICAL SUPPORT (OUTPATIENT)
Dept: REHABILITATION | Facility: HOSPITAL | Age: 45
End: 2025-03-14
Payer: COMMERCIAL

## 2025-03-14 DIAGNOSIS — M25.612 DECREASED RANGE OF MOTION OF LEFT SHOULDER: Primary | ICD-10-CM

## 2025-03-14 DIAGNOSIS — R29.898 DECREASED STRENGTH OF UPPER EXTREMITY: ICD-10-CM

## 2025-03-14 DIAGNOSIS — M25.612 DECREASED RANGE OF MOTION OF SHOULDER, LEFT: ICD-10-CM

## 2025-03-14 PROCEDURE — 97110 THERAPEUTIC EXERCISES: CPT

## 2025-03-14 PROCEDURE — 97112 NEUROMUSCULAR REEDUCATION: CPT

## 2025-03-14 NOTE — PROGRESS NOTES
[facility]   Physical Therapy Treatment Note     Name: Felix Issa [facility]  Clinic Number: [id number]    Therapy Diagnosis:   Encounter Diagnoses   Name Primary?    Decreased range of motion of left shoulder Yes    Decreased strength of upper extremity     Decreased range of motion of shoulder, left          Physician: Marcin Viera III, *    Visit Date: 3/14/2025  Physician Orders: PT Eval and Treat  Medical Diagnosis from Referral: Traumatic rotator cuff tear, left, initial encounter [S46.012A]   Evaluation Date: 1/16/2025  Authorization Period Expiration: 12/31/2025  Plan of Care Expiration: 04/10/2025  Progress Note Due: 02/13/2025  Visit # / Visits authorized: 9/ 20  FOTO Intake: #1 given on eval   FOTO Follow Up:   FOTO Follow UP:      Time In: 0800 am   Time Out: 1000 am  Total Billable Time: 60 minutes     PT tech extender used  DOS: 12/19/2024  S/p: left  1. Shoulder arthroscopic rotator cuff repair 50 x 25 mm, large, double row (CPT 35411)  (complex -22 modifier)  2. Shoulder arthroscopic biceps tenodesis (CPT 27595)  3. Shoulder arthroscopic subacromial decompression, bursectomy   4. Shoulder arthroscopic extensive debridement (anterior, posterior glenohumeral joint, subacromial space) (CPT 85034)  5. Shoulder arthroscopic labral debridement (CPT 17329)  6. Shoulder arthroscopic microfracture (Crimson duvet technique) (CPT 01535)  7. Shoulder arthroscopic lysis of adhesions (CPT 42524)     Post-Op Precautions: We will follow the arthroscopic rotator cuff repair guidelines for a large size rotator cuff tear.  We discussed with the patient's family after surgery.  The patient will remain in a sling for 6 weeks.  PT to start at 4 weeks.         Precautions: Standard and post-op      SUBJECTIVE     Pt reports: feeling good, no issues with shoulder today    He was compliant with home exercise program.  Response to previous treatment: none  Functional change: on going    Pain:  "2/10  Location: left shoulder      OBJECTIVE     Objective Measures updated at progress report unless specified.     12 weeks and 1 day s/p as of 3/14    Shoulder AAROM Range of Motion within Protcol Limits:     Right Left   Flexion    185 160   ER at 0    60 70   ER at 90    90 80   IR    35  55     L AROM  FE: 90 deg prior to shrug    Treatment       Felix received the treatments listed below:      Therapeutic exercises to develop strength, endurance, ROM, flexibility, posture, and core stabilization for 30 minutes including:  UBE Lv 4.0 for 4 min fwd/bwk for CV endurance and tissue extensibility  Table walkback 10x 10"  Lat walkback 20 x 5"   #2 Wand ER at 20 deg scap and 90 30x  #2 Wand chest press to OH 3 x 10  SL abd 1# 3 x 10    Manual therapy techniques: Joint mobilizations were applied to the: L shoulder for 05 minutes, including:  Glenohumeral post glide Gr 3  Glenohumeral inf glide Gr 3  PROM within precautions  ROM measurements     Neuromuscular re-education activities to improve: Balance, Coordination, Kinesthetic, Sense, Proprioception, and Posture for 25  minutes. The following activities were included:  ER/IR Walkouts GTB 3 x 12-15  SL ER #1 3 x 10 x 5"  Landmine with SA press 3x15         NP Today:  SA wall slide 3 x 10   SL flexion with dowel 3 x 10   Prone shoulder ext 3x10 5" hold  Prone row 3x10 5" hold  A walkouts; OTB: 3 x 10       Therapeutic activities to improve functional performance for 00 minutes, including:          Patient Education and Home Exercises     Home Exercises Provided and Patient Education Provided     Education provided:   - Cont HEP    Written Home Exercises Provided: Patient instructed to cont prior HEP. Exercises were reviewed and Felix was able to demonstrate them prior to the end of the session.  Felix demonstrated good  understanding of the education provided. See EMR under Patient Instructions for exercises provided during therapy sessions    ASSESSMENT     Felix is " progressing well and tolerated added load in sidelying external rotation well. He will benefit slow progression in strengthening as able per tissue healing timelines.     Felix Is progressing well towards his goals.     Pt prognosis is Good.     Pt will continue to benefit from skilled outpatient physical therapy to address the deficits listed in the problem list box on initial evaluation, provide pt/family education and to maximize pt's level of independence in the home and community environment.     Pt's spiritual, cultural and educational needs considered and pt agreeable to plan of care and goals.     Anticipated barriers to physical therapy: none    Goals:   Short Term Goals: 6 weeks  1. Pt will be compliant with HEP 50% of prescribed amount. MET  2. The pt to demo improvement in L shoulder PROM to by 80% of the R MET  3.  The pt to demo tolerance to being out of the sling for 24 hours with pain <2/10 MET     Long Term Goals: 24 weeks   Pt will be compliant with % of prescribed amount. (Progressing, not met)  Pt will score 35% or better on FOTO Shoulder Mobility(Progressing, not met)  The pt to improvement in AROM of L shoulder within 80% of R shoulder to improve tolerance to OH movement (Progressing, not met)  The pt to  Demo at least 4+/5 of C muscle testing to demo improvement in tolerance to activity(Progressing, not met)  The pt to tolerate lifting 10# overhead to improve tolerance to ADLs and work related activities (Progressing, not met)  The pt will report full participation in ADLs and IADLs without restrictions related to L Shoulder.  (Progressing, not met)    PLAN     Plan of care Certification: 1/16/2025 to 04/10/2025.     Outpatient Physical Therapy 2 times weekly for 12 weeks to include the following interventions: Manual Therapy, Moist Heat/ Ice, Neuromuscular Re-ed, Patient Education, Therapeutic Activites, and Therapeutic Exercise.     Abigail Kulkarni, PT, DPT, SCS

## 2025-03-17 ENCOUNTER — CLINICAL SUPPORT (OUTPATIENT)
Dept: REHABILITATION | Facility: HOSPITAL | Age: 45
End: 2025-03-17
Payer: COMMERCIAL

## 2025-03-17 DIAGNOSIS — R29.898 DECREASED STRENGTH OF UPPER EXTREMITY: ICD-10-CM

## 2025-03-17 DIAGNOSIS — M25.612 DECREASED RANGE OF MOTION OF LEFT SHOULDER: Primary | ICD-10-CM

## 2025-03-17 DIAGNOSIS — M25.612 DECREASED RANGE OF MOTION OF SHOULDER, LEFT: ICD-10-CM

## 2025-03-17 PROCEDURE — 97530 THERAPEUTIC ACTIVITIES: CPT

## 2025-03-17 PROCEDURE — 97112 NEUROMUSCULAR REEDUCATION: CPT

## 2025-03-17 PROCEDURE — 97140 MANUAL THERAPY 1/> REGIONS: CPT

## 2025-03-17 NOTE — PROGRESS NOTES
[facility]   Physical Therapy Treatment Note     Name: Felix Issa [facility]  Clinic Number: [id number]    Therapy Diagnosis:   No diagnosis found.        Physician: Marcin Viera III, *    Visit Date: 3/17/2025  Physician Orders: PT Eval and Treat  Medical Diagnosis from Referral: Traumatic rotator cuff tear, left, initial encounter [S46.012A]   Evaluation Date: 1/16/2025  Authorization Period Expiration: 12/31/2025  Plan of Care Expiration: 04/10/2025  Progress Note Due: 02/13/2025  Visit # / Visits authorized: 9/ 20  FOTO Intake: #1 given on eval   FOTO Follow Up:   FOTO Follow UP:      Time In: 0830  Time Out: 0920  Total Billable Time: 50 minutes     PT tech extender used  DOS: 12/19/2024  S/p: left  1. Shoulder arthroscopic rotator cuff repair 50 x 25 mm, large, double row (CPT 75881)  (complex -22 modifier)  2. Shoulder arthroscopic biceps tenodesis (CPT 30371)  3. Shoulder arthroscopic subacromial decompression, bursectomy   4. Shoulder arthroscopic extensive debridement (anterior, posterior glenohumeral joint, subacromial space) (CPT 99809)  5. Shoulder arthroscopic labral debridement (CPT 56237)  6. Shoulder arthroscopic microfracture (Crimson duvet technique) (CPT 40702)  7. Shoulder arthroscopic lysis of adhesions (CPT 96368)     Post-Op Precautions: We will follow the arthroscopic rotator cuff repair guidelines for a large size rotator cuff tear.  We discussed with the patient's family after surgery.  The patient will remain in a sling for 6 weeks.  PT to start at 4 weeks.         Precautions: Standard and post-op      SUBJECTIVE     Pt reports: sometimes he gets pain, feels weakness in shld but overall doing good.     He was compliant with home exercise program.  Response to previous treatment: none  Functional change: on going    Pain: 0/10  Location: left shoulder      OBJECTIVE     Objective Measures updated at progress report unless specified.     12 weeks and 1 day  s/p as of 3/14    Shoulder AROM Range of Motion within Protcol Limits:     Right Left   Flexion    185 130 * sig shrug   ER at 0    60 50   ER at 90    90 70   IR    50 55     L AROM  FE: 90 deg prior to shrug    Treatment       Felix received the treatments listed below:      Therapeutic exercises to develop strength, endurance, ROM, flexibility, posture, and core stabilization for 00 minutes including:    Manual therapy techniques: Joint mobilizations were applied to the: L shoulder for 10 minutes, including:  GHJ mobs post/inf grade II-III   Scap mobs sidelying   Scap MET sidelying upward rotation     Neuromuscular re-education activities to improve: Balance, Coordination, Kinesthetic, Sense, Proprioception, and Posture for 34  minutes. The following activities were included:  SL ER no weight 4x10   Wall slide to V 30x   Landmine 0# 20x 2# 3x15     Therapeutic activities to improve functional performance for 08 minutes, including:  UBE 4m fwd/4m backward 60% use of L UE       Patient Education and Home Exercises     Home Exercises Provided and Patient Education Provided     Education provided:   - Cont HEP    Written Home Exercises Provided: Patient instructed to cont prior HEP. Exercises were reviewed and Felix was able to demonstrate them prior to the end of the session.  Felix demonstrated good  understanding of the education provided. See EMR under Patient Instructions for exercises provided during therapy sessions    ASSESSMENT     Felix demonstrated inc scap upwrd rotation due to sig GHJ restrictions. Exercises focused on mobility of post cuff and scap vs GHJ movement to improve outcomes. The pt fatigued easily and benefited from cueing.     Felix Is progressing well towards his goals.     Pt prognosis is Good.     Pt will continue to benefit from skilled outpatient physical therapy to address the deficits listed in the problem list box on initial evaluation, provide pt/family education and to maximize pt's  level of independence in the home and community environment.     Pt's spiritual, cultural and educational needs considered and pt agreeable to plan of care and goals.     Anticipated barriers to physical therapy: none    Goals:   Short Term Goals: 6 weeks  1. Pt will be compliant with HEP 50% of prescribed amount. MET  2. The pt to demo improvement in L shoulder PROM to by 80% of the R MET  3.  The pt to demo tolerance to being out of the sling for 24 hours with pain <2/10 MET     Long Term Goals: 24 weeks   Pt will be compliant with % of prescribed amount. (Progressing, not met)  Pt will score 35% or better on FOTO Shoulder Mobility(Progressing, not met)  The pt to improvement in AROM of L shoulder within 80% of R shoulder to improve tolerance to OH movement (Progressing, not met)  The pt to  Demo at least 4+/5 of RTC muscle testing to demo improvement in tolerance to activity(Progressing, not met)  The pt to tolerate lifting 10# overhead to improve tolerance to ADLs and work related activities (Progressing, not met)  The pt will report full participation in ADLs and IADLs without restrictions related to L Shoulder.  (Progressing, not met)    PLAN     Focus on GHJ mobility.     Mariama Dennis, PT, DPT

## 2025-03-20 ENCOUNTER — CLINICAL SUPPORT (OUTPATIENT)
Dept: REHABILITATION | Facility: HOSPITAL | Age: 45
End: 2025-03-20
Payer: COMMERCIAL

## 2025-03-20 DIAGNOSIS — M25.612 DECREASED RANGE OF MOTION OF SHOULDER, LEFT: ICD-10-CM

## 2025-03-20 DIAGNOSIS — R29.898 DECREASED STRENGTH OF UPPER EXTREMITY: ICD-10-CM

## 2025-03-20 DIAGNOSIS — M25.612 DECREASED RANGE OF MOTION OF LEFT SHOULDER: Primary | ICD-10-CM

## 2025-03-20 PROCEDURE — 97112 NEUROMUSCULAR REEDUCATION: CPT | Mod: CQ

## 2025-03-20 PROCEDURE — 97140 MANUAL THERAPY 1/> REGIONS: CPT | Mod: CQ

## 2025-03-20 PROCEDURE — 97530 THERAPEUTIC ACTIVITIES: CPT | Mod: CQ

## 2025-03-20 NOTE — PROGRESS NOTES
[facility]   Physical Therapy Treatment Note     Name: Felix Issa [facility]  Clinic Number: [id number]    Therapy Diagnosis:   Encounter Diagnoses   Name Primary?    Decreased range of motion of left shoulder Yes    Decreased strength of upper extremity     Decreased range of motion of shoulder, left            Physician: Marcin Viera III, *    Visit Date: 3/20/2025  Physician Orders: PT Eval and Treat  Medical Diagnosis from Referral: Traumatic rotator cuff tear, left, initial encounter [S46.012A]   Evaluation Date: 1/16/2025  Authorization Period Expiration: 12/31/2025  Plan of Care Expiration: 04/10/2025  Progress Note Due: 02/13/2025  Visit # / Visits authorized: 9/ 20  FOTO Intake: #1 given on eval   FOTO Follow Up:   FOTO Follow UP:      Time In: 0825 (pt late)  Time Out: 0900  Total Billable Time: 45 minutes     PT tech extender used  DOS: 12/19/2024  S/p: left  1. Shoulder arthroscopic rotator cuff repair 50 x 25 mm, large, double row (CPT 96024)  (complex -22 modifier)  2. Shoulder arthroscopic biceps tenodesis (CPT 53773)  3. Shoulder arthroscopic subacromial decompression, bursectomy   4. Shoulder arthroscopic extensive debridement (anterior, posterior glenohumeral joint, subacromial space) (CPT 21585)  5. Shoulder arthroscopic labral debridement (CPT 26709)  6. Shoulder arthroscopic microfracture (Crimson duvet technique) (CPT 99117)  7. Shoulder arthroscopic lysis of adhesions (CPT 55975)     Post-Op Precautions: We will follow the arthroscopic rotator cuff repair guidelines for a large size rotator cuff tear.  We discussed with the patient's family after surgery.  The patient will remain in a sling for 6 weeks.  PT to start at 4 weeks.         Precautions: Standard and post-op      SUBJECTIVE     Pt reports: sometimes he gets pain, feels weakness in shld but overall doing good.     He was compliant with home exercise program.  Response to previous treatment:  none  Functional change: on going    Pain: 0/10  Location: left shoulder      OBJECTIVE     Objective Measures updated at progress report unless specified.     12 weeks and 1 day s/p as of 3/14    Shoulder AROM Range of Motion within Protcol Limits:     Right Left   Flexion    185 130 * sig shrug   ER at 0    60 50   ER at 90    90 70   IR    50 55     L AROM  FE: 90 deg prior to shrug    Treatment       Feilx received the treatments listed below:      Therapeutic exercises to develop strength, endurance, ROM, flexibility, posture, and core stabilization for 00 minutes including:    Manual therapy techniques: Joint mobilizations were applied to the: L shoulder for 10 minutes, including:  GHJ mobs post/inf grade II-III   Scap mobs sidelying   Scap MET sidelying upward rotation     Neuromuscular re-education activities to improve: Balance, Coordination, Kinesthetic, Sense, Proprioception, and Posture for 27  minutes. The following activities were included:  SL ER no weight 4x10   Wall slide to V 4x12  Landmine 0# 20x 2# 3x15     Therapeutic activities to improve functional performance for 08 minutes, including:  UBE 4m fwd/4m backward 60% use of L UE       Patient Education and Home Exercises     Home Exercises Provided and Patient Education Provided     Education provided:   - Cont HEP    Written Home Exercises Provided: Patient instructed to cont prior HEP. Exercises were reviewed and Felix was able to demonstrate them prior to the end of the session.  Felix demonstrated good  understanding of the education provided. See EMR under Patient Instructions for exercises provided during therapy sessions    ASSESSMENT     Felix demonstrated improvements with cuff activation with shoulder flexion. Cuff weakness noted with shoulder abduction AROM. Cueing needed for better LT stabilization with V wall slide to prevent excessive scapular upward rotation. Pt states he late to his appt because of traffic. Moving patient's times  to 9:00 which will work better between ripping his kid off at school and getting to therapy.    Felix Is progressing well towards his goals.     Pt prognosis is Good.     Pt will continue to benefit from skilled outpatient physical therapy to address the deficits listed in the problem list box on initial evaluation, provide pt/family education and to maximize pt's level of independence in the home and community environment.     Pt's spiritual, cultural and educational needs considered and pt agreeable to plan of care and goals.     Anticipated barriers to physical therapy: none    Goals:   Short Term Goals: 6 weeks  1. Pt will be compliant with HEP 50% of prescribed amount. MET  2. The pt to demo improvement in L shoulder PROM to by 80% of the R MET  3.  The pt to demo tolerance to being out of the sling for 24 hours with pain <2/10 MET     Long Term Goals: 24 weeks   Pt will be compliant with % of prescribed amount. (Progressing, not met)  Pt will score 35% or better on FOTO Shoulder Mobility(Progressing, not met)  The pt to improvement in AROM of L shoulder within 80% of R shoulder to improve tolerance to OH movement (Progressing, not met)  The pt to  Demo at least 4+/5 of RTC muscle testing to demo improvement in tolerance to activity(Progressing, not met)  The pt to tolerate lifting 10# overhead to improve tolerance to ADLs and work related activities (Progressing, not met)  The pt will report full participation in ADLs and IADLs without restrictions related to L Shoulder.  (Progressing, not met)    PLAN     Focus on GHJ mobility.     Merly Dahl, PTA,

## 2025-03-27 ENCOUNTER — CLINICAL SUPPORT (OUTPATIENT)
Dept: REHABILITATION | Facility: HOSPITAL | Age: 45
End: 2025-03-27
Payer: COMMERCIAL

## 2025-03-27 DIAGNOSIS — M25.612 DECREASED RANGE OF MOTION OF SHOULDER, LEFT: ICD-10-CM

## 2025-03-27 DIAGNOSIS — M25.612 DECREASED RANGE OF MOTION OF LEFT SHOULDER: Primary | ICD-10-CM

## 2025-03-27 DIAGNOSIS — R29.898 DECREASED STRENGTH OF UPPER EXTREMITY: ICD-10-CM

## 2025-03-27 PROCEDURE — 97110 THERAPEUTIC EXERCISES: CPT

## 2025-03-27 PROCEDURE — 97140 MANUAL THERAPY 1/> REGIONS: CPT

## 2025-03-27 PROCEDURE — 97112 NEUROMUSCULAR REEDUCATION: CPT

## 2025-03-31 ENCOUNTER — CLINICAL SUPPORT (OUTPATIENT)
Dept: REHABILITATION | Facility: HOSPITAL | Age: 45
End: 2025-03-31
Payer: COMMERCIAL

## 2025-03-31 ENCOUNTER — PATIENT MESSAGE (OUTPATIENT)
Dept: REHABILITATION | Facility: HOSPITAL | Age: 45
End: 2025-03-31

## 2025-03-31 DIAGNOSIS — M25.612 DECREASED RANGE OF MOTION OF LEFT SHOULDER: Primary | ICD-10-CM

## 2025-03-31 DIAGNOSIS — R29.898 DECREASED STRENGTH OF UPPER EXTREMITY: ICD-10-CM

## 2025-03-31 DIAGNOSIS — M25.612 DECREASED RANGE OF MOTION OF SHOULDER, LEFT: ICD-10-CM

## 2025-03-31 PROCEDURE — 97140 MANUAL THERAPY 1/> REGIONS: CPT

## 2025-03-31 PROCEDURE — 97112 NEUROMUSCULAR REEDUCATION: CPT

## 2025-03-31 NOTE — PROGRESS NOTES
[facility]   Physical Therapy Treatment Note     Name: Felix Issa [facility]  Clinic Number: [id number]    Therapy Diagnosis:   Encounter Diagnoses   Name Primary?    Decreased range of motion of left shoulder Yes    Decreased strength of upper extremity     Decreased range of motion of shoulder, left          Physician: Marcin Viera III, *    Visit Date: 3/27/2025  Physician Orders: PT Eval and Treat  Medical Diagnosis from Referral: Traumatic rotator cuff tear, left, initial encounter [S46.012A]   Evaluation Date: 1/16/2025  Authorization Period Expiration: 12/31/2025  Plan of Care Expiration: 04/10/2025  Progress Note Due: 02/13/2025  Visit # / Visits authorized: 9/ 20  FOTO Intake: #1 given on eval   FOTO Follow Up:   FOTO Follow UP:      Time In: 0910 (pt late)  Time Out: 1000  Total Billable Time: 45 minutes     PT tech extender used  DOS: 12/19/2024  S/p: left  1. Shoulder arthroscopic rotator cuff repair 50 x 25 mm, large, double row (CPT 07645)  (complex -22 modifier)  2. Shoulder arthroscopic biceps tenodesis (CPT 63370)  3. Shoulder arthroscopic subacromial decompression, bursectomy   4. Shoulder arthroscopic extensive debridement (anterior, posterior glenohumeral joint, subacromial space) (CPT 91311)  5. Shoulder arthroscopic labral debridement (CPT 71724)  6. Shoulder arthroscopic microfracture (Crimson duvet technique) (CPT 33761)  7. Shoulder arthroscopic lysis of adhesions (CPT 10552)     Post-Op Precautions: We will follow the arthroscopic rotator cuff repair guidelines for a large size rotator cuff tear.  We discussed with the patient's family after surgery.  The patient will remain in a sling for 6 weeks.  PT to start at 4 weeks.         Precautions: Standard and post-op      SUBJECTIVE     Pt reports: he is feeling tight today in his shoulder .     He was compliant with home exercise program.  Response to previous treatment: none  Functional change: on  going    Pain: 0/10  Location: left shoulder      OBJECTIVE     Objective Measures updated at progress report unless specified.     12 weeks and 1 day s/p as of 3/14    Shoulder AROM Range of Motion within Protcol Limits:     Right Left   Flexion    185 130 * sig shrug  Post 140   ER at 0    60 50   ER at 90    90 70   IR    50 55     L AROM  FE: 90 deg prior to shrug    Treatment       Felix received the treatments listed below:        Manual therapy techniques: Joint mobilizations were applied to the: L shoulder for 10 minutes, including:  GHJ mobs post/inf grade II-III   Scap mobs sidelying   Scap MET sidelying upward rotation     Neuromuscular re-education activities to improve: Balance, Coordination, Kinesthetic, Sense, Proprioception, and Posture for 12  minutes. The following activities were included:  SL ER no weight 4x10   Wall slide to V 4x12    Therapeutic exercises to develop ROM for 15 minutes including:  Lat stretch 30x   Landmine 0# 20x 2# 3x15   Thoracic rotations 15x B     Therapeutic activities to improve functional performance for 08 minutes, including:  UBE 4m fwd/4m backward 60% use of L UE       Patient Education and Home Exercises     Home Exercises Provided and Patient Education Provided     Education provided:   - Cont HEP    Written Home Exercises Provided: Patient instructed to cont prior HEP. Exercises were reviewed and Felix was able to demonstrate them prior to the end of the session.  Felix demonstrated good  understanding of the education provided. See EMR under Patient Instructions for exercises provided during therapy sessions    ASSESSMENT     The patient with sig restrictions in GHJ and increased scap upward rotation movement. The pt tolerated therex change well, focusing on GHJ motion > scap upward rotation. The pt advised on HEP.     Felix Is progressing well towards his goals.     Pt prognosis is Good.     Pt will continue to benefit from skilled outpatient physical therapy to  address the deficits listed in the problem list box on initial evaluation, provide pt/family education and to maximize pt's level of independence in the home and community environment.     Pt's spiritual, cultural and educational needs considered and pt agreeable to plan of care and goals.     Anticipated barriers to physical therapy: none    Goals:   Short Term Goals: 6 weeks  1. Pt will be compliant with HEP 50% of prescribed amount. MET  2. The pt to demo improvement in L shoulder PROM to by 80% of the R MET  3.  The pt to demo tolerance to being out of the sling for 24 hours with pain <2/10 MET     Long Term Goals: 24 weeks   Pt will be compliant with % of prescribed amount. (Progressing, not met)  Pt will score 35% or better on FOTO Shoulder Mobility(Progressing, not met)  The pt to improvement in AROM of L shoulder within 80% of R shoulder to improve tolerance to OH movement (Progressing, not met)  The pt to  Demo at least 4+/5 of RTC muscle testing to demo improvement in tolerance to activity(Progressing, not met)  The pt to tolerate lifting 10# overhead to improve tolerance to ADLs and work related activities (Progressing, not met)  The pt will report full participation in ADLs and IADLs without restrictions related to L Shoulder.  (Progressing, not met)    PLAN     Focus on GHJ mobility.     Mariama Dennis, PT,

## 2025-04-01 NOTE — PROGRESS NOTES
Outpatient Rehab    Physical Therapy Visit    Patient Name: Felix Stern  MRN: [id number]  YOB: 1980  Encounter Date: 3/31/2025    Therapy Diagnosis:   Encounter Diagnoses   Name Primary?    Decreased range of motion of left shoulder Yes    Decreased strength of upper extremity     Decreased range of motion of shoulder, left      Physician: Marcin Viera III, *    Physician Orders: Eval and Treat  Medical Diagnosis: Traumatic rotator cuff tear, left, initial encounter    Visit # / Visits Authorized:  13 / 20  Insurance Authorization Period: 1/1/2025 to 12/31/2025  Date of Evaluation: 1/16/2025  Plan of Care Certification: 1/16/2025 to 6/10/2025       Time In: 0800   Time Out: 0900  Total Time: 60   Total Billable Time:  30    DOS: 12/19/2024  S/p: left  1. Shoulder arthroscopic rotator cuff repair 50 x 25 mm, large, double row (CPT 13505)  (complex -22 modifier)  2. Shoulder arthroscopic biceps tenodesis (CPT 07526)  3. Shoulder arthroscopic subacromial decompression, bursectomy   4. Shoulder arthroscopic extensive debridement (anterior, posterior glenohumeral joint, subacromial space) (CPT 96555)  5. Shoulder arthroscopic labral debridement (CPT 00224)  6. Shoulder arthroscopic microfracture (Crimson duvet technique) (CPT 03490)  7. Shoulder arthroscopic lysis of adhesions (CPT 01799)  Post-Op Precautions: We will follow the arthroscopic rotator cuff repair guidelines for a large size rotator cuff tear.  We discussed with the patient's family after surgery.  The patient will remain in a sling for 6 weeks.  PT to start at 4 weeks.            Subjective   Pt reports that he has been doing his exercises 1x a day. woke up stuff this AM otherwise doing OK.  Pain reported as 2/10.      Objective        Shoulder Flexion 140 pre / 150 post     Treatment:    Manual therapy techniques: Joint mobilizations and Soft tissue Mobilization including:  Grade III-IV post and inf GHJ mobs to improve  flex/abd   MET for post cuff cross body   Lat pinning with flexion     Neuromuscular re-education activities to improve: motor control, including  Sidelying ER 0# 3x15   AAROM dowel with post cuff activaiton supine 30x      Therapeutic activities to improve functional performance including:  UBE single arm 4m fwd/4m backward   Landmine 3# 4x15     Therapeutic exercises to develop strength, endurance, and ROM including:  Wall slide with GHJ focus 30x   Resisted IR 3x15   Lat stretch table 3x15          Time Entry(in minutes):  Manual Therapy Time Entry: 10  Neuromuscular Re-Education Time Entry: 10  Therapeutic Activity Time Entry: 15  Therapeutic Exercise Time Entry: 15    Assessment & Plan   Assessment: The pt with moisesl improvement in his ROM compared to last visit. The pt therex focused on improving GHJ mobility and ms activation and dec scap upward rotation compensation for overhead movement.  Evaluation/Treatment Tolerance: Patient tolerated treatment well    Patient will continue to benefit from skilled outpatient physical therapy to address the deficits listed in the problem list box on initial evaluation, provide pt/family education and to maximize pt's level of independence in the home and community environment.     Patient's spiritual, cultural, and educational needs considered and patient agreeable to plan of care and goals.           Plan: Cont with POC    :   Goals:   Short Term Goals: 6 weeks  1. Pt will be compliant with HEP 50% of prescribed amount. MET  2. The pt to demo improvement in L shoulder PROM to by 80% of the R MET  3.  The pt to demo tolerance to being out of the sling for 24 hours with pain <2/10 MET     Long Term Goals: 24 weeks   Pt will be compliant with % of prescribed amount. (Progressing, not met)  Pt will score 35% or better on FOTO Shoulder Mobility(Progressing, not met)  The pt to improvement in AROM of L shoulder within 80% of R shoulder to improve tolerance to OH  movement (Progressing, not met)  The pt to  Demo at least 4+/5 of RTC muscle testing to demo improvement in tolerance to activity(Progressing, not met)  The pt to tolerate lifting 10# overhead to improve tolerance to ADLs and work related activities (Progressing, not met)  The pt will report full participation in ADLs and IADLs without restrictions related to L Shoulder.  (Progressing, not met)       Mariama Dennis, PT

## 2025-04-02 ENCOUNTER — PATIENT MESSAGE (OUTPATIENT)
Dept: REHABILITATION | Facility: HOSPITAL | Age: 45
End: 2025-04-02
Payer: COMMERCIAL

## 2025-04-04 ENCOUNTER — CLINICAL SUPPORT (OUTPATIENT)
Dept: REHABILITATION | Facility: HOSPITAL | Age: 45
End: 2025-04-04
Payer: COMMERCIAL

## 2025-04-04 DIAGNOSIS — M25.612 DECREASED RANGE OF MOTION OF LEFT SHOULDER: Primary | ICD-10-CM

## 2025-04-04 DIAGNOSIS — R29.898 DECREASED STRENGTH OF UPPER EXTREMITY: ICD-10-CM

## 2025-04-04 DIAGNOSIS — M25.612 DECREASED RANGE OF MOTION OF SHOULDER, LEFT: ICD-10-CM

## 2025-04-04 PROCEDURE — 97112 NEUROMUSCULAR REEDUCATION: CPT | Mod: CQ

## 2025-04-04 PROCEDURE — 97530 THERAPEUTIC ACTIVITIES: CPT | Mod: CQ

## 2025-04-04 PROCEDURE — 97110 THERAPEUTIC EXERCISES: CPT | Mod: CQ

## 2025-04-04 NOTE — PROGRESS NOTES
Outpatient Rehab    Physical Therapy Visit    Patient Name: Felix Stern  MRN: [id number]  YOB: 1980  Encounter Date: 4/4/2025    Therapy Diagnosis:   Encounter Diagnoses   Name Primary?    Decreased range of motion of left shoulder Yes    Decreased strength of upper extremity     Decreased range of motion of shoulder, left        Physician: Marcin Viera III, *    Physician Orders: Eval and Treat  Medical Diagnosis: Traumatic rotator cuff tear, left, initial encounter    Visit # / Visits Authorized:  14 / 20  Insurance Authorization Period: 1/1/2025 to 12/31/2025  Date of Evaluation: 1/16/2025  Plan of Care Certification: 1/16/2025 to 6/10/2025       Time In: 0905   Time Out: 1000  Total Time: 55   Total Billable Time: 3734    DOS: 12/19/2024  S/p: left  1. Shoulder arthroscopic rotator cuff repair 50 x 25 mm, large, double row (CPT 46434)  (complex -22 modifier)  2. Shoulder arthroscopic biceps tenodesis (CPT 07618)  3. Shoulder arthroscopic subacromial decompression, bursectomy   4. Shoulder arthroscopic extensive debridement (anterior, posterior glenohumeral joint, subacromial space) (CPT 53008)  5. Shoulder arthroscopic labral debridement (CPT 75490)  6. Shoulder arthroscopic microfracture (Crimson duvet technique) (CPT 23220)  7. Shoulder arthroscopic lysis of adhesions (CPT 68606)  Post-Op Precautions: We will follow the arthroscopic rotator cuff repair guidelines for a large size rotator cuff tear.  We discussed with the patient's family after surgery.  The patient will remain in a sling for 6 weeks.  PT to start at 4 weeks.            Subjective   shoulder is getting better.         Objective        Shoulder Flexion 140 pre / 150 post     Treatment:    Manual therapy techniques: Joint mobilizations and Soft tissue Mobilization including:  Grade III-IV post and inf GHJ mobs to improve flex/abd   MET for post cuff cross body   Lat pinning with flexion     Neuromuscular  re-education activities to improve: motor control, including  Sidelying ER 0# 3x15   AAROM dowel with post cuff activaiton supine 30x      Therapeutic activities to improve functional performance including:  UBE single arm 4m fwd/4m backward   Landmine 3# 4x15     Therapeutic exercises to develop strength, endurance, and ROM including:  Wall slide with GHJ focus 30x   Resisted IR 3x15   Lat stretch table 3x15   ER @ 90 fl on table 3x10       Time Entry(in minutes):  Manual Therapy Time Entry: 10  Neuromuscular Re-Education Time Entry: 10  Therapeutic Activity Time Entry: 13  Therapeutic Exercise Time Entry: 22    Assessment & Plan   Assessment: A/PROM continues to show imrprovements. still has superior humeral head migration with shoulder flexion. cueining needed for proper cuff activation with exercises.       Patient will continue to benefit from skilled outpatient physical therapy to address the deficits listed in the problem list box on initial evaluation, provide pt/family education and to maximize pt's level of independence in the home and community environment.     Patient's spiritual, cultural, and educational needs considered and patient agreeable to plan of care and goals.           Plan: Cont with POC    :   Goals:   Short Term Goals: 6 weeks  1. Pt will be compliant with HEP 50% of prescribed amount. MET  2. The pt to demo improvement in L shoulder PROM to by 80% of the R MET  3.  The pt to demo tolerance to being out of the sling for 24 hours with pain <2/10 MET     Long Term Goals: 24 weeks   Pt will be compliant with % of prescribed amount. (Progressing, not met)  Pt will score 35% or better on FOTO Shoulder Mobility(Progressing, not met)  The pt to improvement in AROM of L shoulder within 80% of R shoulder to improve tolerance to OH movement (Progressing, not met)  The pt to  Demo at least 4+/5 of RTC muscle testing to demo improvement in tolerance to activity(Progressing, not met)  The pt to  tolerate lifting 10# overhead to improve tolerance to ADLs and work related activities (Progressing, not met)  The pt will report full participation in ADLs and IADLs without restrictions related to L Shoulder.  (Progressing, not met)       Merly Dahl, PTA

## 2025-04-07 ENCOUNTER — CLINICAL SUPPORT (OUTPATIENT)
Dept: REHABILITATION | Facility: HOSPITAL | Age: 45
End: 2025-04-07
Payer: COMMERCIAL

## 2025-04-07 DIAGNOSIS — M25.612 DECREASED RANGE OF MOTION OF SHOULDER, LEFT: ICD-10-CM

## 2025-04-07 DIAGNOSIS — R29.898 DECREASED STRENGTH OF UPPER EXTREMITY: ICD-10-CM

## 2025-04-07 DIAGNOSIS — M25.612 DECREASED RANGE OF MOTION OF LEFT SHOULDER: Primary | ICD-10-CM

## 2025-04-07 PROCEDURE — 97112 NEUROMUSCULAR REEDUCATION: CPT

## 2025-04-07 PROCEDURE — 97530 THERAPEUTIC ACTIVITIES: CPT

## 2025-04-07 PROCEDURE — 97140 MANUAL THERAPY 1/> REGIONS: CPT

## 2025-04-07 NOTE — PROGRESS NOTES
Outpatient Rehab    Physical Therapy Visit    Patient Name: Felix Stern  MRN: [id number]  YOB: 1980  Encounter Date: 4/7/2025    Therapy Diagnosis:   Encounter Diagnoses   Name Primary?    Decreased range of motion of left shoulder Yes    Decreased strength of upper extremity     Decreased range of motion of shoulder, left          Physician: Marcin Viera III, *    Physician Orders: Eval and Treat  Medical Diagnosis: Traumatic rotator cuff tear, left, initial encounter    Visit # / Visits Authorized:  15 / 20  Insurance Authorization Period: 1/1/2025 to 12/31/2025  Date of Evaluation: 1/16/2025  Plan of Care Certification: 1/16/2025 to 6/10/2025       Time In: 0900   Time Out: 1000  Total Time: 60   Total Billable Time: 3437    DOS: 12/19/2024  S/p: left  1. Shoulder arthroscopic rotator cuff repair 50 x 25 mm, large, double row (CPT 29998)  (complex -22 modifier)  2. Shoulder arthroscopic biceps tenodesis (CPT 91874)  3. Shoulder arthroscopic subacromial decompression, bursectomy   4. Shoulder arthroscopic extensive debridement (anterior, posterior glenohumeral joint, subacromial space) (CPT 93733)  5. Shoulder arthroscopic labral debridement (CPT 52045)  6. Shoulder arthroscopic microfracture (Crimson duvet technique) (CPT 48809)  7. Shoulder arthroscopic lysis of adhesions (CPT 80878)  Post-Op Precautions: We will follow the arthroscopic rotator cuff repair guidelines for a large size rotator cuff tear.  We discussed with the patient's family after surgery.  The patient will remain in a sling for 6 weeks.  PT to start at 4 weeks.            Subjective   Feels some pinching and pain but its very intermittent. Shoulder still doesnt feel as strong.  Pain reported as 2/10.      Objective        Shoulder Flexion 135 pre / 145 post R UE   Shoulder Flexon L 165  Shoulder ER 50 pre/ 65 post L UE  Shoulder ER L 75     Treatment:    Manual therapy techniques: Joint mobilizations and Soft  tissue Mobilization including:  Grade III-IV post and inf GHJ mobs to improve flex/abd   MET for post cuff cross body   Lat pinning with flexion     Neuromuscular re-education activities to improve: motor control, including  Sidelying ER 0# 3x15   AAROM dowel with post cuff activaiton supine 30x    Scaption 2# 5R with L iso hold/ 5L with R iso hold / 5 B 3 rds     Therapeutic activities to improve functional performance including:  UBE single arm 4m fwd/4m backward level 2 backward and level 5 forward   Landmine 4# 4x15     Therapeutic exercises to develop strength, endurance, and ROM including:  Resisted IR 3x15   Lat stretch table        Time Entry(in minutes):  Manual Therapy Time Entry: 12  Neuromuscular Re-Education Time Entry: 18  Therapeutic Activity Time Entry: 15  Therapeutic Exercise Time Entry: 10    Assessment & Plan   Assessment: The patient with weakness in post cuff and scaption that was addressed today- pt with good tolerance to cueing- difficulty with maintaining appropraite ms activation. Overall progressing well with in session improvement in ROM.  Evaluation/Treatment Tolerance: Patient tolerated treatment well    Patient will continue to benefit from skilled outpatient physical therapy to address the deficits listed in the problem list box on initial evaluation, provide pt/family education and to maximize pt's level of independence in the home and community environment.     Patient's spiritual, cultural, and educational needs considered and patient agreeable to plan of care and goals.           Plan: Cont with POC      Goals:   Short Term Goals: 6 weeks  1. Pt will be compliant with HEP 50% of prescribed amount. MET  2. The pt to demo improvement in L shoulder PROM to by 80% of the R MET  3.  The pt to demo tolerance to being out of the sling for 24 hours with pain <2/10 MET     Long Term Goals: 24 weeks   Pt will be compliant with % of prescribed amount. (Progressing, not met)  Pt will  score 35% or better on FOTO Shoulder Mobility(Progressing, not met)  The pt to improvement in AROM of L shoulder within 80% of R shoulder to improve tolerance to OH movement (Progressing, not met)  The pt to  Demo at least 4+/5 of RTC muscle testing to demo improvement in tolerance to activity(Progressing, not met)  The pt to tolerate lifting 10# overhead to improve tolerance to ADLs and work related activities (Progressing, not met)  The pt will report full participation in ADLs and IADLs without restrictions related to L Shoulder.  (Progressing, not met)       Mariama Dennis, PT

## 2025-04-10 ENCOUNTER — CLINICAL SUPPORT (OUTPATIENT)
Dept: REHABILITATION | Facility: HOSPITAL | Age: 45
End: 2025-04-10
Payer: COMMERCIAL

## 2025-04-10 DIAGNOSIS — M25.612 DECREASED RANGE OF MOTION OF SHOULDER, LEFT: ICD-10-CM

## 2025-04-10 DIAGNOSIS — R29.898 DECREASED STRENGTH OF UPPER EXTREMITY: ICD-10-CM

## 2025-04-10 DIAGNOSIS — M25.612 DECREASED RANGE OF MOTION OF LEFT SHOULDER: Primary | ICD-10-CM

## 2025-04-10 PROCEDURE — 97140 MANUAL THERAPY 1/> REGIONS: CPT | Mod: CQ

## 2025-04-10 PROCEDURE — 97530 THERAPEUTIC ACTIVITIES: CPT | Mod: CQ

## 2025-04-10 PROCEDURE — 97112 NEUROMUSCULAR REEDUCATION: CPT | Mod: CQ

## 2025-04-10 PROCEDURE — 97110 THERAPEUTIC EXERCISES: CPT | Mod: CQ

## 2025-04-10 NOTE — PROGRESS NOTES
Outpatient Rehab    Physical Therapy Visit    Patient Name: Felix Stern  MRN: [id number]  YOB: 1980  Encounter Date: 4/10/2025    Therapy Diagnosis:   Encounter Diagnoses   Name Primary?    Decreased range of motion of left shoulder Yes    Decreased strength of upper extremity     Decreased range of motion of shoulder, left          Physician: Marcin Viera III, *    Physician Orders: Eval and Treat  Medical Diagnosis: Traumatic rotator cuff tear, left, initial encounter    Visit # / Visits Authorized:  16 / 20  Insurance Authorization Period: 1/1/2025 to 12/31/2025  Date of Evaluation: 1/16/2025  Plan of Care Certification: 1/16/2025 to 6/10/2025       Time In: 0900   Time Out:    Total Time:     Total Billable Time:  37    DOS: 12/19/2024  S/p: left  1. Shoulder arthroscopic rotator cuff repair 50 x 25 mm, large, double row (CPT 28484)  (complex -22 modifier)  2. Shoulder arthroscopic biceps tenodesis (CPT 38163)  3. Shoulder arthroscopic subacromial decompression, bursectomy   4. Shoulder arthroscopic extensive debridement (anterior, posterior glenohumeral joint, subacromial space) (CPT 91321)  5. Shoulder arthroscopic labral debridement (CPT 26566)  6. Shoulder arthroscopic microfracture (Crimson duvet technique) (CPT 45180)  7. Shoulder arthroscopic lysis of adhesions (CPT 84516)  Post-Op Precautions: We will follow the arthroscopic rotator cuff repair guidelines for a large size rotator cuff tear.  We discussed with the patient's family after surgery.  The patient will remain in a sling for 6 weeks.  PT to start at 4 weeks.            Subjective   Getting better.         Objective        Shoulder Flexion 135 pre / 145 post R UE   Shoulder Flexon L 165  Shoulder ER 50 pre/ 65 post L UE  Shoulder ER L 75     Treatment:    Manual therapy techniques: Joint mobilizations and Soft tissue Mobilization including:  Grade III-IV post and inf GHJ mobs to improve flex/abd   MET for post cuff  "cross body   Lat pinning with flexion     Neuromuscular re-education activities to improve: motor control, including  Sidelying ER 0# 3x15   AAROM dowel with post cuff activaiton supine 30x    Scaption 2# 5R with L iso hold/ 5L with R iso hold / 5 B 3 rds   ER @ 90 flexion    Therapeutic activities to improve functional performance including:  UBE single arm 4m fwd/4m backward level 2 backward and level 6 forward   Landmine 4# 4x15     Therapeutic exercises to develop strength, endurance, and ROM including:  Resisted IR 3x15   Lat stretch table 10x10"       Time Entry(in minutes):       Assessment & Plan   Assessment:         Patient will continue to benefit from skilled outpatient physical therapy to address the deficits listed in the problem list box on initial evaluation, provide pt/family education and to maximize pt's level of independence in the home and community environment.     Patient's spiritual, cultural, and educational needs considered and patient agreeable to plan of care and goals.           Plan:        Goals:   Short Term Goals: 6 weeks  1. Pt will be compliant with HEP 50% of prescribed amount. MET  2. The pt to demo improvement in L shoulder PROM to by 80% of the R MET  3.  The pt to demo tolerance to being out of the sling for 24 hours with pain <2/10 MET     Long Term Goals: 24 weeks   Pt will be compliant with % of prescribed amount. (Progressing, not met)  Pt will score 35% or better on FOTO Shoulder Mobility(Progressing, not met)  The pt to improvement in AROM of L shoulder within 80% of R shoulder to improve tolerance to OH movement (Progressing, not met)  The pt to  Demo at least 4+/5 of RTC muscle testing to demo improvement in tolerance to activity(Progressing, not met)  The pt to tolerate lifting 10# overhead to improve tolerance to ADLs and work related activities (Progressing, not met)  The pt will report full participation in ADLs and IADLs without restrictions related to L " Shoulder.  (Progressing, not met)       Merly Dahl, PTA

## 2025-04-14 ENCOUNTER — TELEPHONE (OUTPATIENT)
Dept: SPORTS MEDICINE | Facility: CLINIC | Age: 45
End: 2025-04-14
Payer: COMMERCIAL

## 2025-04-21 ENCOUNTER — PATIENT MESSAGE (OUTPATIENT)
Dept: REHABILITATION | Facility: HOSPITAL | Age: 45
End: 2025-04-21
Payer: COMMERCIAL

## 2025-04-24 ENCOUNTER — TELEPHONE (OUTPATIENT)
Dept: SPORTS MEDICINE | Facility: CLINIC | Age: 45
End: 2025-04-24
Payer: COMMERCIAL

## 2025-04-24 ENCOUNTER — CLINICAL SUPPORT (OUTPATIENT)
Dept: REHABILITATION | Facility: HOSPITAL | Age: 45
End: 2025-04-24
Payer: COMMERCIAL

## 2025-04-24 DIAGNOSIS — M25.612 DECREASED RANGE OF MOTION OF LEFT SHOULDER: Primary | ICD-10-CM

## 2025-04-24 DIAGNOSIS — R29.898 DECREASED STRENGTH OF UPPER EXTREMITY: ICD-10-CM

## 2025-04-24 DIAGNOSIS — M25.612 DECREASED RANGE OF MOTION OF SHOULDER, LEFT: ICD-10-CM

## 2025-04-24 PROCEDURE — 97530 THERAPEUTIC ACTIVITIES: CPT

## 2025-04-24 PROCEDURE — 97112 NEUROMUSCULAR REEDUCATION: CPT

## 2025-04-24 PROCEDURE — 97110 THERAPEUTIC EXERCISES: CPT

## 2025-04-28 NOTE — PROGRESS NOTES
Outpatient Rehab    Physical Therapy Visit    Patient Name: Felix Stern  MRN: [id number]  YOB: 1980  Encounter Date: 4/24/2025    Therapy Diagnosis:   Encounter Diagnoses   Name Primary?    Decreased range of motion of left shoulder Yes    Decreased strength of upper extremity     Decreased range of motion of shoulder, left          Physician: Marcin Viera III, *    Physician Orders: Eval and Treat  Medical Diagnosis: Traumatic rotator cuff tear, left, initial encounter    Visit # / Visits Authorized:  17 / 40  Insurance Authorization Period: 1/1/2025 to 12/31/2025  Date of Evaluation: 1/16/2025  Plan of Care Certification: 1/16/2025 to 6/10/2025       Time In: 0900   Time Out: 1000  Total Time: 60  Total Billable Time: 38    DOS: 12/19/2024  S/p: left  1. Shoulder arthroscopic rotator cuff repair 50 x 25 mm, large, double row (CPT 74856)  (complex -22 modifier)  2. Shoulder arthroscopic biceps tenodesis (CPT 05679)  3. Shoulder arthroscopic subacromial decompression, bursectomy   4. Shoulder arthroscopic extensive debridement (anterior, posterior glenohumeral joint, subacromial space) (CPT 52270)  5. Shoulder arthroscopic labral debridement (CPT 28847)  6. Shoulder arthroscopic microfracture (Crimson duvet technique) (CPT 33512)  7. Shoulder arthroscopic lysis of adhesions (CPT 90190)  Post-Op Precautions: We will follow the arthroscopic rotator cuff repair guidelines for a large size rotator cuff tear.  We discussed with the patient's family after surgery.  The patient will remain in a sling for 6 weeks.  PT to start at 4 weeks.            Subjective   Pt notes that he has been gone due to difficutly with returning after a trip. He noets he wasnt able to get to all his exercises, but his shoulder doesnt feel too bad..  Pain reported as 1/10.      Objective        Shoulder Flexion 135 pre / 145 post R UE   Shoulder Flexon L 165  Shoulder ER 50 pre/ 65 post L UE  Shoulder ER L 75    Shoulder ABD 80 deg with scap elevation L    Treatment:    Manual therapy techniques: Joint mobilizations and Soft tissue Mobilization including:  Grade III-IV post and inf GHJ mobs to improve flex/abd   MET for post cuff cross body   Lat pinning with flexion     Neuromuscular re-education activities to improve: motor control, including  Sidelying ER 1# 3x15   AAROM dowel with post cuff activaiton supine 30x    Scaption 2# 5R with L iso hold/ 5L with R iso hold / 5 B 3 rds   Sidelying ABD 1# 4x8       Therapeutic activities to improve functional performance including:  UBE single arm 4m fwd/4m backward level 2 backward and level 6 forward   Landmine 4# 4x15     Therapeutic exercises to develop strength, endurance, and ROM including:  Lat stretch supine 1# 15x 10s holds          Time Entry(in minutes):  Manual Therapy Time Entry: 8  Neuromuscular Re-Education Time Entry: 28  Therapeutic Activity Time Entry: 8  Therapeutic Exercise Time Entry: 8    Assessment & Plan   Assessment: Pt tolerated therex well, cont to focus on progressive strengthening. Appropriately challenged by sidelying ABD.  Evaluation/Treatment Tolerance: Patient tolerated treatment well    Patient will continue to benefit from skilled outpatient physical therapy to address the deficits listed in the problem list box on initial evaluation, provide pt/family education and to maximize pt's level of independence in the home and community environment.     Patient's spiritual, cultural, and educational needs considered and patient agreeable to plan of care and goals.           Plan: Cont with POC      Goals:   Short Term Goals: 6 weeks  1. Pt will be compliant with HEP 50% of prescribed amount. MET  2. The pt to demo improvement in L shoulder PROM to by 80% of the R MET  3.  The pt to demo tolerance to being out of the sling for 24 hours with pain <2/10 MET     Long Term Goals: 24 weeks   Pt will be compliant with % of prescribed amount.  (Progressing, not met)  Pt will score 35% or better on FOTO Shoulder Mobility(Progressing, not met)  The pt to improvement in AROM of L shoulder within 80% of R shoulder to improve tolerance to OH movement (Progressing, not met)  The pt to  Demo at least 4+/5 of RTC muscle testing to demo improvement in tolerance to activity(Progressing, not met)  The pt to tolerate lifting 10# overhead to improve tolerance to ADLs and work related activities (Progressing, not met)  The pt will report full participation in ADLs and IADLs without restrictions related to L Shoulder.  (Progressing, not met)       Mariama Dennis, PT

## 2025-04-29 ENCOUNTER — TELEPHONE (OUTPATIENT)
Dept: SPORTS MEDICINE | Facility: CLINIC | Age: 45
End: 2025-04-29
Payer: COMMERCIAL

## 2025-05-01 ENCOUNTER — CLINICAL SUPPORT (OUTPATIENT)
Dept: REHABILITATION | Facility: HOSPITAL | Age: 45
End: 2025-05-01
Payer: COMMERCIAL

## 2025-05-01 DIAGNOSIS — M25.612 DECREASED RANGE OF MOTION OF LEFT SHOULDER: Primary | ICD-10-CM

## 2025-05-01 DIAGNOSIS — R29.898 DECREASED STRENGTH OF UPPER EXTREMITY: ICD-10-CM

## 2025-05-01 DIAGNOSIS — M25.612 DECREASED RANGE OF MOTION OF SHOULDER, LEFT: ICD-10-CM

## 2025-05-01 PROCEDURE — 97750 PHYSICAL PERFORMANCE TEST: CPT

## 2025-05-01 PROCEDURE — 97112 NEUROMUSCULAR REEDUCATION: CPT

## 2025-05-01 PROCEDURE — 97530 THERAPEUTIC ACTIVITIES: CPT

## 2025-05-05 ENCOUNTER — CLINICAL SUPPORT (OUTPATIENT)
Dept: REHABILITATION | Facility: HOSPITAL | Age: 45
End: 2025-05-05
Payer: COMMERCIAL

## 2025-05-05 DIAGNOSIS — M25.612 DECREASED RANGE OF MOTION OF LEFT SHOULDER: Primary | ICD-10-CM

## 2025-05-05 DIAGNOSIS — M25.612 DECREASED RANGE OF MOTION OF SHOULDER, LEFT: ICD-10-CM

## 2025-05-05 DIAGNOSIS — R29.898 DECREASED STRENGTH OF UPPER EXTREMITY: ICD-10-CM

## 2025-05-05 PROCEDURE — 97530 THERAPEUTIC ACTIVITIES: CPT

## 2025-05-05 PROCEDURE — 97112 NEUROMUSCULAR REEDUCATION: CPT

## 2025-05-06 NOTE — PROGRESS NOTES
Outpatient Rehab    Physical Therapy Visit    Patient Name: Felix Stern  MRN: [id number]  YOB: 1980  Encounter Date: 5/1/2025    Therapy Diagnosis:   No diagnosis found.        Physician: Marcin Viera III, *    Physician Orders: Eval and Treat  Medical Diagnosis: Traumatic rotator cuff tear, left, initial encounter    Visit # / Visits Authorized:  19 / 40  Insurance Authorization Period: 1/1/2025 to 12/31/2025  Date of Evaluation: 1/16/2025  Plan of Care Certification: 1/16/2025 to 6/10/2025       Time In:     Time Out:    Total Time:    Total Billable Time: 38    DOS: 12/19/2024  S/p: left  1. Shoulder arthroscopic rotator cuff repair 50 x 25 mm, large, double row (CPT 86563)  (complex -22 modifier)  2. Shoulder arthroscopic biceps tenodesis (CPT 30381)  3. Shoulder arthroscopic subacromial decompression, bursectomy   4. Shoulder arthroscopic extensive debridement (anterior, posterior glenohumeral joint, subacromial space) (CPT 54288)  5. Shoulder arthroscopic labral debridement (CPT 20971)  6. Shoulder arthroscopic microfracture (Crimson duvet technique) (CPT 83260)  7. Shoulder arthroscopic lysis of adhesions (CPT 45476)  Post-Op Precautions: We will follow the arthroscopic rotator cuff repair guidelines for a large size rotator cuff tear.  We discussed with the patient's family after surgery.  The patient will remain in a sling for 6 weeks.  PT to start at 4 weeks.            Subjective   Overall feeling good, no pain in shoulder..  Pain reported as 1/10.      Objective        Shoulder Flexion 135 pre / 145 post R UE   Shoulder Flexon L 165  Shoulder ER 50 pre/ 65 post L UE  Shoulder ER L 75   Shoulder ABD 80 deg with scap elevation L    Treatment:  Physical Performance Testing x18m         Neuromuscular re-education x 22m activities to improve: motor control, including  Sidelying ER 1# 3x15   AAROM dowel with post cuff activaiton supine 30x    Scaption 2# 5R with L iso hold/ 5L  with R iso hold / 5 B 3 rds   Sidelying ABD 1# 4x8       Therapeutic activities x15m to improve functional performance including:  UBE single arm 4m fwd/4m backward level 2 backward and level 6 forward   Landmine 4# 4x15     Therapeutic exercises to develop strength, endurance, and ROM including:           Time Entry(in minutes):       Assessment & Plan   Assessment: The patient with good tolerance to HHD strength testing today. Noted weakness in post cuff and supraspinatus ms as exected at this time.  Evaluation/Treatment Tolerance: Patient tolerated treatment well    Patient will continue to benefit from skilled outpatient physical therapy to address the deficits listed in the problem list box on initial evaluation, provide pt/family education and to maximize pt's level of independence in the home and community environment.     Patient's spiritual, cultural, and educational needs considered and patient agreeable to plan of care and goals.           Plan: Cont with POC      Goals:   Short Term Goals: 6 weeks  1. Pt will be compliant with HEP 50% of prescribed amount. MET  2. The pt to demo improvement in L shoulder PROM to by 80% of the R MET  3.  The pt to demo tolerance to being out of the sling for 24 hours with pain <2/10 MET     Long Term Goals: 24 weeks   Pt will be compliant with % of prescribed amount. (Progressing, not met)  Pt will score 35% or better on FOTO Shoulder Mobility(Progressing, not met)  The pt to improvement in AROM of L shoulder within 80% of R shoulder to improve tolerance to OH movement (Progressing, not met)  The pt to  Demo at least 4+/5 of RTC muscle testing to demo improvement in tolerance to activity(Progressing, not met)  The pt to tolerate lifting 10# overhead to improve tolerance to ADLs and work related activities (Progressing, not met)  The pt will report full participation in ADLs and IADLs without restrictions related to L Shoulder.  (Progressing, not met)       Mariama  Sonny, PT

## 2025-05-06 NOTE — PROGRESS NOTES
Outpatient Rehab    Physical Therapy Visit    Patient Name: Felix Stern  MRN: [id number]  YOB: 1980  Encounter Date: 5/5/2025    Therapy Diagnosis:   Encounter Diagnoses   Name Primary?    Decreased range of motion of left shoulder Yes    Decreased strength of upper extremity     Decreased range of motion of shoulder, left            Physician: Marcin Viera III, *    Physician Orders: Eval and Treat  Medical Diagnosis: Traumatic rotator cuff tear, left, initial encounter    Visit # / Visits Authorized:  19 / 40  Insurance Authorization Period: 1/1/2025 to 12/31/2025  Date of Evaluation: 1/16/2025  Plan of Care Certification: 1/16/2025 to 6/10/2025       Time In: 1013   Time Out: 1100  Total Time: 47  Total Billable Time: 38    DOS: 12/19/2024  S/p: left  1. Shoulder arthroscopic rotator cuff repair 50 x 25 mm, large, double row (CPT 90193)  (complex -22 modifier)  2. Shoulder arthroscopic biceps tenodesis (CPT 37669)  3. Shoulder arthroscopic subacromial decompression, bursectomy   4. Shoulder arthroscopic extensive debridement (anterior, posterior glenohumeral joint, subacromial space) (CPT 05171)  5. Shoulder arthroscopic labral debridement (CPT 37258)  6. Shoulder arthroscopic microfracture (Crimson duvet technique) (CPT 93907)  7. Shoulder arthroscopic lysis of adhesions (CPT 82226)  Post-Op Precautions: We will follow the arthroscopic rotator cuff repair guidelines for a large size rotator cuff tear.  We discussed with the patient's family after surgery.  The patient will remain in a sling for 6 weeks.  PT to start at 4 weeks.            Subjective   Felt fine after strength testing..  Pain reported as 0/10.      Objective        Shoulder Flexion 135 pre / 145 post R UE   Shoulder Flexon L 165  Shoulder ER 50 pre/ 65 post L UE  Shoulder ER L 75   Shoulder ABD 80 deg with scap elevation L          Treatment     Neuromuscular re-education x 35 m activities to improve: motor control,  including  Sidelying ER 1# 3x15 tempo   Scaption 2# 5R with L iso hold/ 5L with R iso hold / 5 B 3 rds   Sidelying ABD 1# 4x8  Sidelying horizontal ABD 3x10 1#    Prone mid-trap 2x 20 reps 5s holds     Therapeutic activities x12m to improve functional performance including:  UBE single arm 4m fwd/4m backward level 2 backward and level 6 forward   Robots on wall 3x15     Therapeutic exercises to develop strength, endurance, and ROM including:           Time Entry(in minutes):       Assessment & Plan   Assessment: Pt with good tolernace to new therex, difficulty with mid trap activiton but no pain during mvoements. ADvised on HEP to complete must include SL ER.  Evaluation/Treatment Tolerance: Patient tolerated treatment well    Patient will continue to benefit from skilled outpatient physical therapy to address the deficits listed in the problem list box on initial evaluation, provide pt/family education and to maximize pt's level of independence in the home and community environment.     Patient's spiritual, cultural, and educational needs considered and patient agreeable to plan of care and goals.           Plan: Cont with POC      Goals:   Short Term Goals: 6 weeks  1. Pt will be compliant with HEP 50% of prescribed amount. MET  2. The pt to demo improvement in L shoulder PROM to by 80% of the R MET  3.  The pt to demo tolerance to being out of the sling for 24 hours with pain <2/10 MET     Long Term Goals: 24 weeks   Pt will be compliant with % of prescribed amount. (Progressing, not met)  Pt will score 35% or better on FOTO Shoulder Mobility(Progressing, not met)  The pt to improvement in AROM of L shoulder within 80% of R shoulder to improve tolerance to OH movement (Progressing, not met)  The pt to  Demo at least 4+/5 of RTC muscle testing to demo improvement in tolerance to activity(Progressing, not met)  The pt to tolerate lifting 10# overhead to improve tolerance to ADLs and work related activities  (Progressing, not met)  The pt will report full participation in ADLs and IADLs without restrictions related to L Shoulder.  (Progressing, not met)       Mariama Dennis, PT

## 2025-05-09 DIAGNOSIS — I10 HTN (HYPERTENSION), BENIGN: ICD-10-CM

## 2025-05-09 RX ORDER — LOSARTAN POTASSIUM 100 MG/1
100 TABLET ORAL
Qty: 90 TABLET | Refills: 0 | Status: SHIPPED | OUTPATIENT
Start: 2025-05-09

## 2025-05-09 NOTE — TELEPHONE ENCOUNTER
Care Due:                  Date            Visit Type   Department     Provider  --------------------------------------------------------------------------------                                MYCHART                              FOLLOWUP/OF  [facility]  Last Visit: 01-      [facility] VISIT   BEAU Muñoz  Next Visit: None Scheduled  None         None Found                                                            Last  Test          Frequency    Reason                     Performed    Due Date  --------------------------------------------------------------------------------    Office Visit  15 months..  amLODIPine, losartan,      01- 03-                             sumatriptan..............    [employer] Embedded Care Due Messages. Reference number: [id number].   5/09/2025 12:18:33 AM CDT

## 2025-05-12 ENCOUNTER — OFFICE VISIT (OUTPATIENT)
Dept: SPORTS MEDICINE | Facility: CLINIC | Age: 45
End: 2025-05-12
Payer: COMMERCIAL

## 2025-05-12 VITALS
BODY MASS INDEX: 27.6 KG/M2 | WEIGHT: 175.81 LBS | HEART RATE: 85 BPM | HEIGHT: 67 IN | SYSTOLIC BLOOD PRESSURE: 149 MMHG | DIASTOLIC BLOOD PRESSURE: 109 MMHG

## 2025-05-12 DIAGNOSIS — Z98.890 S/P ROTATOR CUFF SURGERY: Primary | ICD-10-CM

## 2025-05-12 PROCEDURE — 3008F BODY MASS INDEX DOCD: CPT | Mod: CPTII,S$GLB,, | Performed by: ORTHOPAEDIC SURGERY

## 2025-05-12 PROCEDURE — 4010F ACE/ARB THERAPY RXD/TAKEN: CPT | Mod: CPTII,S$GLB,, | Performed by: ORTHOPAEDIC SURGERY

## 2025-05-12 PROCEDURE — 3080F DIAST BP >= 90 MM HG: CPT | Mod: CPTII,S$GLB,, | Performed by: ORTHOPAEDIC SURGERY

## 2025-05-12 PROCEDURE — 99214 OFFICE O/P EST MOD 30 MIN: CPT | Mod: S$GLB,,, | Performed by: ORTHOPAEDIC SURGERY

## 2025-05-12 PROCEDURE — 99999 PR PBB SHADOW E&M-EST. PATIENT-LVL III: CPT | Mod: PBBFAC,,, | Performed by: ORTHOPAEDIC SURGERY

## 2025-05-12 PROCEDURE — 1159F MED LIST DOCD IN RCRD: CPT | Mod: CPTII,S$GLB,, | Performed by: ORTHOPAEDIC SURGERY

## 2025-05-12 PROCEDURE — 3077F SYST BP >= 140 MM HG: CPT | Mod: CPTII,S$GLB,, | Performed by: ORTHOPAEDIC SURGERY

## 2025-05-12 NOTE — PROGRESS NOTES
Chief Complaint: left shoulder pain     HISTORY OF PRESENT ILLNESS:   Pt is here today for 5 months post-operative followup of shoulder arthroscopy.  He is doing well.  We have reviewed patient's findings and discussed plan of care and future treatment options.      Tolerating pain well.   Wearing sling which is in place.  Rates pain at 0/10  No issues reported.   Ry Riley    DATE OF PROCEDURE: 12/19/2024  SURGEON: Jossy Samayoa M.D.  OPERATION:   left  1. Shoulder arthroscopic rotator cuff repair 50 x 25 mm, large, double row (CPT 88808)  (complex -22 modifier)  2. Shoulder arthroscopic biceps tenodesis (CPT 60263)  3. Shoulder arthroscopic subacromial decompression, bursectomy   4. Shoulder arthroscopic extensive debridement (anterior, posterior glenohumeral joint, subacromial space) (CPT 81721)  5. Shoulder arthroscopic labral debridement (CPT 44892)  6. Shoulder arthroscopic microfracture (Crimson duvet technique) (CPT 57222)  7. Shoulder arthroscopic lysis of adhesions (CPT 72677)       PHYSICAL EXAMINATION:     Incision sites healed well  No evidence of any erythema, infection or induration  elbow Range of motion full   Minimal effusion  2+ Radial pulses  Capillary refill < 3secs  No swelling    ER 0 60  IR T10  scaption 5/5 at 0 and 30                                                                            ASSESSMENT:                                                                                                                                               1. Status post above, doing well.                                                                                                                               PLAN:                                                                                                                                                     1. PT, Discussed with PT.  2. Emphasized scapular function.  3. I have discussed return to activity in detail.  4. Patient will see us back  in 12 weeks.                                        Discussed case with PT.    All questions were answered, surgical technique was reviewed and interpreted, and patient should contact us with any questions or concerns in the interim.

## 2025-05-14 ENCOUNTER — CLINICAL SUPPORT (OUTPATIENT)
Dept: REHABILITATION | Facility: HOSPITAL | Age: 45
End: 2025-05-14
Payer: COMMERCIAL

## 2025-05-14 DIAGNOSIS — R29.898 DECREASED STRENGTH OF UPPER EXTREMITY: Primary | ICD-10-CM

## 2025-05-14 PROCEDURE — 97530 THERAPEUTIC ACTIVITIES: CPT

## 2025-05-14 PROCEDURE — 97112 NEUROMUSCULAR REEDUCATION: CPT

## 2025-05-14 NOTE — PROGRESS NOTES
Outpatient Rehab    Physical Therapy Visit    Patient Name: Felix Stern  MRN: [id number]  YOB: 1980  Encounter Date: 5/14/2025    Therapy Diagnosis:   Encounter Diagnosis   Name Primary?    Decreased strength of upper extremity Yes     Physician: Marcin Viera III, *    Physician Orders: Eval and Treat  Medical Diagnosis: Traumatic rotator cuff tear, left, initial encounter    Visit # / Visits Authorized:  20 / 40  Insurance Authorization Period: 1/1/2025 to 12/31/2025  Date of Evaluation: 1/16/2025  Plan of Care Certification: 1/16/2025 to 6/10/2025       Time In: 0915   Time Out: 1000  Total Time: 45  Total Billable Time: 45    DOS: 12/19/2024  S/p: left  1. Shoulder arthroscopic rotator cuff repair 50 x 25 mm, large, double row (CPT 28283)  (complex -22 modifier)  2. Shoulder arthroscopic biceps tenodesis (CPT 65975)  3. Shoulder arthroscopic subacromial decompression, bursectomy   4. Shoulder arthroscopic extensive debridement (anterior, posterior glenohumeral joint, subacromial space) (CPT 33016)  5. Shoulder arthroscopic labral debridement (CPT 70632)  6. Shoulder arthroscopic microfracture (Crimson duvet technique) (CPT 12777)  7. Shoulder arthroscopic lysis of adhesions (CPT 57544)  Post-Op Precautions: We will follow the arthroscopic rotator cuff repair guidelines for a large size rotator cuff tear.  We discussed with the patient's family after surgery.  The patient will remain in a sling for 6 weeks.  PT to start at 4 weeks.            Subjective   feeling good, just thinks that his strength and power are his major limitors..  Pain reported as 0/10.      Objective                 Treatment     Neuromuscular re-education x 15 m activities to improve: motor control, including  Sidelying ABD 1# 4x8  Sidelying horizontal ABD 4x8 1#      Therapeutic activities x30m to improve functional performance including:  UBE single arm 5m fwd/5m backward level 6 single arm intermittently    Bicep Curls 5# 3x10   Bent over rows 8# 3x15   Floor Press 8# 4x8   OH Press 1# 5x5    Therapeutic exercises to develop strength, endurance, and ROM including:       Time Entry(in minutes): see above        Assessment & Plan   Assessment: The patient tolerated progression to functional activities well, no pain during exercises, required cueing for posture.  Evaluation/Treatment Tolerance: Patient tolerated treatment well    Patient will continue to benefit from skilled outpatient physical therapy to address the deficits listed in the problem list box on initial evaluation, provide pt/family education and to maximize pt's level of independence in the home and community environment.     Patient's spiritual, cultural, and educational needs considered and patient agreeable to plan of care and goals.           Plan:  Focus on progressive strengthening       Goals:   Short Term Goals: 6 weeks  1. Pt will be compliant with HEP 50% of prescribed amount. MET  2. The pt to demo improvement in L shoulder PROM to by 80% of the R MET  3.  The pt to demo tolerance to being out of the sling for 24 hours with pain <2/10 MET     Long Term Goals: 24 weeks   Pt will be compliant with % of prescribed amount. ( met)  Pt will score 35% or better on FOTO Shoulder Mobility(Progressing, not met)  The pt to improvement in AROM of L shoulder within 80% of R shoulder to improve tolerance to OH movement (Progressing, not met)  The pt to  Demo at least 4+/5 of RTC muscle testing to demo improvement in tolerance to activity(Progressing, not met)  The pt to tolerate lifting 10# overhead to improve tolerance to ADLs and work related activities (Progressing, not met)  The pt will report full participation in ADLs and IADLs without restrictions related to L Shoulder.  (Progressing, not met)       Mariama Dennis, PT

## 2025-05-16 ENCOUNTER — CLINICAL SUPPORT (OUTPATIENT)
Dept: REHABILITATION | Facility: HOSPITAL | Age: 45
End: 2025-05-16
Payer: COMMERCIAL

## 2025-05-16 DIAGNOSIS — R29.898 DECREASED STRENGTH OF UPPER EXTREMITY: ICD-10-CM

## 2025-05-16 DIAGNOSIS — M25.612 DECREASED RANGE OF MOTION OF LEFT SHOULDER: Primary | ICD-10-CM

## 2025-05-16 DIAGNOSIS — M25.612 DECREASED RANGE OF MOTION OF SHOULDER, LEFT: ICD-10-CM

## 2025-05-16 PROCEDURE — 97112 NEUROMUSCULAR REEDUCATION: CPT

## 2025-05-16 PROCEDURE — 97530 THERAPEUTIC ACTIVITIES: CPT

## 2025-05-16 PROCEDURE — 97110 THERAPEUTIC EXERCISES: CPT

## 2025-05-16 NOTE — PROGRESS NOTES
Outpatient Rehab    Physical Therapy Visit    Patient Name: Felix Stern  MRN: [id number]  YOB: 1980  Encounter Date: 5/16/2025    Therapy Diagnosis:   No diagnosis found.    Physician: Marcin Viera III, *    Physician Orders: Eval and Treat  Medical Diagnosis: Traumatic rotator cuff tear, left, initial encounter    Visit # / Visits Authorized:  21 / 40  Insurance Authorization Period: 1/1/2025 to 12/31/2025  Date of Evaluation: 1/16/2025  Plan of Care Certification: 1/16/2025 to 6/10/2025       Time In:     Time Out:    Total Time:    Total Billable Time: 45    DOS: 12/19/2024  S/p: left  1. Shoulder arthroscopic rotator cuff repair 50 x 25 mm, large, double row (CPT 18961)  (complex -22 modifier)  2. Shoulder arthroscopic biceps tenodesis (CPT 66073)  3. Shoulder arthroscopic subacromial decompression, bursectomy   4. Shoulder arthroscopic extensive debridement (anterior, posterior glenohumeral joint, subacromial space) (CPT 98414)  5. Shoulder arthroscopic labral debridement (CPT 40942)  6. Shoulder arthroscopic microfracture (Crimson duvet technique) (CPT 84943)  7. Shoulder arthroscopic lysis of adhesions (CPT 18403)  Post-Op Precautions: We will follow the arthroscopic rotator cuff repair guidelines for a large size rotator cuff tear.  We discussed with the patient's family after surgery.  The patient will remain in a sling for 6 weeks.  PT to start at 4 weeks.            Subjective   felt like his midback was very stiff and sore..         Objective                 Treatment     Manual therapy techniques: Joint mobilizations were applied to the: mid back/shld for 03 minutes, including:  Thoracic manip       Neuromuscular re-education x 12 m activities to improve: motor control, including  ER resisted 5s pause 3x8 otb       Therapeutic activities x28m to improve functional performance including:  Bicep curl to OH press 5# 3x5    carry 5# KB <-> 3x   MB wall rolls 4# 2x; 8#  2x; 10x 2x10     Therapeutic exercises x12m  to develop strength, endurance, and ROM including:   Thoracic rotations 15x B   Education on HEP     Time Entry(in minutes): see above        Assessment & Plan   Assessment: The pt with good progression to functional movement, difficulty noted on resisted ER. Advised on importance of RTC strengthening at home 2-3x a week at home.  Evaluation/Treatment Tolerance: Patient tolerated treatment well    Patient will continue to benefit from skilled outpatient physical therapy to address the deficits listed in the problem list box on initial evaluation, provide pt/family education and to maximize pt's level of independence in the home and community environment.     Patient's spiritual, cultural, and educational needs considered and patient agreeable to plan of care and goals.           Plan: focus on functional activity with ER focusFocus on progressive strengthening       Goals:   Short Term Goals: 6 weeks  1. Pt will be compliant with HEP 50% of prescribed amount. MET  2. The pt to demo improvement in L shoulder PROM to by 80% of the R MET  3.  The pt to demo tolerance to being out of the sling for 24 hours with pain <2/10 MET     Long Term Goals: 24 weeks   Pt will be compliant with % of prescribed amount. ( met)  Pt will score 35% or better on FOTO Shoulder Mobility(Progressing, not met)  The pt to improvement in AROM of L shoulder within 80% of R shoulder to improve tolerance to OH movement (METt)  The pt to  Demo at least 4+/5 of RTC muscle testing to demo improvement in tolerance to activity(MET)  The pt to tolerate lifting 10# overhead to improve tolerance to ADLs and work related activities (Progressing, not met)  The pt will report full participation in ADLs and IADLs without restrictions related to L Shoulder.  (Progressing, not met)       Mariama Dennis, PT

## 2025-05-21 ENCOUNTER — CLINICAL SUPPORT (OUTPATIENT)
Dept: REHABILITATION | Facility: HOSPITAL | Age: 45
End: 2025-05-21
Payer: COMMERCIAL

## 2025-05-21 DIAGNOSIS — M25.612 DECREASED RANGE OF MOTION OF LEFT SHOULDER: Primary | ICD-10-CM

## 2025-05-21 DIAGNOSIS — R29.898 DECREASED STRENGTH OF UPPER EXTREMITY: ICD-10-CM

## 2025-05-21 DIAGNOSIS — M25.612 DECREASED RANGE OF MOTION OF SHOULDER, LEFT: ICD-10-CM

## 2025-05-21 PROCEDURE — 97530 THERAPEUTIC ACTIVITIES: CPT

## 2025-05-21 PROCEDURE — 97110 THERAPEUTIC EXERCISES: CPT

## 2025-05-21 PROCEDURE — 97112 NEUROMUSCULAR REEDUCATION: CPT

## 2025-05-22 NOTE — PROGRESS NOTES
Outpatient Rehab    Physical Therapy Visit    Patient Name: Felix Stern  MRN: [id number]  YOB: 1980  Encounter Date: 5/21/2025    Therapy Diagnosis:   Encounter Diagnoses   Name Primary?    Decreased range of motion of left shoulder Yes    Decreased strength of upper extremity     Decreased range of motion of shoulder, left        Physician: Marcin Viera III, *    Physician Orders: Eval and Treat  Medical Diagnosis: Traumatic rotator cuff tear, left, initial encounter    Visit # / Visits Authorized:  22 / 40  Insurance Authorization Period: 1/1/2025 to 12/31/2025  Date of Evaluation: 1/16/2025  Plan of Care Certification: 1/16/2025 to 6/10/2025       Time In: 0900   Time Out: 1000  Total Time: 60  Total Billable Time: 45    DOS: 12/19/2024  S/p: left  1. Shoulder arthroscopic rotator cuff repair 50 x 25 mm, large, double row (CPT 59960)  (complex -22 modifier)  2. Shoulder arthroscopic biceps tenodesis (CPT 58734)  3. Shoulder arthroscopic subacromial decompression, bursectomy   4. Shoulder arthroscopic extensive debridement (anterior, posterior glenohumeral joint, subacromial space) (CPT 98275)  5. Shoulder arthroscopic labral debridement (CPT 59590)  6. Shoulder arthroscopic microfracture (Crimson duvet technique) (CPT 23654)  7. Shoulder arthroscopic lysis of adhesions (CPT 33110)  Post-Op Precautions: We will follow the arthroscopic rotator cuff repair guidelines for a large size rotator cuff tear.  We discussed with the patient's family after surgery.  The patient will remain in a sling for 6 weeks.  PT to start at 4 weeks.            Subjective   he cooked a lot of crawfish this weekend and now his shoulders are very sore..  Pain reported as 2/10.      Objective                 Treatment     Manual therapy techniques: Joint mobilizations were applied to the: mid back/shld for 00 minutes, including:      Neuromuscular re-education x 18 m activities to improve: motor control,  including  ER tempo sidelying 5s pause 4x8 2#-1#  Plank 4x30s modified on knees   Hand heel rocks 20x3 rds      Therapeutic activities x28m to improve functional performance including:  Push-ups on wall 10x; 1x5   Push-ups on counter 4x5   carry 5# KB <-> 3x     Therapeutic exercises x12m  to develop strength, endurance, and ROM including:   Thoracic rotations 15x B  Ube 4m fwd/4m back level 4 single arm    Education on HEP   Tech used to A with treatment   Time Entry(in minutes): see above        Assessment & Plan   Assessment: The patient was limited by fatigue today ut otherwise tolerated progression of CKC exercises well. Will cont to work on improving functional strength. Retesting his MMT via [facility] in 3 weeks  Evaluation/Treatment Tolerance: Patient limited by fatigue    Patient will continue to benefit from skilled outpatient physical therapy to address the deficits listed in the problem list box on initial evaluation, provide pt/family education and to maximize pt's level of independence in the home and community environment.     Patient's spiritual, cultural, and educational needs considered and patient agreeable to plan of care and goals.           Plan:  Focus on progressive strengthening       Goals:   Short Term Goals: 6 weeks  1. Pt will be compliant with HEP 50% of prescribed amount. MET  2. The pt to demo improvement in L shoulder PROM to by 80% of the R MET  3.  The pt to demo tolerance to being out of the sling for 24 hours with pain <2/10 MET     Long Term Goals: 24 weeks   Pt will be compliant with % of prescribed amount. ( met)  Pt will score 35% or better on FOTO Shoulder Mobility(Progressing, not met)  The pt to improvement in AROM of L shoulder within 80% of R shoulder to improve tolerance to OH movement (METt)  The pt to  Demo at least 4+/5 of RTC muscle testing to demo improvement in tolerance to activity(MET)  The pt to tolerate lifting 10# overhead to improve tolerance to ADLs  and work related activities (Progressing, not met)  The pt will report full participation in ADLs and IADLs without restrictions related to L Shoulder.  (Progressing, not met)       Mariama Dennis, PT

## 2025-06-05 ENCOUNTER — CLINICAL SUPPORT (OUTPATIENT)
Dept: REHABILITATION | Facility: HOSPITAL | Age: 45
End: 2025-06-05
Payer: COMMERCIAL

## 2025-06-05 DIAGNOSIS — R29.898 DECREASED STRENGTH OF UPPER EXTREMITY: ICD-10-CM

## 2025-06-05 DIAGNOSIS — M25.612 DECREASED RANGE OF MOTION OF LEFT SHOULDER: Primary | ICD-10-CM

## 2025-06-05 DIAGNOSIS — M25.612 DECREASED RANGE OF MOTION OF SHOULDER, LEFT: ICD-10-CM

## 2025-06-05 PROCEDURE — 97110 THERAPEUTIC EXERCISES: CPT

## 2025-06-05 PROCEDURE — 97530 THERAPEUTIC ACTIVITIES: CPT

## 2025-06-05 PROCEDURE — 97112 NEUROMUSCULAR REEDUCATION: CPT

## 2025-06-13 ENCOUNTER — PATIENT MESSAGE (OUTPATIENT)
Dept: REHABILITATION | Facility: HOSPITAL | Age: 45
End: 2025-06-13
Payer: COMMERCIAL

## 2025-06-20 ENCOUNTER — PATIENT MESSAGE (OUTPATIENT)
Dept: PRIMARY CARE CLINIC | Facility: CLINIC | Age: 45
End: 2025-06-20
Payer: COMMERCIAL

## 2025-06-20 ENCOUNTER — CLINICAL SUPPORT (OUTPATIENT)
Dept: REHABILITATION | Facility: HOSPITAL | Age: 45
End: 2025-06-20
Payer: COMMERCIAL

## 2025-06-20 DIAGNOSIS — R29.898 DECREASED STRENGTH OF UPPER EXTREMITY: ICD-10-CM

## 2025-06-20 DIAGNOSIS — M25.612 DECREASED RANGE OF MOTION OF LEFT SHOULDER: Primary | ICD-10-CM

## 2025-06-20 DIAGNOSIS — M25.612 DECREASED RANGE OF MOTION OF SHOULDER, LEFT: ICD-10-CM

## 2025-06-20 PROCEDURE — 97110 THERAPEUTIC EXERCISES: CPT | Mod: CQ

## 2025-06-20 PROCEDURE — 97530 THERAPEUTIC ACTIVITIES: CPT | Mod: CQ

## 2025-06-20 PROCEDURE — 97112 NEUROMUSCULAR REEDUCATION: CPT | Mod: CQ

## 2025-06-20 NOTE — PROGRESS NOTES
Outpatient Rehab    Physical Therapy Visit    Patient Name: Felix Stern  MRN: [id number]  YOB: 1980  Encounter Date: 6/20/2025    Therapy Diagnosis:   Encounter Diagnoses   Name Primary?    Decreased range of motion of left shoulder Yes    Decreased strength of upper extremity     Decreased range of motion of shoulder, left        Physician: Marcin Viera III, *    Physician Orders: Eval and Treat  Medical Diagnosis: Traumatic rotator cuff tear, left, initial encounter    Visit # / Visits Authorized:  24 / 40  Insurance Authorization Period: 1/1/2025 to 12/31/2025  Date of Evaluation: 1/16/2025  Plan of Care Certification: 1/16/2025 to 6/10/2025       Time In: 0917   Time Out: 1008  Total Time: 51  Total Billable Time: 51    DOS: 12/19/2024  S/p: left  1. Shoulder arthroscopic rotator cuff repair 50 x 25 mm, large, double row (CPT 20855)  (complex -22 modifier)  2. Shoulder arthroscopic biceps tenodesis (CPT 94975)  3. Shoulder arthroscopic subacromial decompression, bursectomy   4. Shoulder arthroscopic extensive debridement (anterior, posterior glenohumeral joint, subacromial space) (CPT 10906)  5. Shoulder arthroscopic labral debridement (CPT 11589)  6. Shoulder arthroscopic microfracture (Crimson duvet technique) (CPT 55004)  7. Shoulder arthroscopic lysis of adhesions (CPT 85270)  Post-Op Precautions: We will follow the arthroscopic rotator cuff repair guidelines for a large size rotator cuff tear.  We discussed with the patient's family after surgery.  The patient will remain in a sling for 6 weeks.  PT to start at 4 weeks.            Subjective   shoulder is getting better.         Objective                 Treatment     Manual therapy techniques: Joint mobilizations were applied to the: mid back/shld for 00 minutes, including:      Neuromuscular re-education x 23 m activities to improve: motor control, including  ABCs 3x10   High 90-90 IR 3x15   Genie IR 3x15 gtb   Genie ER 3x15  otb  90/90 t/s rotaiton 4 x fatigue OTB  QPED 90 flexion ball on wall    Therapeutic activities x15 m to improve functional performance including:  Seated OH Press 4# 3x10   carry 5# KB <-> 4x     Therapeutic exercises x13 m  to develop strength, endurance, and ROM including:  Bicep Curls 3x10 8#  Ube 4m fwd/4m back level 4 single arm        Tech used to A with treatment     Time Entry(in minutes): see above   Neuromuscular Re-Education Time Entry: 23  Therapeutic Activity Time Entry: 15  Therapeutic Exercise Time Entry: 13    Assessment & Plan   Assessment: Felix overall is doing better. Still shows weakness with OH strength. Was challenged maintaining ER with with prolong holds. Will cont to progress shoulder strength into OH movements.       Patient will continue to benefit from skilled outpatient physical therapy to address the deficits listed in the problem list box on initial evaluation, provide pt/family education and to maximize pt's level of independence in the home and community environment.     Patient's spiritual, cultural, and educational needs considered and patient agreeable to plan of care and goals.           Plan:  Focus on progressive strengthening       Goals:   Short Term Goals: 6 weeks  1. Pt will be compliant with HEP 50% of prescribed amount. MET  2. The pt to demo improvement in L shoulder PROM to by 80% of the R MET  3.  The pt to demo tolerance to being out of the sling for 24 hours with pain <2/10 MET     Long Term Goals: 24 weeks   Pt will be compliant with % of prescribed amount. ( met)  Pt will score 35% or better on FOTO Shoulder Mobility(Progressing, not met)  The pt to improvement in AROM of L shoulder within 80% of R shoulder to improve tolerance to OH movement (METt)  The pt to  Demo at least 4+/5 of RTC muscle testing to demo improvement in tolerance to activity(MET)  The pt to tolerate lifting 10# overhead to improve tolerance to ADLs and work related activities  (Progressing, not met)  The pt will report full participation in ADLs and IADLs without restrictions related to L Shoulder.  (Progressing, not met)       Merly Dahl, PTA

## 2025-07-02 ENCOUNTER — CLINICAL SUPPORT (OUTPATIENT)
Dept: REHABILITATION | Facility: HOSPITAL | Age: 45
End: 2025-07-02
Payer: COMMERCIAL

## 2025-07-02 DIAGNOSIS — M25.612 DECREASED RANGE OF MOTION OF SHOULDER, LEFT: ICD-10-CM

## 2025-07-02 DIAGNOSIS — R29.898 DECREASED STRENGTH OF UPPER EXTREMITY: ICD-10-CM

## 2025-07-02 DIAGNOSIS — M25.612 DECREASED RANGE OF MOTION OF LEFT SHOULDER: Primary | ICD-10-CM

## 2025-07-02 PROCEDURE — 97530 THERAPEUTIC ACTIVITIES: CPT | Mod: CQ

## 2025-07-02 PROCEDURE — 97112 NEUROMUSCULAR REEDUCATION: CPT | Mod: CQ

## 2025-07-02 PROCEDURE — 97110 THERAPEUTIC EXERCISES: CPT | Mod: CQ

## 2025-07-02 NOTE — PROGRESS NOTES
Outpatient Rehab    Physical Therapy Visit    Patient Name: Felix Stern  MRN: [id number]  YOB: 1980  Encounter Date: 7/2/2025    Therapy Diagnosis:   Encounter Diagnoses   Name Primary?    Decreased range of motion of left shoulder Yes    Decreased strength of upper extremity     Decreased range of motion of shoulder, left        Physician: Marcin Viera III, *    Physician Orders: Eval and Treat  Medical Diagnosis: Traumatic rotator cuff tear, left, initial encounter    Visit # / Visits Authorized:  25 / 40  Insurance Authorization Period: 1/1/2025 to 12/31/2025  Date of Evaluation: 1/16/2025  Plan of Care Certification: 1/16/2025 to 6/10/2025       Time In: 0915   Time Out: 1000  Total Time: 45  Total Billable Time: 51    DOS: 12/19/2024  S/p: left  1. Shoulder arthroscopic rotator cuff repair 50 x 25 mm, large, double row (CPT 78619)  (complex -22 modifier)  2. Shoulder arthroscopic biceps tenodesis (CPT 92389)  3. Shoulder arthroscopic subacromial decompression, bursectomy   4. Shoulder arthroscopic extensive debridement (anterior, posterior glenohumeral joint, subacromial space) (CPT 27757)  5. Shoulder arthroscopic labral debridement (CPT 01426)  6. Shoulder arthroscopic microfracture (Crimson duvet technique) (CPT 89157)  7. Shoulder arthroscopic lysis of adhesions (CPT 70466)  Post-Op Precautions: We will follow the arthroscopic rotator cuff repair guidelines for a large size rotator cuff tear.  We discussed with the patient's family after surgery.  The patient will remain in a sling for 6 weeks.  PT to start at 4 weeks.            Subjective             Objective                 Treatment     Manual therapy techniques: Joint mobilizations were applied to the: mid back/shld for 00 minutes, including:      Neuromuscular re-education x 23 m activities to improve: motor control, including  ABCs 3x10   High 90-90 IR 3x15   Genie IR 3x15 Btb   Genie ER 3x15 otb  90/90 t/s rotaiton 4  x fatigue YTB  QPED 90 flexion ball on wall    Therapeutic activities x10 m to improve functional performance including:  Seated OH Press 4# 3x10   carry 5# KB <-> 4x     Therapeutic exercises x12 m  to develop strength, endurance, and ROM including:  Bicep Curls 3x10 8#  Ube 4m fwd/4m back level 4 single arm        Tech used to A with treatment     Time Entry(in minutes): see above   Neuromuscular Re-Education Time Entry: 23  Therapeutic Activity Time Entry: 10  Therapeutic Exercise Time Entry: 12    Assessment & Plan   Assessment: Felix states that his bicep has been sore and that he feels it with certain exercises. Session focused on loading shoulder vs bicep with modification of resistance. Shoulder fatigue with  carry.        Patient will continue to benefit from skilled outpatient physical therapy to address the deficits listed in the problem list box on initial evaluation, provide pt/family education and to maximize pt's level of independence in the home and community environment.     Patient's spiritual, cultural, and educational needs considered and patient agreeable to plan of care and goals.           Plan:  Focus on progressive strengthening       Goals:   Short Term Goals: 6 weeks  1. Pt will be compliant with HEP 50% of prescribed amount. MET  2. The pt to demo improvement in L shoulder PROM to by 80% of the R MET  3.  The pt to demo tolerance to being out of the sling for 24 hours with pain <2/10 MET     Long Term Goals: 24 weeks   Pt will be compliant with % of prescribed amount. ( met)  Pt will score 35% or better on FOTO Shoulder Mobility(Progressing, not met)  The pt to improvement in AROM of L shoulder within 80% of R shoulder to improve tolerance to OH movement (METt)  The pt to  Demo at least 4+/5 of RTC muscle testing to demo improvement in tolerance to activity(MET)  The pt to tolerate lifting 10# overhead to improve tolerance to ADLs and work related activities  (Progressing, not met)  The pt will report full participation in ADLs and IADLs without restrictions related to L Shoulder.  (Progressing, not met)       Merly Dahl, PTA

## 2025-07-28 ENCOUNTER — PATIENT OUTREACH (OUTPATIENT)
Dept: ADMINISTRATIVE | Facility: HOSPITAL | Age: 45
End: 2025-07-28
Payer: COMMERCIAL

## 2025-07-28 DIAGNOSIS — Z12.11 SCREENING FOR COLORECTAL CANCER: Primary | ICD-10-CM

## 2025-07-28 DIAGNOSIS — Z12.12 SCREENING FOR COLORECTAL CANCER: Primary | ICD-10-CM

## 2025-07-30 ENCOUNTER — PATIENT MESSAGE (OUTPATIENT)
Dept: GASTROENTEROLOGY | Facility: CLINIC | Age: 45
End: 2025-07-30
Payer: COMMERCIAL

## 2025-07-30 ENCOUNTER — TELEPHONE (OUTPATIENT)
Dept: GASTROENTEROLOGY | Facility: CLINIC | Age: 45
End: 2025-07-30
Payer: COMMERCIAL

## 2025-08-04 ENCOUNTER — TELEPHONE (OUTPATIENT)
Dept: GASTROENTEROLOGY | Facility: CLINIC | Age: 45
End: 2025-08-04
Payer: COMMERCIAL

## 2025-08-07 DIAGNOSIS — I10 HTN (HYPERTENSION), BENIGN: ICD-10-CM

## 2025-08-07 RX ORDER — AMLODIPINE BESYLATE 10 MG/1
10 TABLET ORAL
Qty: 90 TABLET | Refills: 0 | Status: SHIPPED | OUTPATIENT
Start: 2025-08-07

## 2025-08-07 RX ORDER — CITALOPRAM 20 MG/1
20 TABLET ORAL
Qty: 90 TABLET | Refills: 0 | Status: SHIPPED | OUTPATIENT
Start: 2025-08-07

## 2025-08-07 RX ORDER — LOSARTAN POTASSIUM 100 MG/1
100 TABLET ORAL
Qty: 90 TABLET | Refills: 0 | Status: SHIPPED | OUTPATIENT
Start: 2025-08-07

## 2025-08-07 NOTE — TELEPHONE ENCOUNTER
Care Due:                  Date            Visit Type   Department     Provider  --------------------------------------------------------------------------------                                MYCHART                              FOLLOWUP/OF  [facility]  Last Visit: 01-      [facility] VISIT   BEAU Muñoz  Next Visit: None Scheduled  None         None Found                                                            Last  Test          Frequency    Reason                     Performed    Due Date  --------------------------------------------------------------------------------    Office Visit  15 months..  amLODIPine, losartan.....  01- 03-    [facility] Embedded Care Due Messages. Reference number: [id number].   8/07/2025 12:17:59 AM CDT

## 2025-08-12 ENCOUNTER — TELEPHONE (OUTPATIENT)
Dept: ENDOSCOPY | Facility: HOSPITAL | Age: 45
End: 2025-08-12
Payer: COMMERCIAL

## 2025-08-18 ENCOUNTER — TELEPHONE (OUTPATIENT)
Dept: ENDOSCOPY | Facility: HOSPITAL | Age: 45
End: 2025-08-18
Payer: COMMERCIAL

## (undated) DEVICE — GLOVE ORTHO PF SZ 8.5

## (undated) DEVICE — TOWEL OR DISP STRL BLUE 4/PK

## (undated) DEVICE — CLOSURE SKIN STERI STRIP 1/2X4

## (undated) DEVICE — SPLINT PLASTER FS 5IN X 30IN

## (undated) DEVICE — Device

## (undated) DEVICE — SPONGE LAP 18X18 PREWASHED

## (undated) DEVICE — SYR IRRIGATION BULB STER 60ML

## (undated) DEVICE — CONNECTOR TUBING STR 5 IN 1

## (undated) DEVICE — SUT PDS VIL/BLU DUAL ORTHO

## (undated) DEVICE — UNDERGLOVES BIOGEL PI SIZE 7.5

## (undated) DEVICE — SUT 2 20 FIBERLOOP STR NDL

## (undated) DEVICE — SUT SUTURETAPE TIGERLINK 1.3MM

## (undated) DEVICE — ELECTRODE REM PLYHSV RETURN 9

## (undated) DEVICE — TUBING SUC UNIV W/CONN 12FT

## (undated) DEVICE — SHAVER ULTRAFFR 4.2MM

## (undated) DEVICE — PAD ABDOMINAL STERILE 8X10IN

## (undated) DEVICE — SUT MCRYL PLUS 4-0 PS2 27IN

## (undated) DEVICE — ADHESIVE MASTISOL VIAL 48/BX

## (undated) DEVICE — BUTTON PASSPORT CANN 2X4CM

## (undated) DEVICE — GAUZE SPONGE 4X4 12PLY

## (undated) DEVICE — DRAPE STERI INSTRUMENT 1018

## (undated) DEVICE — SUT 38 FIBERWIRE #2

## (undated) DEVICE — SLING SHOT II LARGE

## (undated) DEVICE — GOWN SMART IMP BREATHABLE XXLG

## (undated) DEVICE — NDL HYPO REG 25G X 1 1/2

## (undated) DEVICE — NDL SCORPIAN SUTURE PASSER

## (undated) DEVICE — PAD DERMAPROX THCK 11X15X1IN

## (undated) DEVICE — DRAPE THREE-QTR REINF 53X77IN

## (undated) DEVICE — TOURNIQUET SB QC DP 18X4IN

## (undated) DEVICE — SPLINT PLASTER FAST SET 5X30IN

## (undated) DEVICE — BLADE VORTEX SHAVER 4.5MMX13CM

## (undated) DEVICE — SOL NACL IRR 1000ML BTL

## (undated) DEVICE — BANDAGE MATRIX HK LOOP 4IN 5YD

## (undated) DEVICE — GLOVE SURGEON SYN PF SZ 9

## (undated) DEVICE — SYR DISP LL 5CC

## (undated) DEVICE — SYR B-D DISP CONTROL 10CC100/C

## (undated) DEVICE — BLADE SURG STAINLESS STEEL #15

## (undated) DEVICE — DRESSING XEROFORM FOIL PK 1X8

## (undated) DEVICE — PAD ABD 8X10 STERILE

## (undated) DEVICE — APPLICATOR CHLORAPREP ORN 26ML

## (undated) DEVICE — SYR LUER LOCK 1CC

## (undated) DEVICE — SPONGE COTTON TRAY 4X4IN

## (undated) DEVICE — COVER LIGHT HANDLE 80/CA

## (undated) DEVICE — DRESSING XEROFORM NONADH 1X8IN

## (undated) DEVICE — PAD CAST SPECIALIST STRL 4

## (undated) DEVICE — YANKAUER OPEN TIP W/O VENT

## (undated) DEVICE — SOL 9P NACL IRR PIC IL

## (undated) DEVICE — GOWN ECLIPSE REINF LVL4 TWL XL

## (undated) DEVICE — COVER CAMERA OPERATING ROOM

## (undated) DEVICE — PROBE ARTHO ENERGY 90 DEG

## (undated) DEVICE — DRAPE STERI U-SHAPED 47X51IN

## (undated) DEVICE — GOWN ECLIPSE REINF LV4 TWL 2XL

## (undated) DEVICE — BUCKET PLASTER DISPOSABLE

## (undated) DEVICE — DRAPE HAND STERILE

## (undated) DEVICE — NDL HYPO STD REG BVL 18GX1.5IN

## (undated) DEVICE — BNDG COFLEX FOAM LF2 ST 6X5YD

## (undated) DEVICE — GOWN ECLIPSE REINF LV4 XLNG XL

## (undated) DEVICE — SUT VICRYL PLUS 0 CT1 18IN

## (undated) DEVICE — PAD ELECTRODE STER 1.5X3

## (undated) DEVICE — GLOVE BIOGEL SKINSENSE PI 7.0

## (undated) DEVICE — NDL SURGEON MAYO #7 2/PK 72PKS

## (undated) DEVICE — DRAPE U SPLIT SHEET 54X76IN

## (undated) DEVICE — NDL SPINAL 18GX3.5 SPINOCAN

## (undated) DEVICE — TAPE MEDIPORE 4IN X 2YDS

## (undated) DEVICE — CANNULA ARTHRO 8.25MM X 7CM

## (undated) DEVICE — SOL NACL IRR 3000ML

## (undated) DEVICE — GLOVE SENSICARE PI GRN 7.5

## (undated) DEVICE — BNDG COFLEX FOAM LF2 ST 4X5YD

## (undated) DEVICE — DRAPE INCISE IOBAN 2 23X17IN

## (undated) DEVICE — CORD FOR BIPOLAR FORCEPS 12

## (undated) DEVICE — DRAPE SURG W/TWL 17 5/8X23

## (undated) DEVICE — SEE MEDLINE ITEM 157216

## (undated) DEVICE — DRAPE ORTH SPLIT 77X108IN

## (undated) DEVICE — SUT VICRYL PLUS 3-0 SH 18IN

## (undated) DEVICE — SUT FIBERWIRE LOOP STRAIGHT

## (undated) DEVICE — SUT ETHILON 3-0 PS2 18 BLK

## (undated) DEVICE — KIT SHOULDER POSITIONER SPIDER

## (undated) DEVICE — SKIN MARKER DEVON 160